# Patient Record
Sex: FEMALE | Race: WHITE | NOT HISPANIC OR LATINO | Employment: UNEMPLOYED | URBAN - METROPOLITAN AREA
[De-identification: names, ages, dates, MRNs, and addresses within clinical notes are randomized per-mention and may not be internally consistent; named-entity substitution may affect disease eponyms.]

---

## 2017-05-17 RX ORDER — LEVOTHYROXINE SODIUM 50 UG/1
TABLET ORAL
Qty: 30 TABLET | Refills: 3 | Status: SHIPPED | OUTPATIENT
Start: 2017-05-17 | End: 2017-07-14 | Stop reason: SDUPTHER

## 2017-06-14 ENCOUNTER — PATIENT MESSAGE (OUTPATIENT)
Dept: INTERNAL MEDICINE | Facility: CLINIC | Age: 57
End: 2017-06-14

## 2017-06-14 DIAGNOSIS — Z00.00 ROUTINE MEDICAL EXAM: Primary | ICD-10-CM

## 2017-06-14 NOTE — TELEPHONE ENCOUNTER
Pt would like her annual lab orders sent to [facility]. Please make sure I pended the orders correctly.

## 2017-06-24 LAB
1,25(OH)2D SERPL-MCNC: 51 PG/ML (ref 18–72)
1,25(OH)2D2 SERPL-MCNC: <8 PG/ML
1,25(OH)2D3 SERPL-MCNC: 51 PG/ML
ALBUMIN SERPL-MCNC: 4.2 G/DL (ref 3.6–5.1)
ALBUMIN/GLOB SERPL: 1.8 (CALC) (ref 1–2.5)
ALP SERPL-CCNC: 69 U/L (ref 33–130)
ALT SERPL-CCNC: 12 U/L (ref 6–29)
AST SERPL-CCNC: 17 U/L (ref 10–35)
BASOPHILS # BLD AUTO: 42 CELLS/UL (ref 0–200)
BASOPHILS NFR BLD AUTO: 0.7 %
BILIRUB SERPL-MCNC: 0.5 MG/DL (ref 0.2–1.2)
BUN SERPL-MCNC: 14 MG/DL (ref 7–25)
BUN/CREAT SERPL: NORMAL (CALC) (ref 6–22)
CALCIUM SERPL-MCNC: 9.1 MG/DL (ref 8.6–10.4)
CHLORIDE SERPL-SCNC: 104 MMOL/L (ref 98–110)
CHOLEST SERPL-MCNC: 231 MG/DL (ref 125–200)
CHOLEST/HDLC SERPL: 2.8 (CALC)
CO2 SERPL-SCNC: 30 MMOL/L (ref 20–31)
CREAT SERPL-MCNC: 0.74 MG/DL (ref 0.5–1.05)
EOSINOPHIL # BLD AUTO: 150 CELLS/UL (ref 15–500)
EOSINOPHIL NFR BLD AUTO: 2.5 %
ERYTHROCYTE [DISTWIDTH] IN BLOOD BY AUTOMATED COUNT: 13.1 % (ref 11–15)
GFR SERPL CREATININE-BSD FRML MDRD: 91 ML/MIN/1.73M2
GLOBULIN SER CALC-MCNC: 2.3 G/DL (CALC) (ref 1.9–3.7)
GLUCOSE SERPL-MCNC: 76 MG/DL (ref 65–99)
HBA1C MFR BLD: 5 % OF TOTAL HGB
HCT VFR BLD AUTO: 38.8 % (ref 35–45)
HDLC SERPL-MCNC: 84 MG/DL
HGB BLD-MCNC: 13 G/DL (ref 11.7–15.5)
LDLC SERPL CALC-MCNC: 116 MG/DL (CALC)
LYMPHOCYTES # BLD AUTO: 2634 CELLS/UL (ref 850–3900)
LYMPHOCYTES NFR BLD AUTO: 43.9 %
MCH RBC QN AUTO: 32.2 PG (ref 27–33)
MCHC RBC AUTO-ENTMCNC: 33.5 G/DL (ref 32–36)
MCV RBC AUTO: 96.1 FL (ref 80–100)
MONOCYTES # BLD AUTO: 552 CELLS/UL (ref 200–950)
MONOCYTES NFR BLD AUTO: 9.2 %
NEUTROPHILS # BLD AUTO: 2622 CELLS/UL (ref 1500–7800)
NEUTROPHILS NFR BLD AUTO: 43.7 %
NONHDLC SERPL-MCNC: 147 MG/DL (CALC)
PLATELET # BLD AUTO: 207 THOUSAND/UL (ref 140–400)
PMV BLD REES-ECKER: 9.9 FL (ref 7.5–12.5)
POTASSIUM SERPL-SCNC: 3.9 MMOL/L (ref 3.5–5.3)
PROT SERPL-MCNC: 6.5 G/DL (ref 6.1–8.1)
RBC # BLD AUTO: 4.04 MILLION/UL (ref 3.8–5.1)
SODIUM SERPL-SCNC: 140 MMOL/L (ref 135–146)
TRIGL SERPL-MCNC: 153 MG/DL
TSH SERPL-ACNC: 2.93 MIU/L (ref 0.4–4.5)
VIT B12 SERPL-MCNC: 506 PG/ML (ref 200–1100)
WBC # BLD AUTO: 6 THOUSAND/UL (ref 3.8–10.8)

## 2017-06-26 ENCOUNTER — PATIENT MESSAGE (OUTPATIENT)
Dept: INTERNAL MEDICINE | Facility: CLINIC | Age: 57
End: 2017-06-26

## 2017-07-14 ENCOUNTER — OFFICE VISIT (OUTPATIENT)
Dept: INTERNAL MEDICINE | Facility: CLINIC | Age: 57
End: 2017-07-14
Payer: COMMERCIAL

## 2017-07-14 VITALS
BODY MASS INDEX: 19.6 KG/M2 | WEIGHT: 106.5 LBS | HEART RATE: 75 BPM | HEIGHT: 62 IN | SYSTOLIC BLOOD PRESSURE: 108 MMHG | DIASTOLIC BLOOD PRESSURE: 68 MMHG

## 2017-07-14 DIAGNOSIS — E03.9 HYPOTHYROIDISM, UNSPECIFIED TYPE: ICD-10-CM

## 2017-07-14 DIAGNOSIS — Z00.00 ROUTINE PHYSICAL EXAMINATION: Primary | ICD-10-CM

## 2017-07-14 PROCEDURE — 99999 PR PBB SHADOW E&M-EST. PATIENT-LVL III: CPT | Mod: PBBFAC,,, | Performed by: INTERNAL MEDICINE

## 2017-07-14 PROCEDURE — 99396 PREV VISIT EST AGE 40-64: CPT | Mod: S$GLB,,, | Performed by: INTERNAL MEDICINE

## 2017-07-14 RX ORDER — LEVOTHYROXINE SODIUM 75 UG/1
75 TABLET ORAL DAILY
Qty: 30 TABLET | Refills: 12 | Status: SHIPPED | OUTPATIENT
Start: 2017-07-14 | End: 2017-08-15 | Stop reason: SDUPTHER

## 2017-07-14 NOTE — PROGRESS NOTES
Subjective:       Patient ID: Kirti Romero is a 56 y.o. female.    Chief Complaint: Annual Exam    History of present illness: Patient comes in for annual exam.  He does complain of fatigue and lack.  She does work in her yard with her  who is newly retired.  Some of her issues seem to stem back to a year ago when her daughter got , moved to [place] and started a new family.  She says that was her last child at home she believes that has been a big change.  She did ask however if we could try raising the dose of her thyroid medicine as that previously helped quite a bit with her energy.  She denies any chest pain shortness of breath fevers or chills.  No cough or wheeze.  Her ASCVD 10 year risk score was 1.2%.  Other than her cholesterol being up from prior years her HDL remains high and the labs were stable.  No a.m. headaches or snoring       Review of Systems   Constitutional: Positive for fatigue (very mild). Negative for chills, fever and unexpected weight change.   HENT: Negative for congestion, facial swelling, sore throat, trouble swallowing and voice change.    Eyes: Negative for pain and visual disturbance.   Respiratory: Negative for apnea, cough, chest tightness and shortness of breath.    Cardiovascular: Negative for chest pain and leg swelling.   Gastrointestinal: Negative for abdominal pain, constipation, diarrhea, nausea and vomiting.   Genitourinary: Negative for frequency, hematuria and menstrual problem.   Musculoskeletal: Negative for arthralgias, joint swelling, neck pain and neck stiffness.   Skin: Negative for rash and wound.   Neurological: Negative for dizziness, tremors, weakness, numbness and headaches.   Hematological: Negative for adenopathy.   Psychiatric/Behavioral: Negative for dysphoric mood. The patient is not nervous/anxious.         Patient complains of lack of motivation or possibly stemming from change in family situation       Objective:      Physical Exam    Constitutional: She is oriented to person, place, and time. She appears well-developed and well-nourished.   Thin healthy female no acute distress   HENT:   Head: Normocephalic and atraumatic.   Right Ear: Tympanic membrane, external ear and ear canal normal.   Left Ear: Tympanic membrane, external ear and ear canal normal.   Nose: Nose normal.   Mouth/Throat: Oropharynx is clear and moist and mucous membranes are normal. No oropharyngeal exudate.   Eyes: Conjunctivae and EOM are normal. Pupils are equal, round, and reactive to light. Right eye exhibits no discharge. Left eye exhibits no discharge.   Neck: Normal range of motion. Neck supple. No JVD present. No thyromegaly present.   Cardiovascular: Normal rate, regular rhythm, normal heart sounds and intact distal pulses.    No murmur heard.  Pulmonary/Chest: Effort normal and breath sounds normal. No respiratory distress. She has no wheezes. She has no rales.   Abdominal: Soft. Bowel sounds are normal. She exhibits no mass. There is no tenderness.   Musculoskeletal: Normal range of motion. She exhibits no edema or tenderness.   Lymphadenopathy:     She has no cervical adenopathy.        Right: No supraclavicular adenopathy present.        Left: No supraclavicular adenopathy present.   Neurological: She is alert and oriented to person, place, and time. She has normal reflexes. No cranial nerve deficit.   Skin: No rash noted.   Psychiatric: She has a normal mood and affect. She does not exhibit a depressed mood.       Assessment:       1. Routine physical examination    2. Hypothyroidism, unspecified type        Plan:       Kirti Nagel was seen today for annual exam.    Diagnoses and all orders for this visit:    Routine physical examination    Hypothyroidism, unspecified type  -     TSH; Future  -     TSH    Other orders  -     levothyroxine (SYNTHROID) 75 MCG tablet; Take 1 tablet (75 mcg total) by mouth once daily.        review response to thyroid medicine.   Her antidepressant medicine although he doesn't admit to being depressed.  She was quite happy and cheerful in the exam, we'll continue to monitor

## 2017-08-11 ENCOUNTER — PATIENT MESSAGE (OUTPATIENT)
Dept: INTERNAL MEDICINE | Facility: CLINIC | Age: 57
End: 2017-08-11

## 2017-08-15 RX ORDER — LEVOTHYROXINE SODIUM 75 UG/1
75 TABLET ORAL DAILY
Qty: 90 TABLET | Refills: 3 | Status: SHIPPED | OUTPATIENT
Start: 2017-08-15 | End: 2018-08-13 | Stop reason: SDUPTHER

## 2017-08-17 ENCOUNTER — PATIENT MESSAGE (OUTPATIENT)
Dept: INTERNAL MEDICINE | Facility: CLINIC | Age: 57
End: 2017-08-17

## 2017-08-17 LAB — TSH SERPL-ACNC: 1.21 MIU/L (ref 0.4–4.5)

## 2017-08-22 ENCOUNTER — OFFICE VISIT (OUTPATIENT)
Dept: OBSTETRICS AND GYNECOLOGY | Facility: CLINIC | Age: 57
End: 2017-08-22
Payer: COMMERCIAL

## 2017-08-22 VITALS
WEIGHT: 105.63 LBS | HEIGHT: 62 IN | SYSTOLIC BLOOD PRESSURE: 108 MMHG | BODY MASS INDEX: 19.44 KG/M2 | DIASTOLIC BLOOD PRESSURE: 60 MMHG

## 2017-08-22 DIAGNOSIS — Z12.4 ROUTINE PAPANICOLAOU SMEAR: Primary | ICD-10-CM

## 2017-08-22 DIAGNOSIS — Z12.31 VISIT FOR SCREENING MAMMOGRAM: ICD-10-CM

## 2017-08-22 DIAGNOSIS — Z01.419 WELL WOMAN EXAM WITH ROUTINE GYNECOLOGICAL EXAM: ICD-10-CM

## 2017-08-22 PROCEDURE — 99396 PREV VISIT EST AGE 40-64: CPT | Mod: S$GLB,,, | Performed by: OBSTETRICS & GYNECOLOGY

## 2017-08-22 PROCEDURE — 88175 CYTOPATH C/V AUTO FLUID REDO: CPT

## 2017-08-22 PROCEDURE — 99999 PR PBB SHADOW E&M-EST. PATIENT-LVL II: CPT | Mod: PBBFAC,,, | Performed by: OBSTETRICS & GYNECOLOGY

## 2017-08-22 RX ORDER — ESTRADIOL 1 MG/1
1 TABLET ORAL DAILY
Qty: 90 TABLET | Refills: 4 | Status: SHIPPED | OUTPATIENT
Start: 2017-08-22 | End: 2018-08-23 | Stop reason: SDUPTHER

## 2017-08-22 NOTE — PROGRESS NOTES
"  HPI:  56 y.o.   OB History      Para Term  AB Living    2 2       2    SAB TAB Ectopic Multiple Live Births            2       No LMP recorded. Patient has had a hysterectomy.   Patient here for her annual gynecologic exam.  She has no complaints at this time.    ROS:  GENERAL: No fever, chills, fatigability or weight loss.  SKIN: No rashes, itching or changes in color or texture of skin.  HEAD: No headaches or recent head trauma.  EYES: Visual acuity fine. No photophobia, ocular pain or diplopia.  EARS: Denies ear pain, discharge or vertigo.  NOSE: No loss of smell, no epistaxis or postnasal drip.  MOUTH & THROAT: No hoarseness or change in voice. No excessive gum bleeding.  NODES: Denies swollen glands.  CHEST: Denies YOUNG, cyanosis, wheezing, cough and sputum production.  CARDIOVASCULAR: Denies chest pain, PND, orthopnea or reduced exercise tolerance.  ABDOMEN: Appetite fine. No weight loss. Denies diarrhea, abdominal pain, hematemesis or blood in stool.  URINARY: No flank pain, dysuria or hematuria.  PERIPHERAL VASCULAR: No claudication or cyanosis.  MUSCULOSKELETAL: No joint stiffness or swelling. Denies back pain.  NEUROLOGIC: No history of seizures, paralysis, alteration of gait or coordination.    PE:   /60   Ht 5' 2" (1.575 m)   Wt 47.9 kg (105 lb 9.6 oz)   BMI 19.31 kg/m²   APPEARANCE: Well nourished, well developed, in no acute distress.  NECK: Neck symmetric without masses or thyromegaly.  NODES: No inguinal lymph node enlargement.  ABDOMEN: Soft. No tenderness or masses. No hepatosplenomegaly. No hernias.  BREASTS: Symmetrical, no skin changes or visible lesions. No palpable masses, nipple discharge or adenopathy bilaterally.  PELVIC: Normal external female genitalia without lesions. Normal hair distribution. Adequate perineal body, normal urethral meatus. Vagina moist and well rugated without lesions or discharge. No significant cystocele or rectocele. Uterus and cervix " surgically absent. Bimanual exam revealed no masses, tenderness or abnormality.    Procedure:  Pap Smear    Assessment:  Normal Gynecologic Exam    Plan:  Mammogram and Colonoscopy as per current recommendations.   Return to clinic in one year or for any problems or complaints.  Ovaries out  estraced 2mg  decreast 1  Doesn't want bone density

## 2017-09-19 ENCOUNTER — HOSPITAL ENCOUNTER (OUTPATIENT)
Dept: RADIOLOGY | Facility: HOSPITAL | Age: 57
Discharge: HOME | End: 2017-09-19
Attending: OBSTETRICS & GYNECOLOGY
Payer: COMMERCIAL

## 2017-09-19 DIAGNOSIS — Z12.31 VISIT FOR SCREENING MAMMOGRAM: ICD-10-CM

## 2017-09-19 PROCEDURE — 77063 BREAST TOMOSYNTHESIS BI: CPT | Mod: 26,,, | Performed by: RADIOLOGY

## 2017-09-19 PROCEDURE — 77067 SCR MAMMO BI INCL CAD: CPT | Mod: 26,,, | Performed by: RADIOLOGY

## 2017-09-19 PROCEDURE — 77067 SCR MAMMO BI INCL CAD: CPT | Mod: TC

## 2018-06-22 ENCOUNTER — TELEPHONE (OUTPATIENT)
Dept: INTERNAL MEDICINE | Facility: CLINIC | Age: 58
End: 2018-06-22

## 2018-07-29 ENCOUNTER — PATIENT MESSAGE (OUTPATIENT)
Dept: INTERNAL MEDICINE | Facility: CLINIC | Age: 58
End: 2018-07-29

## 2018-07-29 DIAGNOSIS — Z00.00 ROUTINE MEDICAL EXAM: Primary | ICD-10-CM

## 2018-08-04 LAB
1,25(OH)2D SERPL-MCNC: 38 PG/ML (ref 18–72)
1,25(OH)2D2 SERPL-MCNC: <8 PG/ML
1,25(OH)2D3 SERPL-MCNC: 38 PG/ML
ALBUMIN SERPL-MCNC: 4.4 G/DL (ref 3.6–5.1)
ALBUMIN/GLOB SERPL: 2.1 (CALC) (ref 1–2.5)
ALP SERPL-CCNC: 70 U/L (ref 33–130)
ALT SERPL-CCNC: 14 U/L (ref 6–29)
AST SERPL-CCNC: 18 U/L (ref 10–35)
BASOPHILS # BLD AUTO: 59 CELLS/UL (ref 0–200)
BASOPHILS NFR BLD AUTO: 1.1 %
BILIRUB SERPL-MCNC: 0.5 MG/DL (ref 0.2–1.2)
BUN SERPL-MCNC: 18 MG/DL (ref 7–25)
BUN/CREAT SERPL: NORMAL (CALC) (ref 6–22)
CALCIUM SERPL-MCNC: 9.4 MG/DL (ref 8.6–10.4)
CHLORIDE SERPL-SCNC: 104 MMOL/L (ref 98–110)
CHOLEST SERPL-MCNC: 216 MG/DL
CHOLEST/HDLC SERPL: 2.5 (CALC)
CO2 SERPL-SCNC: 29 MMOL/L (ref 20–31)
CREAT SERPL-MCNC: 0.8 MG/DL (ref 0.5–1.05)
EOSINOPHIL # BLD AUTO: 140 CELLS/UL (ref 15–500)
EOSINOPHIL NFR BLD AUTO: 2.6 %
ERYTHROCYTE [DISTWIDTH] IN BLOOD BY AUTOMATED COUNT: 12.4 % (ref 11–15)
GFR SERPL CREATININE-BSD FRML MDRD: 82 ML/MIN/1.73M2
GLOBULIN SER CALC-MCNC: 2.1 G/DL (CALC) (ref 1.9–3.7)
GLUCOSE SERPL-MCNC: 79 MG/DL (ref 65–99)
HCT VFR BLD AUTO: 39.4 % (ref 35–45)
HDLC SERPL-MCNC: 88 MG/DL
HGB BLD-MCNC: 13.3 G/DL (ref 11.7–15.5)
LDLC SERPL CALC-MCNC: 113 MG/DL (CALC)
LYMPHOCYTES # BLD AUTO: 2381 CELLS/UL (ref 850–3900)
LYMPHOCYTES NFR BLD AUTO: 44.1 %
MCH RBC QN AUTO: 32 PG (ref 27–33)
MCHC RBC AUTO-ENTMCNC: 33.8 G/DL (ref 32–36)
MCV RBC AUTO: 94.9 FL (ref 80–100)
MONOCYTES # BLD AUTO: 486 CELLS/UL (ref 200–950)
MONOCYTES NFR BLD AUTO: 9 %
NEUTROPHILS # BLD AUTO: 2333 CELLS/UL (ref 1500–7800)
NEUTROPHILS NFR BLD AUTO: 43.2 %
NONHDLC SERPL-MCNC: 128 MG/DL (CALC)
PLATELET # BLD AUTO: 222 THOUSAND/UL (ref 140–400)
PMV BLD REES-ECKER: 11.7 FL (ref 7.5–12.5)
POTASSIUM SERPL-SCNC: 4.1 MMOL/L (ref 3.5–5.3)
PROT SERPL-MCNC: 6.5 G/DL (ref 6.1–8.1)
RBC # BLD AUTO: 4.15 MILLION/UL (ref 3.8–5.1)
SODIUM SERPL-SCNC: 141 MMOL/L (ref 135–146)
TRIGL SERPL-MCNC: 68 MG/DL
TSH SERPL-ACNC: 2.53 MIU/L (ref 0.4–4.5)
VIT B12 SERPL-MCNC: 380 PG/ML (ref 200–1100)
WBC # BLD AUTO: 5.4 THOUSAND/UL (ref 3.8–10.8)

## 2018-08-13 ENCOUNTER — OFFICE VISIT (OUTPATIENT)
Dept: INTERNAL MEDICINE | Facility: CLINIC | Age: 58
End: 2018-08-13
Payer: COMMERCIAL

## 2018-08-13 VITALS
OXYGEN SATURATION: 99 % | WEIGHT: 105.19 LBS | BODY MASS INDEX: 19.36 KG/M2 | DIASTOLIC BLOOD PRESSURE: 64 MMHG | SYSTOLIC BLOOD PRESSURE: 108 MMHG | HEIGHT: 62 IN | HEART RATE: 75 BPM

## 2018-08-13 DIAGNOSIS — E53.8 B12 DEFICIENCY: ICD-10-CM

## 2018-08-13 DIAGNOSIS — E03.9 HYPOTHYROIDISM, UNSPECIFIED TYPE: ICD-10-CM

## 2018-08-13 DIAGNOSIS — R21 SKIN RASH: ICD-10-CM

## 2018-08-13 DIAGNOSIS — E55.9 VITAMIN D DEFICIENCY: ICD-10-CM

## 2018-08-13 DIAGNOSIS — Z00.00 ROUTINE PHYSICAL EXAMINATION: Primary | ICD-10-CM

## 2018-08-13 PROCEDURE — 99999 PR PBB SHADOW E&M-EST. PATIENT-LVL III: CPT | Mod: PBBFAC,,, | Performed by: INTERNAL MEDICINE

## 2018-08-13 PROCEDURE — 99396 PREV VISIT EST AGE 40-64: CPT | Mod: S$GLB,,, | Performed by: INTERNAL MEDICINE

## 2018-08-13 RX ORDER — LEVOTHYROXINE SODIUM 75 UG/1
75 TABLET ORAL DAILY
Qty: 90 TABLET | Refills: 3 | Status: SHIPPED | OUTPATIENT
Start: 2018-08-13 | End: 2019-08-15 | Stop reason: SDUPTHER

## 2018-08-13 RX ORDER — TRETINOIN 0.25 MG/G
CREAM TOPICAL NIGHTLY
Qty: 45 G | Refills: 6 | Status: SHIPPED | OUTPATIENT
Start: 2018-08-13 | End: 2020-08-18

## 2018-08-13 NOTE — PROGRESS NOTES
Subjective:       Patient ID: Kirti Romero is a 57 y.o. female.    Chief Complaint: Annual Exam    HPI:  Patient comes in for annual exam.  She is doing well.  She asked me to write a prescription given to her by her dermatologist for some facial blemishes.  She also needs her thyroid refilled.  She is doing very well, has no active or acute complaints.  Prescriptions were reviewed and all are stable.      Review of Systems   Constitutional: Negative for activity change, chills, fatigue, fever and unexpected weight change.   HENT: Negative for hearing loss, nosebleeds, rhinorrhea, sinus pain, sore throat and trouble swallowing.    Eyes: Negative for pain, discharge and visual disturbance.   Respiratory: Negative for apnea, cough, chest tightness, shortness of breath and wheezing.    Cardiovascular: Negative for chest pain, palpitations and leg swelling.   Gastrointestinal: Negative for abdominal pain, blood in stool, constipation, diarrhea, nausea and vomiting.   Endocrine: Negative for polydipsia and polyuria.   Genitourinary: Negative for difficulty urinating, dysuria, frequency, hematuria and menstrual problem.   Musculoskeletal: Negative for arthralgias, joint swelling, neck pain and neck stiffness.   Skin: Negative for rash and wound.   Neurological: Negative for dizziness, weakness and headaches.   Hematological: Negative for adenopathy.   Psychiatric/Behavioral: Negative for confusion and dysphoric mood. The patient is not nervous/anxious.        Objective:      Physical Exam   Constitutional: She is oriented to person, place, and time. She appears well-developed and well-nourished.   HENT:   Head: Normocephalic and atraumatic.   Right Ear: Tympanic membrane, external ear and ear canal normal.   Left Ear: Tympanic membrane, external ear and ear canal normal.   Nose: Nose normal.   Mouth/Throat: Oropharynx is clear and moist and mucous membranes are normal. No oropharyngeal exudate.   Eyes: Conjunctivae  and EOM are normal. Pupils are equal, round, and reactive to light. Right eye exhibits no discharge. Left eye exhibits no discharge.   Neck: Normal range of motion. Neck supple. No JVD present. No thyromegaly present.   Cardiovascular: Normal rate, regular rhythm, normal heart sounds and intact distal pulses.   No murmur heard.  Pulmonary/Chest: Effort normal and breath sounds normal. No respiratory distress. She has no wheezes. She has no rales.   Abdominal: Soft. Bowel sounds are normal. She exhibits no mass. There is no tenderness.   Musculoskeletal: Normal range of motion. She exhibits no edema or tenderness.   Lymphadenopathy:     She has no cervical adenopathy.        Right: No supraclavicular adenopathy present.        Left: No supraclavicular adenopathy present.   Neurological: She is alert and oriented to person, place, and time. She has normal reflexes. No cranial nerve deficit.   Skin: No rash noted.   Psychiatric: She has a normal mood and affect. She does not exhibit a depressed mood.       Assessment:       1. Routine physical examination    2. Hypothyroidism, unspecified type    3. Skin rash    4. Vitamin D deficiency    5. B12 deficiency        Plan:       Kirti Nagel was seen today for annual exam.    Diagnoses and all orders for this visit:    Routine physical examination  -     Vitamin D; Future  -     Vitamin B12; Future  -     TSH; Future  -     CBC auto differential; Future  -     Comprehensive metabolic panel; Future  -     Hemoglobin A1c; Future  -     Lipid panel; Future  -     Vitamin D  -     Vitamin B12  -     TSH  -     CBC auto differential  -     Comprehensive metabolic panel  -     Hemoglobin A1c  -     Lipid panel    Hypothyroidism, unspecified type  -     Vitamin D; Future  -     Vitamin B12; Future  -     TSH; Future  -     CBC auto differential; Future  -     Comprehensive metabolic panel; Future  -     Hemoglobin A1c; Future  -     Lipid panel; Future  -     Vitamin D  -      Vitamin B12  -     TSH  -     CBC auto differential  -     Comprehensive metabolic panel  -     Hemoglobin A1c  -     Lipid panel    Skin rash    Vitamin D deficiency  -     Vitamin D; Future  -     Vitamin B12; Future  -     TSH; Future  -     CBC auto differential; Future  -     Comprehensive metabolic panel; Future  -     Hemoglobin A1c; Future  -     Lipid panel; Future  -     Vitamin D  -     Vitamin B12  -     TSH  -     CBC auto differential  -     Comprehensive metabolic panel  -     Hemoglobin A1c  -     Lipid panel    B12 deficiency  -     Vitamin D; Future  -     Vitamin B12; Future  -     TSH; Future  -     CBC auto differential; Future  -     Comprehensive metabolic panel; Future  -     Hemoglobin A1c; Future  -     Lipid panel; Future  -     Vitamin D  -     Vitamin B12  -     TSH  -     CBC auto differential  -     Comprehensive metabolic panel  -     Hemoglobin A1c  -     Lipid panel    Other orders  -     levothyroxine (SYNTHROID) 75 MCG tablet; Take 1 tablet (75 mcg total) by mouth once daily.  -     tretinoin (RETIN-A) 0.025 % cream; Apply topically every evening.

## 2018-08-23 ENCOUNTER — OFFICE VISIT (OUTPATIENT)
Dept: OBSTETRICS AND GYNECOLOGY | Facility: CLINIC | Age: 58
End: 2018-08-23
Payer: COMMERCIAL

## 2018-08-23 VITALS
DIASTOLIC BLOOD PRESSURE: 68 MMHG | BODY MASS INDEX: 19.6 KG/M2 | HEIGHT: 62 IN | WEIGHT: 106.5 LBS | SYSTOLIC BLOOD PRESSURE: 102 MMHG

## 2018-08-23 DIAGNOSIS — Z12.4 ROUTINE PAPANICOLAOU SMEAR: Primary | ICD-10-CM

## 2018-08-23 DIAGNOSIS — Z01.419 WELL WOMAN EXAM WITH ROUTINE GYNECOLOGICAL EXAM: ICD-10-CM

## 2018-08-23 PROCEDURE — 99999 PR PBB SHADOW E&M-EST. PATIENT-LVL III: CPT | Mod: PBBFAC,,, | Performed by: OBSTETRICS & GYNECOLOGY

## 2018-08-23 PROCEDURE — 88175 CYTOPATH C/V AUTO FLUID REDO: CPT

## 2018-08-23 PROCEDURE — 99396 PREV VISIT EST AGE 40-64: CPT | Mod: S$GLB,,, | Performed by: OBSTETRICS & GYNECOLOGY

## 2018-08-23 RX ORDER — ESTRADIOL 1 MG/1
1 TABLET ORAL DAILY
Qty: 90 TABLET | Refills: 4 | Status: SHIPPED | OUTPATIENT
Start: 2018-08-23 | End: 2019-08-23

## 2018-08-23 NOTE — PROGRESS NOTES
"  HPI:  57 y.o.   OB History      Para Term  AB Living    4 4 2     2    SAB TAB Ectopic Multiple Live Births            2       Patient's last menstrual period was 1998.   Patient here for her annual gynecologic exam.  She has no complaints at this time.    ROS:  GENERAL: No fever, chills, fatigability or weight loss.  SKIN: No rashes, itching or changes in color or texture of skin.  HEAD: No headaches or recent head trauma.  EYES: Visual acuity fine. No photophobia, ocular pain or diplopia.  EARS: Denies ear pain, discharge or vertigo.  NOSE: No loss of smell, no epistaxis or postnasal drip.  MOUTH & THROAT: No hoarseness or change in voice. No excessive gum bleeding.  NODES: Denies swollen glands.  CHEST: Denies YOUNG, cyanosis, wheezing, cough and sputum production.  CARDIOVASCULAR: Denies chest pain, PND, orthopnea or reduced exercise tolerance.  ABDOMEN: Appetite fine. No weight loss. Denies diarrhea, abdominal pain, hematemesis or blood in stool.  URINARY: No flank pain, dysuria or hematuria.  PERIPHERAL VASCULAR: No claudication or cyanosis.  MUSCULOSKELETAL: No joint stiffness or swelling. Denies back pain.  NEUROLOGIC: No history of seizures, paralysis, alteration of gait or coordination.    PE:   /68   Ht 5' 2" (1.575 m)   Wt 48.3 kg (106 lb 7.7 oz)   LMP 1998   BMI 19.48 kg/m²   APPEARANCE: Well nourished, well developed, in no acute distress.  NECK: Neck symmetric without masses or thyromegaly.  NODES: No inguinal lymph node enlargement.  ABDOMEN: Soft. No tenderness or masses. No hepatosplenomegaly. No hernias.  BREASTS: Symmetrical, no skin changes or visible lesions. No palpable masses, nipple discharge or adenopathy bilaterally.  PELVIC: Normal external female genitalia without lesions. Normal hair distribution. Adequate perineal body, normal urethral meatus. Vagina moist and well rugated without lesions or discharge. No significant cystocele or rectocele. Uterus " and cervix surgically absent. Bimanual exam revealed no masses, tenderness or abnormality.    Procedure:  Pap Smear    Assessment:  Normal Gynecologic Exam    Plan:  Mammogram and Colonoscopy as per current recommendations.   Return to clinic in one year or for any problems or complaints.  No co  Normal gyn exam

## 2018-09-20 ENCOUNTER — HOSPITAL ENCOUNTER (OUTPATIENT)
Dept: RADIOLOGY | Facility: HOSPITAL | Age: 58
Discharge: HOME | End: 2018-09-20
Attending: OBSTETRICS & GYNECOLOGY
Payer: COMMERCIAL

## 2018-09-20 DIAGNOSIS — Z12.4 ROUTINE PAPANICOLAOU SMEAR: ICD-10-CM

## 2018-09-20 PROCEDURE — 77063 BREAST TOMOSYNTHESIS BI: CPT | Mod: TC

## 2018-09-20 PROCEDURE — 77063 BREAST TOMOSYNTHESIS BI: CPT | Mod: 26,,, | Performed by: RADIOLOGY

## 2018-09-20 PROCEDURE — 77067 SCR MAMMO BI INCL CAD: CPT | Mod: 26,,, | Performed by: RADIOLOGY

## 2018-10-06 ENCOUNTER — OFFICE VISIT (OUTPATIENT)
Dept: URGENT CARE | Facility: CLINIC | Age: 58
End: 2018-10-06
Payer: COMMERCIAL

## 2018-10-06 VITALS
HEIGHT: 62 IN | BODY MASS INDEX: 19.32 KG/M2 | DIASTOLIC BLOOD PRESSURE: 69 MMHG | HEART RATE: 70 BPM | RESPIRATION RATE: 16 BRPM | OXYGEN SATURATION: 99 % | SYSTOLIC BLOOD PRESSURE: 116 MMHG | WEIGHT: 105 LBS | TEMPERATURE: 98 F

## 2018-10-06 DIAGNOSIS — J06.9 UPPER RESPIRATORY TRACT INFECTION, UNSPECIFIED TYPE: Primary | ICD-10-CM

## 2018-10-06 DIAGNOSIS — R09.82 POST-NASAL DRIP: ICD-10-CM

## 2018-10-06 PROCEDURE — 3008F BODY MASS INDEX DOCD: CPT | Mod: CPTII,S$GLB,, | Performed by: NURSE PRACTITIONER

## 2018-10-06 PROCEDURE — 96372 THER/PROPH/DIAG INJ SC/IM: CPT | Mod: S$GLB,,, | Performed by: NURSE PRACTITIONER

## 2018-10-06 PROCEDURE — 99214 OFFICE O/P EST MOD 30 MIN: CPT | Mod: 25,S$GLB,, | Performed by: NURSE PRACTITIONER

## 2018-10-06 RX ORDER — BENZONATATE 100 MG/1
100 CAPSULE ORAL EVERY 6 HOURS PRN
Qty: 30 CAPSULE | Refills: 1 | Status: SHIPPED | OUTPATIENT
Start: 2018-10-06 | End: 2019-10-06

## 2018-10-06 RX ORDER — BETAMETHASONE SODIUM PHOSPHATE AND BETAMETHASONE ACETATE 3; 3 MG/ML; MG/ML
6 INJECTION, SUSPENSION INTRA-ARTICULAR; INTRALESIONAL; INTRAMUSCULAR; SOFT TISSUE
Status: COMPLETED | OUTPATIENT
Start: 2018-10-06 | End: 2018-10-06

## 2018-10-06 RX ORDER — PROMETHAZINE HYDROCHLORIDE AND DEXTROMETHORPHAN HYDROBROMIDE 6.25; 15 MG/5ML; MG/5ML
5 SYRUP ORAL NIGHTLY PRN
Qty: 118 ML | Refills: 0 | Status: SHIPPED | OUTPATIENT
Start: 2018-10-06 | End: 2018-10-11

## 2018-10-06 RX ORDER — LEVOCETIRIZINE DIHYDROCHLORIDE 5 MG/1
5 TABLET, FILM COATED ORAL NIGHTLY
Qty: 30 TABLET | Refills: 0 | Status: SHIPPED | OUTPATIENT
Start: 2018-10-06 | End: 2023-01-31

## 2018-10-06 RX ORDER — PREDNISONE 20 MG/1
20 TABLET ORAL DAILY
Qty: 4 TABLET | Refills: 0 | Status: SHIPPED | OUTPATIENT
Start: 2018-10-07 | End: 2018-10-11

## 2018-10-06 RX ADMIN — BETAMETHASONE SODIUM PHOSPHATE AND BETAMETHASONE ACETATE 6 MG: 3; 3 INJECTION, SUSPENSION INTRA-ARTICULAR; INTRALESIONAL; INTRAMUSCULAR; SOFT TISSUE at 01:10

## 2018-10-06 NOTE — PROGRESS NOTES
"Subjective:       Patient ID: Kirti Romero is a 57 y.o. female.    Vitals:  height is 5' 2" (1.575 m) and weight is 47.6 kg (105 lb). Her temperature is 97.9 °F (36.6 °C). Her blood pressure is 116/69 and her pulse is 70. Her respiration is 16 and oxygen saturation is 99%.     Chief Complaint: Nasal Congestion    Cough   This is a new problem. The current episode started in the past 7 days. The problem has been gradually worsening. The problem occurs constantly. The cough is productive of sputum. Associated symptoms include ear pain and headaches. Pertinent negatives include no chest pain, chills, eye redness, fever, myalgias, sore throat, shortness of breath or wheezing. The symptoms are aggravated by lying down. She has tried OTC cough suppressant for the symptoms. The treatment provided no relief.     Review of Systems   Constitution: Negative for chills, fever and malaise/fatigue.   HENT: Positive for congestion and ear pain. Negative for hoarse voice and sore throat.    Eyes: Negative for discharge and redness.   Cardiovascular: Negative for chest pain, dyspnea on exertion and leg swelling.   Respiratory: Positive for cough. Negative for shortness of breath, sputum production and wheezing.    Musculoskeletal: Negative for myalgias.   Gastrointestinal: Negative for abdominal pain and nausea.   Neurological: Positive for headaches.   All other systems reviewed and are negative.      Objective:      Physical Exam   Constitutional: She is oriented to person, place, and time. She appears well-developed and well-nourished. She is cooperative.  Non-toxic appearance. She does not appear ill. No distress.   HENT:   Head: Normocephalic and atraumatic.   Right Ear: Hearing, tympanic membrane, external ear and ear canal normal.   Left Ear: Hearing, tympanic membrane, external ear and ear canal normal.   Nose: Nose normal. No mucosal edema, rhinorrhea or nasal deformity. No epistaxis. Right sinus exhibits no " maxillary sinus tenderness and no frontal sinus tenderness. Left sinus exhibits no maxillary sinus tenderness and no frontal sinus tenderness.   Mouth/Throat: Uvula is midline and mucous membranes are normal. No trismus in the jaw. Normal dentition. No uvula swelling. Posterior oropharyngeal erythema present.   PND, cough   Eyes: EOM and lids are normal. Pupils are equal, round, and reactive to light. Right conjunctiva is injected. Left conjunctiva is injected. No scleral icterus.       Sclera clear bilat   Neck: Trachea normal, full passive range of motion without pain and phonation normal. Neck supple.   Cardiovascular: Normal rate, regular rhythm, normal heart sounds, intact distal pulses and normal pulses.   Pulmonary/Chest: Effort normal and breath sounds normal. No stridor. No respiratory distress. She has no decreased breath sounds. She has no wheezes. She has no rhonchi. She has no rales.   Abdominal: Soft. Normal appearance and bowel sounds are normal. She exhibits no distension. There is no tenderness.   Musculoskeletal: Normal range of motion. She exhibits no edema or deformity.   Neurological: She is alert and oriented to person, place, and time. She exhibits normal muscle tone. Coordination normal.   Skin: Skin is warm, dry and intact. She is not diaphoretic. No pallor.   Psychiatric: She has a normal mood and affect. Her speech is normal and behavior is normal. Judgment and thought content normal. Cognition and memory are normal.   Nursing note and vitals reviewed.      Assessment:       1. Upper respiratory tract infection, unspecified type    2. Post-nasal drip        Plan:         Upper respiratory tract infection, unspecified type    Post-nasal drip    Other orders  -     betamethasone acetate-betamethasone sodium phosphate injection 6 mg; Inject 1 mL (6 mg total) into the muscle one time.  -     predniSONE (DELTASONE) 20 MG tablet; Take 1 tablet (20 mg total) by mouth once daily. for 4 days   Dispense: 4 tablet; Refill: 0  -     levocetirizine (XYZAL) 5 MG tablet; Take 1 tablet (5 mg total) by mouth every evening.  Dispense: 30 tablet; Refill: 0  -     benzonatate (TESSALON PERLES) 100 MG capsule; Take 1 capsule (100 mg total) by mouth every 6 (six) hours as needed for Cough.  Dispense: 30 capsule; Refill: 1  -     promethazine-dextromethorphan (PROMETHAZINE-DM) 6.25-15 mg/5 mL Syrp; Take 5 mLs by mouth nightly as needed.  Dispense: 118 mL; Refill: 0        Viral Upper Respiratory Illness (Adult)  You have a viral upper respiratory illness (URI), which is another term for the common cold. This illness is contagious during the first few days. It is spread through the air by coughing and sneezing. It may also be spread by direct contact (touching the sick person and then touching your own eyes, nose, or mouth). Frequent handwashing will decrease risk of spread. Most viral illnesses go away within 7 to 10 days with rest and simple home remedies. Sometimes the illness may last for several weeks. Antibiotics will not kill a virus, and they are generally not prescribed for this condition.    Home care  · If symptoms are severe, rest at home for the first 2 to 3 days. When you resume activity, don't let yourself get too tired.  · Avoid being exposed to cigarette smoke (yours or others).  · You may use acetaminophen or ibuprofen to control pain and fever, unless another medicine was prescribed. (Note: If you have chronic liver or kidney disease, have ever had a stomach ulcer or gastrointestinal bleeding, or are taking blood-thinning medicines, talk with your healthcare provider before using these medicines.) Aspirin should never be given to anyone under 18 years of age who is ill with a viral infection or fever. It may cause severe liver or brain damage.  · Your appetite may be poor, so a light diet is fine. Avoid dehydration by drinking 6 to 8 glasses of fluids per day (water, soft drinks, juices, tea, or  soup). Extra fluids will help loosen secretions in the nose and lungs.  · Over-the-counter cold medicines will not shorten the length of time youre sick, but they may be helpful for the following symptoms: cough, sore throat, and nasal and sinus congestion. (Note: Do not use decongestants if you have high blood pressure.)  Follow-up care  Follow up with your healthcare provider, or as advised.  When to seek medical advice  Call your healthcare provider right away if any of these occur:  · Cough with lots of colored sputum (mucus)  · Severe headache; face, neck, or ear pain  · Difficulty swallowing due to throat pain  · Fever of 100.4°F (38°C)  Call 911, or get immediate medical care  Call emergency services right away if any of these occur:  · Chest pain, shortness of breath, wheezing, or difficulty breathing  · Coughing up blood  · Inability to swallow due to throat pain  Date Last Reviewed: 9/13/2015  © 3409-1874 [employer]. [address] [place] [address], [place] [id number]. All rights reserved. This information is not intended as a substitute for professional medical care. Always follow your healthcare professional's instructions.      Please return here or go to the Emergency Department for any concerns or worsening of condition.  Please follow up with your primary care doctor or specialist as needed.    If you  smoke, please stop smoking.

## 2018-10-06 NOTE — PATIENT INSTRUCTIONS
Viral Upper Respiratory Illness (Adult)  You have a viral upper respiratory illness (URI), which is another term for the common cold. This illness is contagious during the first few days. It is spread through the air by coughing and sneezing. It may also be spread by direct contact (touching the sick person and then touching your own eyes, nose, or mouth). Frequent handwashing will decrease risk of spread. Most viral illnesses go away within 7 to 10 days with rest and simple home remedies. Sometimes the illness may last for several weeks. Antibiotics will not kill a virus, and they are generally not prescribed for this condition.    Home care  · If symptoms are severe, rest at home for the first 2 to 3 days. When you resume activity, don't let yourself get too tired.  · Avoid being exposed to cigarette smoke (yours or others).  · You may use acetaminophen or ibuprofen to control pain and fever, unless another medicine was prescribed. (Note: If you have chronic liver or kidney disease, have ever had a stomach ulcer or gastrointestinal bleeding, or are taking blood-thinning medicines, talk with your healthcare provider before using these medicines.) Aspirin should never be given to anyone under 18 years of age who is ill with a viral infection or fever. It may cause severe liver or brain damage.  · Your appetite may be poor, so a light diet is fine. Avoid dehydration by drinking 6 to 8 glasses of fluids per day (water, soft drinks, juices, tea, or soup). Extra fluids will help loosen secretions in the nose and lungs.  · Over-the-counter cold medicines will not shorten the length of time youre sick, but they may be helpful for the following symptoms: cough, sore throat, and nasal and sinus congestion. (Note: Do not use decongestants if you have high blood pressure.)  Follow-up care  Follow up with your healthcare provider, or as advised.  When to seek medical advice  Call your healthcare provider right away if any  of these occur:  · Cough with lots of colored sputum (mucus)  · Severe headache; face, neck, or ear pain  · Difficulty swallowing due to throat pain  · Fever of 100.4°F (38°C)  Call 911, or get immediate medical care  Call emergency services right away if any of these occur:  · Chest pain, shortness of breath, wheezing, or difficulty breathing  · Coughing up blood  · Inability to swallow due to throat pain  Date Last Reviewed: 9/13/2015 © 2000-2017 [employer]. [address] [place] [address]. All rights reserved. This information is not intended as a substitute for professional medical care. Always follow your healthcare professional's instructions.      Please return here or go to the Emergency Department for any concerns or worsening of condition.  Please follow up with your primary care doctor or specialist as needed.    If you  smoke, please stop smoking.

## 2018-12-04 ENCOUNTER — PATIENT MESSAGE (OUTPATIENT)
Dept: INTERNAL MEDICINE | Facility: CLINIC | Age: 58
End: 2018-12-04

## 2018-12-04 ENCOUNTER — TELEPHONE (OUTPATIENT)
Dept: INTERNAL MEDICINE | Facility: CLINIC | Age: 58
End: 2018-12-04

## 2019-08-01 ENCOUNTER — PATIENT MESSAGE (OUTPATIENT)
Dept: INTERNAL MEDICINE | Facility: CLINIC | Age: 59
End: 2019-08-01

## 2019-08-01 DIAGNOSIS — Z00.00 ROUTINE PHYSICAL EXAMINATION: ICD-10-CM

## 2019-08-01 DIAGNOSIS — E03.9 HYPOTHYROIDISM, UNSPECIFIED TYPE: Primary | ICD-10-CM

## 2019-08-04 LAB
25(OH)D3 SERPL-MCNC: 45 NG/ML (ref 30–100)
ALBUMIN SERPL-MCNC: 4.4 G/DL (ref 3.6–5.1)
ALBUMIN/GLOB SERPL: 1.9 (CALC) (ref 1–2.5)
ALP SERPL-CCNC: 78 U/L (ref 33–130)
ALT SERPL-CCNC: 13 U/L (ref 6–29)
AST SERPL-CCNC: 18 U/L (ref 10–35)
BASOPHILS # BLD AUTO: 49 CELLS/UL (ref 0–200)
BASOPHILS NFR BLD AUTO: 1 %
BILIRUB SERPL-MCNC: 0.6 MG/DL (ref 0.2–1.2)
BUN SERPL-MCNC: 15 MG/DL (ref 7–25)
BUN/CREAT SERPL: NORMAL (CALC) (ref 6–22)
CALCIUM SERPL-MCNC: 9.5 MG/DL (ref 8.6–10.4)
CHLORIDE SERPL-SCNC: 103 MMOL/L (ref 98–110)
CHOLEST SERPL-MCNC: 207 MG/DL
CHOLEST/HDLC SERPL: 2.6 (CALC)
CO2 SERPL-SCNC: 32 MMOL/L (ref 20–32)
CREAT SERPL-MCNC: 0.67 MG/DL (ref 0.5–1.05)
EOSINOPHIL # BLD AUTO: 118 CELLS/UL (ref 15–500)
EOSINOPHIL NFR BLD AUTO: 2.4 %
ERYTHROCYTE [DISTWIDTH] IN BLOOD BY AUTOMATED COUNT: 12.8 % (ref 11–15)
GFRSERPLBLD MDRD-ARVRAT: 97 ML/MIN/1.73M2
GLOBULIN SER CALC-MCNC: 2.3 G/DL (CALC) (ref 1.9–3.7)
GLUCOSE SERPL-MCNC: 77 MG/DL (ref 65–99)
HCT VFR BLD AUTO: 39.8 % (ref 35–45)
HDLC SERPL-MCNC: 81 MG/DL
HGB BLD-MCNC: 13 G/DL (ref 11.7–15.5)
LDLC SERPL CALC-MCNC: 109 MG/DL (CALC)
LYMPHOCYTES # BLD AUTO: 2347 CELLS/UL (ref 850–3900)
LYMPHOCYTES NFR BLD AUTO: 47.9 %
MCH RBC QN AUTO: 31.3 PG (ref 27–33)
MCHC RBC AUTO-ENTMCNC: 32.7 G/DL (ref 32–36)
MCV RBC AUTO: 95.9 FL (ref 80–100)
MONOCYTES # BLD AUTO: 431 CELLS/UL (ref 200–950)
MONOCYTES NFR BLD AUTO: 8.8 %
NEUTROPHILS # BLD AUTO: 1955 CELLS/UL (ref 1500–7800)
NEUTROPHILS NFR BLD AUTO: 39.9 %
NONHDLC SERPL-MCNC: 126 MG/DL (CALC)
PLATELET # BLD AUTO: 188 THOUSAND/UL (ref 140–400)
PMV BLD REES-ECKER: 12.3 FL (ref 7.5–12.5)
POTASSIUM SERPL-SCNC: 4.2 MMOL/L (ref 3.5–5.3)
PROT SERPL-MCNC: 6.7 G/DL (ref 6.1–8.1)
RBC # BLD AUTO: 4.15 MILLION/UL (ref 3.8–5.1)
SODIUM SERPL-SCNC: 142 MMOL/L (ref 135–146)
TRIGL SERPL-MCNC: 77 MG/DL
TSH SERPL-ACNC: 2.85 MIU/L (ref 0.4–4.5)
VIT B12 SERPL-MCNC: 396 PG/ML (ref 200–1100)
WBC # BLD AUTO: 4.9 THOUSAND/UL (ref 3.8–10.8)

## 2019-08-15 ENCOUNTER — OFFICE VISIT (OUTPATIENT)
Dept: INTERNAL MEDICINE | Facility: CLINIC | Age: 59
End: 2019-08-15
Payer: COMMERCIAL

## 2019-08-15 VITALS
HEART RATE: 71 BPM | SYSTOLIC BLOOD PRESSURE: 102 MMHG | BODY MASS INDEX: 19.23 KG/M2 | WEIGHT: 105.19 LBS | DIASTOLIC BLOOD PRESSURE: 59 MMHG | OXYGEN SATURATION: 99 %

## 2019-08-15 DIAGNOSIS — E03.9 HYPOTHYROIDISM, UNSPECIFIED TYPE: ICD-10-CM

## 2019-08-15 DIAGNOSIS — Z00.00 ROUTINE PHYSICAL EXAMINATION: Primary | ICD-10-CM

## 2019-08-15 PROCEDURE — 99396 PREV VISIT EST AGE 40-64: CPT | Mod: S$GLB,,, | Performed by: INTERNAL MEDICINE

## 2019-08-15 PROCEDURE — 99999 PR PBB SHADOW E&M-EST. PATIENT-LVL III: ICD-10-PCS | Mod: PBBFAC,,, | Performed by: INTERNAL MEDICINE

## 2019-08-15 PROCEDURE — 99396 PR PREVENTIVE VISIT,EST,40-64: ICD-10-PCS | Mod: S$GLB,,, | Performed by: INTERNAL MEDICINE

## 2019-08-15 PROCEDURE — 99999 PR PBB SHADOW E&M-EST. PATIENT-LVL III: CPT | Mod: PBBFAC,,, | Performed by: INTERNAL MEDICINE

## 2019-08-15 RX ORDER — LEVOTHYROXINE SODIUM 75 UG/1
75 TABLET ORAL DAILY
Qty: 90 TABLET | Refills: 3 | Status: SHIPPED | OUTPATIENT
Start: 2019-08-15 | End: 2020-08-18 | Stop reason: SDUPTHER

## 2019-08-15 NOTE — PROGRESS NOTES
Subjective:       Patient ID: Kirti Romero is a 58 y.o. female.    Chief Complaint: Annual Exam    HPI:  Patient is doing well, here for annual exam.  She said she has had a great year.  Good energy.  Good mood.  Visiting her grandkids.   is retired.  She is due for gyn check and mammogram in the coming month or 2.  Labs reviewed and stable.  Continue current meds.    Review of Systems   Constitutional: Negative for activity change, appetite change, chills and fever.   HENT: Negative for nosebleeds, sinus pain and sore throat.    Eyes: Negative for discharge and visual disturbance.   Respiratory: Negative for cough, shortness of breath and wheezing.    Cardiovascular: Negative for chest pain, palpitations and leg swelling.   Gastrointestinal: Negative for abdominal pain, constipation, diarrhea and vomiting.   Genitourinary: Negative for difficulty urinating and hematuria.   Musculoskeletal: Negative for neck pain and neck stiffness.   Skin: Negative for pallor and rash.   Neurological: Negative for headaches.   Psychiatric/Behavioral: Negative for dysphoric mood and suicidal ideas. The patient is not nervous/anxious.        Objective:      Physical Exam   Constitutional: She is oriented to person, place, and time. She appears well-developed and well-nourished. No distress.   HENT:   Head: Normocephalic and atraumatic.   Right Ear: External ear normal.   Left Ear: External ear normal.   Mouth/Throat: Oropharynx is clear and moist. No oropharyngeal exudate.   Eyes: Pupils are equal, round, and reactive to light. Conjunctivae are normal. No scleral icterus.   Neck: Normal range of motion. Neck supple. No thyromegaly present.   Cardiovascular: Normal rate and regular rhythm.   No murmur heard.  Pulmonary/Chest: Effort normal and breath sounds normal. She has no wheezes.   Abdominal: Soft. Bowel sounds are normal. She exhibits no distension. There is no tenderness.   Musculoskeletal: She exhibits no  tenderness.   Lymphadenopathy:     She has no cervical adenopathy.   Neurological: She is alert and oriented to person, place, and time.   Skin: No rash noted.   Psychiatric: She has a normal mood and affect.       Assessment:       1. Routine physical examination    2. Hypothyroidism, unspecified type        Plan:       Kirti Nagel was seen today for annual exam.    Diagnoses and all orders for this visit:    Routine physical examination    Hypothyroidism, unspecified type    Other orders  -     levothyroxine (SYNTHROID) 75 MCG tablet; Take 1 tablet (75 mcg total) by mouth once daily.        follow-up annually

## 2019-09-04 ENCOUNTER — PATIENT MESSAGE (OUTPATIENT)
Dept: OBSTETRICS AND GYNECOLOGY | Facility: CLINIC | Age: 59
End: 2019-09-04

## 2019-09-04 DIAGNOSIS — Z12.39 SCREENING BREAST EXAMINATION: Primary | ICD-10-CM

## 2019-09-24 ENCOUNTER — PATIENT OUTREACH (OUTPATIENT)
Dept: ADMINISTRATIVE | Facility: OTHER | Age: 59
End: 2019-09-24

## 2019-09-26 ENCOUNTER — HOSPITAL ENCOUNTER (OUTPATIENT)
Dept: RADIOLOGY | Facility: HOSPITAL | Age: 59
Discharge: HOME | End: 2019-09-26
Attending: OBSTETRICS & GYNECOLOGY
Payer: COMMERCIAL

## 2019-09-26 ENCOUNTER — OFFICE VISIT (OUTPATIENT)
Dept: OBSTETRICS AND GYNECOLOGY | Facility: CLINIC | Age: 59
End: 2019-09-26
Payer: COMMERCIAL

## 2019-09-26 VITALS
WEIGHT: 106.13 LBS | BODY MASS INDEX: 19.53 KG/M2 | SYSTOLIC BLOOD PRESSURE: 110 MMHG | HEIGHT: 62 IN | DIASTOLIC BLOOD PRESSURE: 68 MMHG

## 2019-09-26 DIAGNOSIS — Z01.419 ENCOUNTER FOR WELL WOMAN EXAM WITH ROUTINE GYNECOLOGICAL EXAM: Primary | ICD-10-CM

## 2019-09-26 DIAGNOSIS — Z12.39 SCREENING BREAST EXAMINATION: ICD-10-CM

## 2019-09-26 DIAGNOSIS — Z01.419 WELL WOMAN EXAM WITH ROUTINE GYNECOLOGICAL EXAM: ICD-10-CM

## 2019-09-26 PROCEDURE — 77067 SCR MAMMO BI INCL CAD: CPT | Mod: 26,,, | Performed by: RADIOLOGY

## 2019-09-26 PROCEDURE — 77063 MAMMO DIGITAL SCREENING BILAT WITH TOMOSYNTHESIS_CAD: ICD-10-PCS | Mod: 26,,, | Performed by: RADIOLOGY

## 2019-09-26 PROCEDURE — 99396 PR PREVENTIVE VISIT,EST,40-64: ICD-10-PCS | Mod: S$GLB,,, | Performed by: OBSTETRICS & GYNECOLOGY

## 2019-09-26 PROCEDURE — 99396 PREV VISIT EST AGE 40-64: CPT | Mod: S$GLB,,, | Performed by: OBSTETRICS & GYNECOLOGY

## 2019-09-26 PROCEDURE — 77067 SCR MAMMO BI INCL CAD: CPT | Mod: TC

## 2019-09-26 PROCEDURE — 77067 MAMMO DIGITAL SCREENING BILAT WITH TOMOSYNTHESIS_CAD: ICD-10-PCS | Mod: 26,,, | Performed by: RADIOLOGY

## 2019-09-26 PROCEDURE — 99999 PR PBB SHADOW E&M-EST. PATIENT-LVL III: CPT | Mod: PBBFAC,,, | Performed by: OBSTETRICS & GYNECOLOGY

## 2019-09-26 PROCEDURE — 77063 BREAST TOMOSYNTHESIS BI: CPT | Mod: 26,,, | Performed by: RADIOLOGY

## 2019-09-26 PROCEDURE — 88175 CYTOPATH C/V AUTO FLUID REDO: CPT

## 2019-09-26 PROCEDURE — 99999 PR PBB SHADOW E&M-EST. PATIENT-LVL III: ICD-10-PCS | Mod: PBBFAC,,, | Performed by: OBSTETRICS & GYNECOLOGY

## 2019-09-26 RX ORDER — ESTRADIOL 1 MG/1
1 TABLET ORAL DAILY
Qty: 90 TABLET | Refills: 3 | Status: SHIPPED | OUTPATIENT
Start: 2019-09-26 | End: 2020-10-12 | Stop reason: SDUPTHER

## 2019-09-26 RX ORDER — ESTRADIOL 1 MG/1
1 TABLET ORAL DAILY
COMMUNITY
End: 2019-09-26 | Stop reason: SDUPTHER

## 2019-09-26 NOTE — PROGRESS NOTES
"  HPI:  58 y.o.   OB History        4    Para   4    Term   2            AB        Living   2       SAB        TAB        Ectopic        Multiple        Live Births   2              Patient's last menstrual period was 1998.   Patient here for her annual gynecologic exam.  She has no complaints at this time.    ROS:  GENERAL: No fever, chills, fatigability or weight loss.  SKIN: No rashes, itching or changes in color or texture of skin.  HEAD: No headaches or recent head trauma.  EYES: Visual acuity fine. No photophobia, ocular pain or diplopia.  EARS: Denies ear pain, discharge or vertigo.  NOSE: No loss of smell, no epistaxis or postnasal drip.  MOUTH & THROAT: No hoarseness or change in voice. No excessive gum bleeding.  NODES: Denies swollen glands.  CHEST: Denies YOUNG, cyanosis, wheezing, cough and sputum production.  CARDIOVASCULAR: Denies chest pain, PND, orthopnea or reduced exercise tolerance.  ABDOMEN: Appetite fine. No weight loss. Denies diarrhea, abdominal pain, hematemesis or blood in stool.  URINARY: No flank pain, dysuria or hematuria.  PERIPHERAL VASCULAR: No claudication or cyanosis.  MUSCULOSKELETAL: No joint stiffness or swelling. Denies back pain.  NEUROLOGIC: No history of seizures, paralysis, alteration of gait or coordination.    PE:   /68   Ht 5' 2" (1.575 m)   Wt 48.1 kg (106 lb 2.4 oz)   LMP 1998   BMI 19.42 kg/m²   APPEARANCE: Well nourished, well developed, in no acute distress.  NECK: Neck symmetric without masses or thyromegaly.  NODES: No inguinal lymph node enlargement.  ABDOMEN: Soft. No tenderness or masses. No hepatosplenomegaly. No hernias.  BREASTS: Symmetrical, no skin changes or visible lesions. No palpable masses, nipple discharge or adenopathy bilaterally.  PELVIC: Normal external female genitalia without lesions. Normal hair distribution. Adequate perineal body, normal urethral meatus. Vagina moist and well rugated without lesions or " discharge. No significant cystocele or rectocele. Uterus and cervix surgically absent. Bimanual exam revealed no masses, tenderness or abnormality.    Procedure:  Pap Smear    Assessment:  Normal Gynecologic Exam    Plan:  Mammogram and Colonoscopy as per current recommendations.   Return to clinic in one year or for any problems or complaints.

## 2019-09-27 ENCOUNTER — IMMUNIZATION (OUTPATIENT)
Dept: PHARMACY | Facility: CLINIC | Age: 59
End: 2019-09-27
Payer: COMMERCIAL

## 2019-11-03 ENCOUNTER — PATIENT OUTREACH (OUTPATIENT)
Dept: ADMINISTRATIVE | Facility: OTHER | Age: 59
End: 2019-11-03

## 2019-11-04 ENCOUNTER — OFFICE VISIT (OUTPATIENT)
Dept: OPTOMETRY | Facility: CLINIC | Age: 59
End: 2019-11-04
Payer: COMMERCIAL

## 2019-11-04 DIAGNOSIS — H52.4 PRESBYOPIA OF BOTH EYES: ICD-10-CM

## 2019-11-04 DIAGNOSIS — Z13.5 SCREENING FOR EYE CONDITION: ICD-10-CM

## 2019-11-04 DIAGNOSIS — H52.203 MYOPIA OF BOTH EYES WITH ASTIGMATISM: ICD-10-CM

## 2019-11-04 DIAGNOSIS — H25.13 NUCLEAR SCLEROSIS OF BOTH EYES: Primary | ICD-10-CM

## 2019-11-04 DIAGNOSIS — H52.13 MYOPIA OF BOTH EYES WITH ASTIGMATISM: ICD-10-CM

## 2019-11-04 PROCEDURE — 99999 PR PBB SHADOW E&M-EST. PATIENT-LVL III: ICD-10-PCS | Mod: PBBFAC,,, | Performed by: OPTOMETRIST

## 2019-11-04 PROCEDURE — 92004 COMPRE OPH EXAM NEW PT 1/>: CPT | Mod: S$GLB,,, | Performed by: OPTOMETRIST

## 2019-11-04 PROCEDURE — 92015 PR REFRACTION: ICD-10-PCS | Mod: S$GLB,,, | Performed by: OPTOMETRIST

## 2019-11-04 PROCEDURE — 99999 PR PBB SHADOW E&M-EST. PATIENT-LVL III: CPT | Mod: PBBFAC,,, | Performed by: OPTOMETRIST

## 2019-11-04 PROCEDURE — 92004 PR EYE EXAM, NEW PATIENT,COMPREHESV: ICD-10-PCS | Mod: S$GLB,,, | Performed by: OPTOMETRIST

## 2019-11-04 PROCEDURE — 92015 DETERMINE REFRACTIVE STATE: CPT | Mod: S$GLB,,, | Performed by: OPTOMETRIST

## 2019-11-04 NOTE — PATIENT INSTRUCTIONS
Trace nuclear sclerosis of lens of both eyes, consistent with age.  Good ocular health in both eyes, otherwise.  Myopia with astigmatism in each eye.  Good best correctable VA in each eye, slightly better in the right eye than in the left eye.  Presbyopia consistent with age.  New spectacle lens Rx issued for use as desired.    Recheck in one year - or prior, if any problems in the interim.

## 2019-11-04 NOTE — PROGRESS NOTES
"HPI     eye examination       Additional comments: Pt states near vision changes               Comments     Patient's age: 55 y.o. WF  Approximate date of last eye examination:  16 mos   Name of last eye doctor seen: Dr. Frey  Wears glasses? yes     If yes, wears  Full-time or part-time?  Full-time  Present glasses are: Bifocal, SV Distance, SV Reading?  Progressive lenses  Approximate age of present glasses:  16 mos   Got new glasses following last exam, or subsequently?:  yes   Any problem with VA with glasses?  Near vision changes   Wears CLs?:  no  Headaches?  no  Eye pain/discomfort?  no                                                                                     Flashes?  no  Floaters?  no  Diplopia/Double vision?  no  Patient's Ocular History:         Any eye surgeries? No          Any eye injury?  No          Any treatment for eye disease?  No  Family history of eye disease?  No   Significant patient medical history:         1. Diabetes?  no       If yes, IDDM or NIDDM? n/a   2. HBP?  no              3. Other (describe):  No   ! OTC eyedrops currently using:  No    ! Prescription eye meds currently using:  no   ! Any history of allergy/adverse reaction to any eye meds used   previously?  no    ! Any history of allergy/adverse reaction to eyedrops used during prior   eye exam(s)? no    ! Any history of allergy/adverse reaction to Novacaine or similar meds?   no   ! Any history of allergy/adverse reaction to Epinephrine or similar meds?   no    ! Patient okay with use of anesthetic eyedrops to check eye pressure?    yes        ! Patient okay with use of eyedrops to dilate pupils today?  Yes   !  Allergies/Medications/Medical History/Family History reviewed today?    yes      PD =   61/57  Desired reading distance =  12" (use 13")                                                                       Last edited by Santo Garcia, OD on 11/4/2019  8:21 AM. (History)            Assessment /Plan "     For exam results, see Encounter Report.    1. Nuclear sclerosis of both eyes     2. Myopia of both eyes with astigmatism     3. Presbyopia of both eyes     4. Screening for eye condition                    Trace nuclear sclerosis of lens of both eyes, consistent with age.  Good ocular health in both eyes, otherwise.  Myopia with astigmatism in each eye.  Good best correctable VA in each eye, slightly better in the right eye than in the left eye.  Presbyopia consistent with age.  New spectacle lens Rx issued for use as desired.    Recheck in one year - or prior, if any problems in the interim.

## 2020-03-30 ENCOUNTER — PATIENT MESSAGE (OUTPATIENT)
Dept: INTERNAL MEDICINE | Facility: CLINIC | Age: 60
End: 2020-03-30

## 2020-07-28 ENCOUNTER — PATIENT MESSAGE (OUTPATIENT)
Dept: OBSTETRICS AND GYNECOLOGY | Facility: CLINIC | Age: 60
End: 2020-07-28

## 2020-07-28 DIAGNOSIS — Z12.31 ENCOUNTER FOR SCREENING MAMMOGRAM FOR BREAST CANCER: Primary | ICD-10-CM

## 2020-07-31 ENCOUNTER — PATIENT MESSAGE (OUTPATIENT)
Dept: INTERNAL MEDICINE | Facility: CLINIC | Age: 60
End: 2020-07-31

## 2020-07-31 DIAGNOSIS — Z00.00 ROUTINE PHYSICAL EXAMINATION: ICD-10-CM

## 2020-07-31 DIAGNOSIS — E03.9 HYPOTHYROIDISM, UNSPECIFIED TYPE: ICD-10-CM

## 2020-07-31 DIAGNOSIS — Z01.419 ROUTINE GYNECOLOGICAL EXAMINATION: Primary | ICD-10-CM

## 2020-07-31 DIAGNOSIS — E53.8 B12 DEFICIENCY: ICD-10-CM

## 2020-07-31 DIAGNOSIS — E55.9 VITAMIN D DEFICIENCY: ICD-10-CM

## 2020-08-04 ENCOUNTER — PATIENT OUTREACH (OUTPATIENT)
Dept: ADMINISTRATIVE | Facility: HOSPITAL | Age: 60
End: 2020-08-04

## 2020-08-07 LAB
25(OH)D3 SERPL-MCNC: 44 NG/ML (ref 30–100)
ALBUMIN SERPL-MCNC: 4.1 G/DL (ref 3.6–5.1)
ALBUMIN/GLOB SERPL: 2 (CALC) (ref 1–2.5)
ALP SERPL-CCNC: 59 U/L (ref 37–153)
ALT SERPL-CCNC: 10 U/L (ref 6–29)
AST SERPL-CCNC: 18 U/L (ref 10–35)
BASOPHILS # BLD AUTO: 52 CELLS/UL (ref 0–200)
BASOPHILS NFR BLD AUTO: 1 %
BILIRUB SERPL-MCNC: 0.5 MG/DL (ref 0.2–1.2)
BUN SERPL-MCNC: 9 MG/DL (ref 7–25)
BUN/CREAT SERPL: NORMAL (CALC) (ref 6–22)
CALCIUM SERPL-MCNC: 9.1 MG/DL (ref 8.6–10.4)
CHLORIDE SERPL-SCNC: 107 MMOL/L (ref 98–110)
CHOLEST SERPL-MCNC: 205 MG/DL
CHOLEST/HDLC SERPL: 2.9 (CALC)
CO2 SERPL-SCNC: 29 MMOL/L (ref 20–32)
CREAT SERPL-MCNC: 0.68 MG/DL (ref 0.5–1.05)
EOSINOPHIL # BLD AUTO: 130 CELLS/UL (ref 15–500)
EOSINOPHIL NFR BLD AUTO: 2.5 %
ERYTHROCYTE [DISTWIDTH] IN BLOOD BY AUTOMATED COUNT: 12.4 % (ref 11–15)
GFRSERPLBLD MDRD-ARVRAT: 96 ML/MIN/1.73M2
GLOBULIN SER CALC-MCNC: 2.1 G/DL (CALC) (ref 1.9–3.7)
GLUCOSE SERPL-MCNC: 80 MG/DL (ref 65–99)
HCT VFR BLD AUTO: 39.1 % (ref 35–45)
HDLC SERPL-MCNC: 71 MG/DL
HGB BLD-MCNC: 12.7 G/DL (ref 11.7–15.5)
LDLC SERPL CALC-MCNC: 112 MG/DL (CALC)
LYMPHOCYTES # BLD AUTO: 2106 CELLS/UL (ref 850–3900)
LYMPHOCYTES NFR BLD AUTO: 40.5 %
MCH RBC QN AUTO: 31.1 PG (ref 27–33)
MCHC RBC AUTO-ENTMCNC: 32.5 G/DL (ref 32–36)
MCV RBC AUTO: 95.8 FL (ref 80–100)
MONOCYTES # BLD AUTO: 395 CELLS/UL (ref 200–950)
MONOCYTES NFR BLD AUTO: 7.6 %
NEUTROPHILS # BLD AUTO: 2517 CELLS/UL (ref 1500–7800)
NEUTROPHILS NFR BLD AUTO: 48.4 %
NONHDLC SERPL-MCNC: 134 MG/DL (CALC)
PLATELET # BLD AUTO: 213 THOUSAND/UL (ref 140–400)
PMV BLD REES-ECKER: 11.5 FL (ref 7.5–12.5)
POTASSIUM SERPL-SCNC: 3.9 MMOL/L (ref 3.5–5.3)
PROT SERPL-MCNC: 6.2 G/DL (ref 6.1–8.1)
RBC # BLD AUTO: 4.08 MILLION/UL (ref 3.8–5.1)
SODIUM SERPL-SCNC: 142 MMOL/L (ref 135–146)
TRIGL SERPL-MCNC: 111 MG/DL
TSH SERPL-ACNC: 2.35 MIU/L (ref 0.4–4.5)
VIT B12 SERPL-MCNC: 272 PG/ML (ref 200–1100)
WBC # BLD AUTO: 5.2 THOUSAND/UL (ref 3.8–10.8)

## 2020-08-18 ENCOUNTER — OFFICE VISIT (OUTPATIENT)
Dept: INTERNAL MEDICINE | Facility: CLINIC | Age: 60
End: 2020-08-18
Payer: COMMERCIAL

## 2020-08-18 VITALS — BODY MASS INDEX: 19.6 KG/M2 | WEIGHT: 106.5 LBS | HEIGHT: 62 IN

## 2020-08-18 DIAGNOSIS — E55.9 VITAMIN D DEFICIENCY: ICD-10-CM

## 2020-08-18 DIAGNOSIS — L98.9 SKIN LESION: ICD-10-CM

## 2020-08-18 DIAGNOSIS — Z00.00 ROUTINE PHYSICAL EXAMINATION: Primary | ICD-10-CM

## 2020-08-18 DIAGNOSIS — Z12.11 COLON CANCER SCREENING: ICD-10-CM

## 2020-08-18 DIAGNOSIS — E03.9 HYPOTHYROIDISM, UNSPECIFIED TYPE: ICD-10-CM

## 2020-08-18 DIAGNOSIS — E53.8 B12 DEFICIENCY: ICD-10-CM

## 2020-08-18 PROCEDURE — 99999 PR PBB SHADOW E&M-EST. PATIENT-LVL III: ICD-10-PCS | Mod: PBBFAC,,, | Performed by: INTERNAL MEDICINE

## 2020-08-18 PROCEDURE — 99999 PR PBB SHADOW E&M-EST. PATIENT-LVL III: CPT | Mod: PBBFAC,,, | Performed by: INTERNAL MEDICINE

## 2020-08-18 PROCEDURE — 3008F BODY MASS INDEX DOCD: CPT | Mod: CPTII,S$GLB,, | Performed by: INTERNAL MEDICINE

## 2020-08-18 PROCEDURE — 99396 PR PREVENTIVE VISIT,EST,40-64: ICD-10-PCS | Mod: S$GLB,,, | Performed by: INTERNAL MEDICINE

## 2020-08-18 PROCEDURE — 99396 PREV VISIT EST AGE 40-64: CPT | Mod: S$GLB,,, | Performed by: INTERNAL MEDICINE

## 2020-08-18 PROCEDURE — 3008F PR BODY MASS INDEX (BMI) DOCUMENTED: ICD-10-PCS | Mod: CPTII,S$GLB,, | Performed by: INTERNAL MEDICINE

## 2020-08-18 RX ORDER — LEVOTHYROXINE SODIUM 75 UG/1
75 TABLET ORAL DAILY
Qty: 90 TABLET | Refills: 3 | Status: SHIPPED | OUTPATIENT
Start: 2020-08-18 | End: 2021-08-19 | Stop reason: SDUPTHER

## 2020-08-18 NOTE — PROGRESS NOTES
Subjective:       Patient ID: Kirti Romero is a 59 y.o. female.    Chief Complaint: Annual Exam    Very pleasant 59-year-old female comes in for annual exam.  She says she is doing very well.  She is up-to-date on health maintenance including vaccines, eye exams mammograms in gyn checkup.  We reviewed her labs in detail and her trends are all good except she has a minor decline in B12.  Three years ago it was 500 and now down to nearly 200.  She sometimes feels possible numbness in her feet and I explained it could relate to that.  We will have her supplementing see if that helps.  Her other labs are all stable.  She has no other complaints.  Staying active.  Eating healthy, cholesterol has dropped from 230-204.  Her HDL ratio is stable.    Review of Systems   Constitutional: Positive for activity change. Negative for appetite change, chills, fever and unexpected weight change.   HENT: Negative for hearing loss, nosebleeds, rhinorrhea, sore throat and trouble swallowing.    Eyes: Negative for discharge and visual disturbance.   Respiratory: Negative for cough, chest tightness, shortness of breath and wheezing.    Cardiovascular: Negative for chest pain, palpitations and leg swelling.   Gastrointestinal: Negative for abdominal pain, blood in stool, constipation, diarrhea and vomiting.   Endocrine: Negative for polydipsia and polyuria.   Genitourinary: Negative for difficulty urinating, dysuria, hematuria and menstrual problem.   Musculoskeletal: Negative for arthralgias, joint swelling, neck pain and neck stiffness.   Integumentary:  Negative for pallor and rash.   Neurological: Positive for numbness (Occasional minor numbness in the feet). Negative for weakness and headaches.   Psychiatric/Behavioral: Negative for confusion, dysphoric mood and suicidal ideas. The patient is not nervous/anxious.          Objective:      Physical Exam  Constitutional:       General: She is not in acute distress.     Appearance:  She is well-developed.   HENT:      Head: Normocephalic and atraumatic.      Right Ear: Tympanic membrane, ear canal and external ear normal. There is no impacted cerumen.      Left Ear: Tympanic membrane, ear canal and external ear normal.      Nose: No rhinorrhea.      Mouth/Throat:      Pharynx: No oropharyngeal exudate or posterior oropharyngeal erythema.   Eyes:      General: No scleral icterus.     Conjunctiva/sclera: Conjunctivae normal.      Pupils: Pupils are equal, round, and reactive to light.   Neck:      Musculoskeletal: Normal range of motion and neck supple.      Thyroid: No thyromegaly.   Cardiovascular:      Rate and Rhythm: Normal rate and regular rhythm.      Pulses: Normal pulses.      Heart sounds: Normal heart sounds. No murmur.   Pulmonary:      Effort: Pulmonary effort is normal.      Breath sounds: Normal breath sounds. No wheezing.   Abdominal:      General: Bowel sounds are normal. There is no distension.      Palpations: Abdomen is soft.      Tenderness: There is no abdominal tenderness.   Musculoskeletal:         General: No tenderness.   Lymphadenopathy:      Cervical: No cervical adenopathy.   Skin:     Findings: Erythema (Small skin lesion left lateral bicep.  Red slightly scaly.) present. No bruising or rash.   Neurological:      Mental Status: She is alert and oriented to person, place, and time.      Sensory: No sensory deficit.      Coordination: Coordination normal.      Deep Tendon Reflexes: Reflexes normal.   Psychiatric:         Mood and Affect: Mood normal.         Behavior: Behavior normal.         Assessment:       1. Routine physical examination    2. Colon cancer screening    3. Hypothyroidism, unspecified type    4. Skin lesion    5. Vitamin D deficiency    6. B12 deficiency        Plan:       Kirti Nagel was seen today for annual exam.    Diagnoses and all orders for this visit:    Routine physical examination  -     Vitamin D; Future  -     Vitamin B12; Future  -      TSH; Future  -     Lipid Panel; Future  -     Hemoglobin A1C; Future  -     Comprehensive metabolic panel; Future  -     CBC auto differential; Future  -     Vitamin D  -     Vitamin B12  -     TSH  -     Lipid Panel  -     Hemoglobin A1C  -     Comprehensive metabolic panel  -     CBC auto differential    Colon cancer screening  -     Cologuard Screening (Multitarget Stool DNA); Future  -     Cologuard Screening (Multitarget Stool DNA)    Hypothyroidism, unspecified type  -     Vitamin D; Future  -     Vitamin B12; Future  -     TSH; Future  -     Lipid Panel; Future  -     Hemoglobin A1C; Future  -     Comprehensive metabolic panel; Future  -     CBC auto differential; Future  -     Vitamin D  -     Vitamin B12  -     TSH  -     Lipid Panel  -     Hemoglobin A1C  -     Comprehensive metabolic panel  -     CBC auto differential    Skin lesion  -     Ambulatory referral/consult to Dermatology; Future    Vitamin D deficiency  -     Vitamin D; Future  -     Vitamin B12; Future  -     TSH; Future  -     Lipid Panel; Future  -     Hemoglobin A1C; Future  -     Comprehensive metabolic panel; Future  -     CBC auto differential; Future  -     Vitamin D  -     Vitamin B12  -     TSH  -     Lipid Panel  -     Hemoglobin A1C  -     Comprehensive metabolic panel  -     CBC auto differential    B12 deficiency  -     Vitamin D; Future  -     Vitamin B12; Future  -     TSH; Future  -     Lipid Panel; Future  -     Hemoglobin A1C; Future  -     Comprehensive metabolic panel; Future  -     CBC auto differential; Future  -     Vitamin D  -     Vitamin B12  -     TSH  -     Lipid Panel  -     Hemoglobin A1C  -     Comprehensive metabolic panel  -     CBC auto differential    Other orders  -     levothyroxine (SYNTHROID) 75 MCG tablet; Take 1 tablet (75 mcg total) by mouth once daily.            Follow up annually.   Start B12 1000 units daily.

## 2020-08-21 ENCOUNTER — PATIENT MESSAGE (OUTPATIENT)
Dept: INTERNAL MEDICINE | Facility: CLINIC | Age: 60
End: 2020-08-21

## 2020-09-09 ENCOUNTER — TELEPHONE (OUTPATIENT)
Dept: INTERNAL MEDICINE | Facility: CLINIC | Age: 60
End: 2020-09-09

## 2020-09-09 NOTE — TELEPHONE ENCOUNTER
----- Message from Priya Tovar sent at 9/9/2020 10:48 AM CDT -----  Type:  Needs Medical Advice    Who Called: JACOB ORTIZ/[employer].     Would the patient rather a call back or a response via [facility]? CALL BACK     Best Call Back Number: [phone]       REF [phone]    Additional Information: JACOB WOULD LIKE TO KNOW IF HER FAX WAS RECEIVED WITH ABNORMAL RESULTS FOR THIS PT

## 2020-09-11 ENCOUNTER — TELEPHONE (OUTPATIENT)
Dept: INTERNAL MEDICINE | Facility: CLINIC | Age: 60
End: 2020-09-11

## 2020-09-11 DIAGNOSIS — Z12.11 COLON CANCER SCREENING: ICD-10-CM

## 2020-09-11 DIAGNOSIS — R19.5 POSITIVE COLORECTAL CANCER SCREENING USING COLOGUARD TEST: Primary | ICD-10-CM

## 2020-09-14 DIAGNOSIS — Z12.11 ENCOUNTER FOR SCREENING COLONOSCOPY: Primary | ICD-10-CM

## 2020-09-14 DIAGNOSIS — Z01.818 PRE-OP TESTING: Primary | ICD-10-CM

## 2020-09-14 RX ORDER — POLYETHYLENE GLYCOL 3350, SODIUM SULFATE ANHYDROUS, SODIUM BICARBONATE, SODIUM CHLORIDE, POTASSIUM CHLORIDE 236; 22.74; 6.74; 5.86; 2.97 G/4L; G/4L; G/4L; G/4L; G/4L
4 POWDER, FOR SOLUTION ORAL ONCE
Qty: 4000 ML | Refills: 0 | Status: SHIPPED | OUTPATIENT
Start: 2020-09-14 | End: 2020-09-14

## 2020-09-19 ENCOUNTER — LAB VISIT (OUTPATIENT)
Dept: URGENT CARE | Facility: CLINIC | Age: 60
End: 2020-09-19
Payer: COMMERCIAL

## 2020-09-19 DIAGNOSIS — Z01.818 PRE-OP TESTING: ICD-10-CM

## 2020-09-19 PROCEDURE — 99000 PR SPECIMEN HANDLING,DR OFF->LAB: ICD-10-PCS | Mod: S$GLB,,, | Performed by: PHYSICIAN ASSISTANT

## 2020-09-19 PROCEDURE — 99000 SPECIMEN HANDLING OFFICE-LAB: CPT | Mod: S$GLB,,, | Performed by: PHYSICIAN ASSISTANT

## 2020-09-19 PROCEDURE — U0003 INFECTIOUS AGENT DETECTION BY NUCLEIC ACID (DNA OR RNA); SEVERE ACUTE RESPIRATORY SYNDROME CORONAVIRUS 2 (SARS-COV-2) (CORONAVIRUS DISEASE [COVID-19]), AMPLIFIED PROBE TECHNIQUE, MAKING USE OF HIGH THROUGHPUT TECHNOLOGIES AS DESCRIBED BY CMS-2020-01-R: HCPCS

## 2020-09-20 LAB — SARS-COV-2 RNA RESP QL NAA+PROBE: NOT DETECTED

## 2020-09-21 ENCOUNTER — PATIENT OUTREACH (OUTPATIENT)
Dept: ADMINISTRATIVE | Facility: HOSPITAL | Age: 60
End: 2020-09-21

## 2020-09-22 ENCOUNTER — ANESTHESIA (OUTPATIENT)
Dept: ENDOSCOPY | Facility: HOSPITAL | Age: 60
End: 2020-09-22
Payer: COMMERCIAL

## 2020-09-22 ENCOUNTER — ANESTHESIA EVENT (OUTPATIENT)
Dept: ENDOSCOPY | Facility: HOSPITAL | Age: 60
End: 2020-09-22
Payer: COMMERCIAL

## 2020-09-22 ENCOUNTER — PATIENT MESSAGE (OUTPATIENT)
Dept: DERMATOLOGY | Facility: CLINIC | Age: 60
End: 2020-09-22

## 2020-09-22 ENCOUNTER — PATIENT OUTREACH (OUTPATIENT)
Dept: ADMINISTRATIVE | Facility: OTHER | Age: 60
End: 2020-09-22

## 2020-09-22 ENCOUNTER — HOSPITAL ENCOUNTER (OUTPATIENT)
Facility: HOSPITAL | Age: 60
Discharge: HOME | End: 2020-09-22
Attending: INTERNAL MEDICINE | Admitting: INTERNAL MEDICINE
Payer: COMMERCIAL

## 2020-09-22 VITALS
SYSTOLIC BLOOD PRESSURE: 110 MMHG | OXYGEN SATURATION: 99 % | HEIGHT: 61 IN | HEART RATE: 82 BPM | DIASTOLIC BLOOD PRESSURE: 62 MMHG | TEMPERATURE: 98 F | WEIGHT: 105 LBS | BODY MASS INDEX: 19.83 KG/M2 | RESPIRATION RATE: 14 BRPM

## 2020-09-22 DIAGNOSIS — R19.5 POSITIVE FIT (FECAL IMMUNOCHEMICAL TEST): Primary | ICD-10-CM

## 2020-09-22 PROCEDURE — 37000008 HC ANESTHESIA 1ST 15 MINUTES: Performed by: INTERNAL MEDICINE

## 2020-09-22 PROCEDURE — G0121 COLON CA SCRN NOT HI RSK IND: HCPCS | Performed by: INTERNAL MEDICINE

## 2020-09-22 PROCEDURE — 63600175 PHARM REV CODE 636 W HCPCS: Performed by: STUDENT IN AN ORGANIZED HEALTH CARE EDUCATION/TRAINING PROGRAM

## 2020-09-22 PROCEDURE — E9220 PRA ENDO ANESTHESIA: HCPCS | Mod: ,,, | Performed by: STUDENT IN AN ORGANIZED HEALTH CARE EDUCATION/TRAINING PROGRAM

## 2020-09-22 PROCEDURE — E9220 PRA ENDO ANESTHESIA: ICD-10-PCS | Mod: ,,, | Performed by: STUDENT IN AN ORGANIZED HEALTH CARE EDUCATION/TRAINING PROGRAM

## 2020-09-22 PROCEDURE — G0121 COLON CA SCRN NOT HI RSK IND: ICD-10-PCS | Mod: ,,, | Performed by: INTERNAL MEDICINE

## 2020-09-22 PROCEDURE — 37000009 HC ANESTHESIA EA ADD 15 MINS: Performed by: INTERNAL MEDICINE

## 2020-09-22 PROCEDURE — G0121 COLON CA SCRN NOT HI RSK IND: HCPCS | Mod: ,,, | Performed by: INTERNAL MEDICINE

## 2020-09-22 PROCEDURE — 25000003 PHARM REV CODE 250: Performed by: INTERNAL MEDICINE

## 2020-09-22 RX ORDER — SODIUM CHLORIDE 9 MG/ML
INJECTION, SOLUTION INTRAVENOUS CONTINUOUS
Status: DISCONTINUED | OUTPATIENT
Start: 2020-09-22 | End: 2020-09-22 | Stop reason: HOSPADM

## 2020-09-22 RX ORDER — CHOLECALCIFEROL (VITAMIN D3) 25 MCG
1000 TABLET ORAL DAILY
COMMUNITY

## 2020-09-22 RX ORDER — PROPOFOL 10 MG/ML
VIAL (ML) INTRAVENOUS
Status: DISCONTINUED | OUTPATIENT
Start: 2020-09-22 | End: 2020-09-22

## 2020-09-22 RX ORDER — LANOLIN ALCOHOL/MO/W.PET/CERES
100 CREAM (GRAM) TOPICAL DAILY
COMMUNITY

## 2020-09-22 RX ORDER — LIDOCAINE HCL/PF 100 MG/5ML
SYRINGE (ML) INTRAVENOUS
Status: DISCONTINUED | OUTPATIENT
Start: 2020-09-22 | End: 2020-09-22

## 2020-09-22 RX ORDER — PROPOFOL 10 MG/ML
VIAL (ML) INTRAVENOUS CONTINUOUS PRN
Status: DISCONTINUED | OUTPATIENT
Start: 2020-09-22 | End: 2020-09-22

## 2020-09-22 RX ADMIN — SODIUM CHLORIDE: 0.9 INJECTION, SOLUTION INTRAVENOUS at 01:09

## 2020-09-22 RX ADMIN — PROPOFOL 175 MCG/KG/MIN: 10 INJECTION, EMULSION INTRAVENOUS at 02:09

## 2020-09-22 RX ADMIN — Medication 100 MG: at 02:09

## 2020-09-22 RX ADMIN — PROPOFOL 80 MG: 10 INJECTION, EMULSION INTRAVENOUS at 02:09

## 2020-09-22 NOTE — [employer] PATIENT INSTRUCTIONS
Discharge Summary/Instructions after an Endoscopic Procedure  Patient Name: Kirti Romero  Patient MRN: [id number]  Patient YOB: 1960  Tuesday, September 22, 2020  Alberto Santos MD  RESTRICTIONS:  During your procedure today, you received medications for sedation.  These   medications may affect your judgment, balance and coordination.  Therefore,   for 24 hours, you have the following restrictions:   - DO NOT drive a car, operate machinery, make legal/financial decisions,   sign important papers or drink alcohol.    ACTIVITY:  Today: no heavy lifting, straining or running due to procedural   sedation/anesthesia.  The following day: return to full activity including work.  DIET:  Eat and drink normally unless instructed otherwise.     TREATMENT FOR COMMON SIDE EFFECTS:  - Mild abdominal pain, nausea, belching, bloating or excessive gas:  rest,   eat lightly and use a heating pad.  - Sore Throat: treat with throat lozenges and/or gargle with warm salt   water.  - Because air was used during the procedure, expelling large amounts of air   from your rectum or belching is normal.  - If a bowel prep was taken, you may not have a bowel movement for 1-3 days.    This is normal.  SYMPTOMS TO WATCH FOR AND REPORT TO YOUR PHYSICIAN:  1. Abdominal pain or bloating, other than gas cramps.  2. Chest pain.  3. Back pain.  4. Signs of infection such as: chills or fever occurring within 24 hours   after the procedure.  5. Rectal bleeding, which would show as bright red, maroon, or black stools.   (A tablespoon of blood from the rectum is not serious, especially if   hemorrhoids are present.)  6. Vomiting.  7. Weakness or dizziness.  GO DIRECTLY TO THE NEAREST EMERGENCY ROOM IF YOU HAVE ANY OF THE FOLLOWING:      Difficulty breathing              Chills and/or fever over 101 F   Persistent vomiting and/or vomiting blood   Severe abdominal pain   Severe chest pain   Black, tarry stools   Bleeding- more than one  tablespoon   Any other symptom or condition that you feel may need urgent attention  Your doctor recommends these additional instructions:  If any biopsies were taken, your doctors clinic will contact you in 1 to 2   weeks with any results.  - Patient has a contact number available for emergencies.  The signs and   symptoms of potential delayed complications were discussed with the   patient.  Return to normal activities tomorrow.  Written discharge   instructions were provided to the patient.   - Discharge patient to home.   - Resume previous diet.   - Continue present medications.   - Repeat colonoscopy in 10 years for screening purposes.   For questions, problems or results please call your physician - Alberto Santos MD at Work:  [phone].  [facility], EMERGENCY ROOM PHONE NUMBER: [phone]  IF A COMPLICATION OR EMERGENCY SITUATION ARISES AND YOU ARE UNABLE TO REACH   YOUR PHYSICIAN - GO DIRECTLY TO THE EMERGENCY ROOM.  Alberto Santos MD  9/22/2020 2:56:05 PM  This report has been verified and signed electronically.  [employer]

## 2020-09-22 NOTE — ANESTHESIA POSTPROCEDURE EVALUATION
Anesthesia Post Evaluation    Patient: Kirti Romero    Procedure(s) Performed: Procedure(s) (LRB):  COLONOSCOPY (N/A)    Final Anesthesia Type: general    Patient location during evaluation: PACU  Patient participation: Yes- Able to Participate  Level of consciousness: awake and alert  Post-procedure vital signs: reviewed and stable  Pain management: adequate  Airway patency: patent    PONV status at discharge: No PONV  Anesthetic complications: no      Cardiovascular status: blood pressure returned to baseline  Respiratory status: spontaneous ventilation and room air  Hydration status: euvolemic  Follow-up not needed.          Vitals Value Taken Time   /62 09/22/20 1524   Temp 36.7 °C (98 °F) 09/22/20 1454   Pulse 82 09/22/20 1524   Resp 14 09/22/20 1524   SpO2 99 % 09/22/20 1524         Event Time   Out of Recovery 15:26:28         Pain/Griselda Score: Griselda Score: 10 (9/22/2020  3:24 PM)

## 2020-09-22 NOTE — ANESTHESIA PREPROCEDURE EVALUATION
09/22/2020  Kirti Romero is a 59 y.o., female.    Patient Active Problem List   Diagnosis    Hypothyroidism    Positive FIT (fecal immunochemical test)     Past Medical History:   Diagnosis Date    Abnormal Pap smear     Treated with Hysterectomy    Breast cyst 03/01/2016    rt breast    Breast disorder      Past Surgical History:   Procedure Laterality Date    AUGMENTATION OF BREAST Bilateral 2003    BREAST BIOPSY Right 03/01/2016    cyst that popped    BREAST SURGERY Bilateral 06/01/2003    HYSTERECTOMY  09/1998    OOPHORECTOMY  1998    TONSILLECTOMY         Anesthesia Evaluation    I have reviewed the Patient Summary Reports.    I have reviewed the Nursing Notes.       Review of Systems  Anesthesia Hx:  No problems with previous Anesthesia Denies Hx of Anesthetic complications  Denies Family Hx of Anesthesia complications.   Denies Personal Hx of Anesthesia complications.   Social:  Non-Smoker, No Alcohol Use    Cardiovascular:   Exercise tolerance: good        Physical Exam  General:  Well nourished    Airway/Jaw/Neck:  Airway Findings: Mouth Opening: Normal Tongue: Normal  General Airway Assessment: Adult  Mallampati: II  Improves to II with phonation.  TM Distance: Normal, at least 6 cm  Jaw/Neck Findings:     Neck ROM: Normal ROM      Dental:  Dental Findings:   Chest/Lungs:  Chest/Lungs Findings: Clear to auscultation, Normal Respiratory Rate     Heart/Vascular:  Heart Findings: Rate: Normal  Rhythm: Regular Rhythm  Sounds: Normal     Abdomen:  Abdomen Findings:  Normal, Soft, Nontender       Mental Status:  Mental Status Findings:  Cooperative, Alert and Oriented         Anesthesia Plan  Type of Anesthesia, risks & benefits discussed:  Anesthesia Type:  general  Patient's Preference:   Intra-op Monitoring Plan: standard ASA monitors  Intra-op Monitoring Plan Comments:   Post Op Pain  Control Plan: per primary service following discharge from PACU  Post Op Pain Control Plan Comments:   Induction:   IV  Beta Blocker:  Patient is not currently on a Beta-Blocker (No further documentation required).       Informed Consent: Patient understands risks and agrees with Anesthesia plan.  Questions answered. Anesthesia consent signed with patient.  ASA Score: 2     Day of Surgery Review of History & Physical: I have interviewed and examined the patient. I have reviewed the patient's H&P dated:  There are no significant changes.          Ready For Surgery From Anesthesia Perspective.

## 2020-09-22 NOTE — H&P
Short Stay Endoscopy History and Physical    PCP - Howard Gates MD    Procedure - Colonoscopy  Sedation: GA  ASA - per anesthesia  Mallampati - per anesthesia  History of Anesthesia problems - no  Family history Anesthesia problems -  no     HPI:  This is a 59 y.o. female here for evaluation of : Positive FIT    Reflux - no  Dysphagia - no  Abdominal pain - no  Diarrhea - no    ROS:  Constitutional: No fevers, chills, No weight loss  ENT: No allergies  CV: No chest pain  Pulm: No cough, No shortness of breath  Ophtho: No vision changes  GI: see HPI  Medical History:  has a past medical history of Abnormal Pap smear, Breast cyst (03/01/2016), and Breast disorder.    Surgical History:  has a past surgical history that includes Tonsillectomy; Breast biopsy (Right, 03/01/2016); Hysterectomy (09/1998); Oophorectomy (1998); Breast surgery (Bilateral, 06/01/2003); and Augmentation of breast (Bilateral, 2003).    Family History: family history includes Breast cancer in her paternal grandmother; Diabetes in her mother; Stroke in her paternal grandfather.. Otherwise no colon cancer, inflammatory bowel disease, or GI malignancies.    Social History:  reports that she has never smoked. She has never used smokeless tobacco. She reports that she does not drink alcohol or use drugs.    Review of patient's allergies indicates:  No Known Allergies    Medications:   Medications Prior to Admission   Medication Sig Dispense Refill Last Dose    CA CITRATE/MGOX/VIT D3/B6/MIN (CALCIUM CITRATE + D WITH MAG ORAL) Take by mouth.       estradiol (ESTRACE) 1 MG tablet Take 1 tablet (1 mg total) by mouth once daily. 90 tablet 3     levocetirizine (XYZAL) 5 MG tablet Take 1 tablet (5 mg total) by mouth every evening. 30 tablet 0     levothyroxine (SYNTHROID) 75 MCG tablet Take 1 tablet (75 mcg total) by mouth once daily. 90 tablet 3        Objective Findings:    Vital Signs: Per nursing notes.    Physical Exam:  General Appearance: Well  appearing in no acute distress  Head:   Normocephalic, without obvious abnormality  Eyes:    No scleral icterus  Airway: Open  Neck: No restriction in mobility  Lungs: CTA bilaterally in anterior and posterior fields, no wheezes, no crackles.  Heart:  Regular rate and rhythm, S1, S2 normal, no murmurs heard  Abdomen: Soft, non tender, non distended      Labs:  Lab Results   Component Value Date    WBC 5.2 08/06/2020    HGB 12.7 08/06/2020    HCT 39.1 08/06/2020     08/06/2020    CHOL 205 (H) 08/06/2020    TRIG 111 08/06/2020    HDL 71 08/06/2020    ALT 10 08/06/2020    AST 18 08/06/2020     08/06/2020    K 3.9 08/06/2020     08/06/2020    CREATININE 0.68 08/06/2020    BUN 9 08/06/2020    CO2 29 08/06/2020    TSH 2.35 08/06/2020         I have explained the risks and benefits of endoscopy procedures to the patient including but not limited to bleeding, perforation, infection, and death.    Thank you so much for allowing me to participate in the care of Kirti Santos MD

## 2020-09-22 NOTE — PLAN OF CARE
Discharge instructions given. Pt verbalized understanding. No c/o. No distress noted. Pt ambulated off unit with family. Steady gait

## 2020-09-22 NOTE — TRANSFER OF CARE
"Anesthesia Transfer of Care Note    Patient: Kirti Romero    Procedure(s) Performed: Procedure(s) (LRB):  COLONOSCOPY (N/A)    Patient location: GI    Anesthesia Type: general    Transport from OR: Transported from OR on room air with adequate spontaneous ventilation    Post pain: adequate analgesia    Post assessment: no apparent anesthetic complications    Post vital signs: stable    Level of consciousness: sedated    Nausea/Vomiting: no nausea/vomiting    Complications: none    Transfer of care protocol was followed      Last vitals:   Visit Vitals  BP (!) 109/57 (BP Location: Left arm, Patient Position: Lying)   Pulse 81   Temp 36.7 °C (98 °F) (Temporal)   Resp 14   Ht 5' 1" (1.549 m)   Wt 47.6 kg (105 lb)   LMP 06/19/1998   SpO2 99%   Breastfeeding No   BMI 19.84 kg/m²     "

## 2020-09-23 ENCOUNTER — PATIENT MESSAGE (OUTPATIENT)
Dept: INTERNAL MEDICINE | Facility: CLINIC | Age: 60
End: 2020-09-23

## 2020-09-23 ENCOUNTER — OFFICE VISIT (OUTPATIENT)
Dept: DERMATOLOGY | Facility: CLINIC | Age: 60
End: 2020-09-23
Payer: COMMERCIAL

## 2020-09-23 VITALS — TEMPERATURE: 98 F

## 2020-09-23 DIAGNOSIS — L81.4 LENTIGINES: ICD-10-CM

## 2020-09-23 DIAGNOSIS — D18.01 ANGIOMA OF SKIN: ICD-10-CM

## 2020-09-23 DIAGNOSIS — L82.0 INFLAMED SEBORRHEIC KERATOSIS: ICD-10-CM

## 2020-09-23 DIAGNOSIS — L60.3 ONYCHODYSTROPHY: ICD-10-CM

## 2020-09-23 DIAGNOSIS — L82.1 SEBORRHEIC KERATOSIS: ICD-10-CM

## 2020-09-23 DIAGNOSIS — D48.5 NEOPLASM OF UNCERTAIN BEHAVIOR OF SKIN: Primary | ICD-10-CM

## 2020-09-23 PROCEDURE — 88305 TISSUE EXAM BY PATHOLOGIST: ICD-10-PCS | Mod: 26,,, | Performed by: PATHOLOGY

## 2020-09-23 PROCEDURE — 88342 IMHCHEM/IMCYTCHM 1ST ANTB: CPT | Mod: 59 | Performed by: PATHOLOGY

## 2020-09-23 PROCEDURE — 11301 SHAVE SKIN LESION 0.6-1.0 CM: CPT | Mod: S$GLB,,, | Performed by: DERMATOLOGY

## 2020-09-23 PROCEDURE — 99203 PR OFFICE/OUTPT VISIT, NEW, LEVL III, 30-44 MIN: ICD-10-PCS | Mod: 25,S$GLB,, | Performed by: DERMATOLOGY

## 2020-09-23 PROCEDURE — 88341 IMHCHEM/IMCYTCHM EA ADD ANTB: CPT | Performed by: PATHOLOGY

## 2020-09-23 PROCEDURE — 88305 TISSUE EXAM BY PATHOLOGIST: CPT | Mod: 26,,, | Performed by: PATHOLOGY

## 2020-09-23 PROCEDURE — 88342 IMHCHEM/IMCYTCHM 1ST ANTB: CPT | Mod: 26,,, | Performed by: PATHOLOGY

## 2020-09-23 PROCEDURE — 88305 TISSUE EXAM BY PATHOLOGIST: CPT | Performed by: PATHOLOGY

## 2020-09-23 PROCEDURE — 11300 PR SHAV SKIN LES < 0.5 CM TRUNK,ARM,LEG: ICD-10-PCS | Mod: S$GLB,,, | Performed by: DERMATOLOGY

## 2020-09-23 PROCEDURE — 88341 IMHCHEM/IMCYTCHM EA ADD ANTB: CPT | Mod: 26,,, | Performed by: PATHOLOGY

## 2020-09-23 PROCEDURE — 88342 CHG IMMUNOCYTOCHEMISTRY: ICD-10-PCS | Mod: 26,,, | Performed by: PATHOLOGY

## 2020-09-23 PROCEDURE — 88341 PR IHC OR ICC EACH ADD'L SINGLE ANTIBODY  STAINPR: ICD-10-PCS | Mod: 26,,, | Performed by: PATHOLOGY

## 2020-09-23 PROCEDURE — 11301 PR SHAV SKIN LES 0.6-1.0 CM TRUNK,ARM,LEG: ICD-10-PCS | Mod: S$GLB,,, | Performed by: DERMATOLOGY

## 2020-09-23 PROCEDURE — 17110 DESTRUCTION B9 LES UP TO 14: CPT | Mod: 59,S$GLB,, | Performed by: DERMATOLOGY

## 2020-09-23 PROCEDURE — 17110 PR DESTRUCTION BENIGN LESIONS UP TO 14: ICD-10-PCS | Mod: 59,S$GLB,, | Performed by: DERMATOLOGY

## 2020-09-23 PROCEDURE — 11300 SHAVE SKIN LESION 0.5 CM/<: CPT | Mod: S$GLB,,, | Performed by: DERMATOLOGY

## 2020-09-23 PROCEDURE — 99203 OFFICE O/P NEW LOW 30 MIN: CPT | Mod: 25,S$GLB,, | Performed by: DERMATOLOGY

## 2020-09-23 NOTE — PROGRESS NOTES
Subjective:       Patient ID:  Kirti Romero is a 59 y.o. female who presents for   Chief Complaint   Patient presents with    Spot    Nail Problem     This is a high risk patient with a personal history of nonmelanoma skin cancer who is here today to check for the development of new lesions.      Spot - Initial  Affected locations: left arm  Duration: 7 months  Signs / symptoms: redness (raised)  Severity: mild  Timing: constant  Aggravated by: nothing  Treatments tried: tried Edudex in July, tried BID x 3 weeks.  Improvement on treatment: moderate (helped to flatten)    Nail Problem - Initial  Affected locations: left toes  Duration: 19 months  Signs / symptoms: asymptomatic  Severity: mild  Timing: constant  Aggravated by: nothing  Relieving factors/Treatments tried: nothing (had culture 19 months ago, results negative)      Review of Systems   Constitutional: Negative for fever.   Respiratory: Negative for cough and shortness of breath.    Musculoskeletal: Negative for joint swelling and arthralgias.   Skin: Negative for recent sunburn.   Hematologic/Lymphatic: Does not bruise/bleed easily.        Objective:    Physical Exam   Constitutional: She appears well-developed and well-nourished. No distress.   HENT:   Mouth/Throat: Lips normal.    Eyes: Lids are normal.  No conjunctival no injection.   Neurological: She is alert and oriented to person, place, and time. She is not disoriented.   Psychiatric: She has a normal mood and affect.   Skin:   Areas Examined (abnormalities noted in diagram):   Scalp / Hair Palpated and Inspected  Head / Face Inspection Performed  Neck Inspection Performed  Chest / Axilla Inspection Performed  Abdomen Inspection Performed  Genitals / Buttocks / Groin Inspection Performed  Back Inspection Performed  RUE Inspected  LUE Inspection Performed  RLE Inspected  LLE Inspection Performed  Nails and Digits Inspection Performed                  Diagram Legend     Erythematous  scaling macule/papule c/w actinic keratosis       Vascular papule c/w angioma      Pigmented verrucoid papule/plaque c/w seborrheic keratosis      Yellow umbilicated papule c/w sebaceous hyperplasia      Irregularly shaped tan macule c/w lentigo     1-2 mm smooth white papules consistent with Milia      Movable subcutaneous cyst with punctum c/w epidermal inclusion cyst      Subcutaneous movable cyst c/w pilar cyst      Firm pink to brown papule c/w dermatofibroma      Pedunculated fleshy papule(s) c/w skin tag(s)      Evenly pigmented macule c/w junctional nevus     Mildly variegated pigmented, slightly irregular-bordered macule c/w mildly atypical nevus      Flesh colored to evenly pigmented papule c/w intradermal nevus       Pink pearly papule/plaque c/w basal cell carcinoma      Erythematous hyperkeratotic cursted plaque c/w SCC      Surgical scar with no sign of skin cancer recurrence      Open and closed comedones      Inflammatory papules and pustules      Verrucoid papule consistent consistent with wart     Erythematous eczematous patches and plaques     Dystrophic onycholytic nail with subungual debris c/w onychomycosis     Umbilicated papule    Erythematous-base heme-crusted tan verrucoid plaque consistent with inflamed seborrheic keratosis     Erythematous Silvery Scaling Plaque c/w Psoriasis     See annotation      Assessment / Plan:      Pathology Orders:     Normal Orders This Visit    Specimen to Pathology, Dermatology     Comments:    Number of Specimens:->2  ------------------------->-------------------------  Spec 1 Procedure:->Biopsy  Spec 1 Clinical Impression:->r/o dysplastic nevus vs melanoma  Spec 1 Source:->right posterior arm  ------------------------->-------------------------  Spec 2 Procedure:->Biopsy  Spec 2 Clinical Impression:->r/o dysplastic nevus vs melanoma  Spec 2 Source:->right lateral shin    Questions:    Procedure Type: Dermatology and skin neoplasms    Number of Specimens: 2     ------------------------: -------------------------    Spec 1 Procedure: Biopsy    Spec 1 Clinical Impression: r/o dysplastic nevus vs melanoma    Spec 1 Source: right posterior arm    ------------------------: -------------------------    Spec 2 Procedure: Biopsy    Spec 2 Clinical Impression: r/o dysplastic nevus vs melanoma    Spec 2 Source: right lateral shin        Neoplasm of uncertain behavior of skin  -     Specimen to Pathology, Dermatology    Shave removal procedure note:  Risk, benefits, and alternatives of shave removal are discussed with the patient, including risk of infection, scar, recurrence, and need for additional treatment of site. The patient agrees to the procedure by verbal consent. The area is marked and prepped with alcohol.  Approximately 1 mL of lidocaine 1% with epinephrine is used for local anesthesia. A sharp blade is used to remove the entire lesion with a minimal margin of normal-appearing skin. The specimen is sent to pathology for histologic confirmation. Hemostasis is obtained with aluminum chloride and/or monopolar hyfrecation if needed. The area is then dressed and bandaged. The patient tolerated the procedure well without adverse event. Written instructions on wound care were given and were reviewed with the patient, who is to call for any signs of bleeding or infection. The patient will be notified of the pathology results.  Size of lesion: 8 x 6 mm    Shave removal procedure note:  Risk, benefits, and alternatives of shave removal are discussed with the patient, including risk of infection, scar, recurrence, and need for additional treatment of site. The patient agrees to the procedure by verbal consent. The area is marked and prepped with alcohol.  Approximately 1 mL of lidocaine 1% with epinephrine is used for local anesthesia. A sharp blade is used to remove the entire lesion with a minimal margin of normal-appearing skin. The specimen is sent to pathology for histologic  confirmation. Hemostasis is obtained with aluminum chloride and/or monopolar hyfrecation if needed. The area is then dressed and bandaged. The patient tolerated the procedure well without adverse event. Written instructions on wound care were given and were reviewed with the patient, who is to call for any signs of bleeding or infection. The patient will be notified of the pathology results.  Size of lesion: 3 x 1 mm    Inflamed seborrheic keratosis  Cryosurgery procedure note:  Risk, benefits, and alternatives of cryosurgery are discussed with the patient, including but not limited to the risks of hypopigmentation, hyperpigmentation, scar, infection, recurrence of lesion(s), development of new lesion(s), and need for additional treatment of the lesion(s). Verbal consent obtained from patient. Liquid nitrogen cryosurgery applied to 4 lesion(s) to produce a freeze injury. Counseled patient that blisters may form, and instructed patient on wound care with gentle cleansing and use of Vaseline ointment to keep moist until healed. Handout was provided, and patient was instructed to return to clinic in 1-2 months if lesions do not completely resolve.    Lentigines  These are benign sun spots which should be monitored for changes. Daily sun protection will reduce the number of new lesions.   Prescribed tretinoin 0.025% / niacinamide 2% / hyaluronic acid 0.025% cream. Apply a pea-sized amount to entire face qhs.  Patient instructed in importance of daily broad-spectrum sunscreen use with SPF of at least 30. Sun avoidance and topical protection/protective clothing discussed.    Angioma of skin  This is a benign vascular lesion. Reassurance given. No treatment required.    Seborrheic keratosis  These are benign, inherited growths without a malignant potential. Reassurance given to patient. No treatment is necessary. Handout was provided.    Onychodystrophy  Suspect retronychia. Recommended better fitting footwear.      Follow  up in about 1 year (around 9/23/2021) for TBSE, or sooner dependent on pathology results.

## 2020-09-29 LAB
FINAL PATHOLOGIC DIAGNOSIS: NORMAL
GROSS: NORMAL

## 2020-10-08 ENCOUNTER — OFFICE VISIT (OUTPATIENT)
Dept: OBSTETRICS AND GYNECOLOGY | Facility: CLINIC | Age: 60
End: 2020-10-08
Payer: COMMERCIAL

## 2020-10-08 ENCOUNTER — HOSPITAL ENCOUNTER (OUTPATIENT)
Dept: RADIOLOGY | Facility: HOSPITAL | Age: 60
Discharge: HOME | End: 2020-10-08
Attending: OBSTETRICS & GYNECOLOGY
Payer: COMMERCIAL

## 2020-10-08 VITALS
SYSTOLIC BLOOD PRESSURE: 108 MMHG | WEIGHT: 106.88 LBS | DIASTOLIC BLOOD PRESSURE: 66 MMHG | BODY MASS INDEX: 20.18 KG/M2 | HEIGHT: 61 IN

## 2020-10-08 DIAGNOSIS — Z01.419 ROUTINE GYNECOLOGICAL EXAMINATION: ICD-10-CM

## 2020-10-08 DIAGNOSIS — Z01.419 WELL WOMAN EXAM WITH ROUTINE GYNECOLOGICAL EXAM: ICD-10-CM

## 2020-10-08 DIAGNOSIS — Z12.4 ROUTINE PAPANICOLAOU SMEAR: Primary | ICD-10-CM

## 2020-10-08 DIAGNOSIS — Z12.31 ENCOUNTER FOR SCREENING MAMMOGRAM FOR BREAST CANCER: ICD-10-CM

## 2020-10-08 PROCEDURE — 77063 MAMMO DIGITAL SCREENING BILAT WITH TOMOSYNTHESIS_CAD: ICD-10-PCS | Mod: 26,,, | Performed by: RADIOLOGY

## 2020-10-08 PROCEDURE — 77067 SCR MAMMO BI INCL CAD: CPT | Mod: TC

## 2020-10-08 PROCEDURE — 88141 CYTOPATH C/V INTERPRET: CPT | Mod: ,,, | Performed by: PATHOLOGY

## 2020-10-08 PROCEDURE — 99999 PR PBB SHADOW E&M-EST. PATIENT-LVL III: CPT | Mod: PBBFAC,,, | Performed by: OBSTETRICS & GYNECOLOGY

## 2020-10-08 PROCEDURE — 99396 PREV VISIT EST AGE 40-64: CPT | Mod: S$GLB,,, | Performed by: OBSTETRICS & GYNECOLOGY

## 2020-10-08 PROCEDURE — 99999 PR PBB SHADOW E&M-EST. PATIENT-LVL III: ICD-10-PCS | Mod: PBBFAC,,, | Performed by: OBSTETRICS & GYNECOLOGY

## 2020-10-08 PROCEDURE — 3008F PR BODY MASS INDEX (BMI) DOCUMENTED: ICD-10-PCS | Mod: CPTII,S$GLB,, | Performed by: OBSTETRICS & GYNECOLOGY

## 2020-10-08 PROCEDURE — 77067 SCR MAMMO BI INCL CAD: CPT | Mod: 26,,, | Performed by: RADIOLOGY

## 2020-10-08 PROCEDURE — 3008F BODY MASS INDEX DOCD: CPT | Mod: CPTII,S$GLB,, | Performed by: OBSTETRICS & GYNECOLOGY

## 2020-10-08 PROCEDURE — 77067 MAMMO DIGITAL SCREENING BILAT WITH TOMOSYNTHESIS_CAD: ICD-10-PCS | Mod: 26,,, | Performed by: RADIOLOGY

## 2020-10-08 PROCEDURE — 77063 BREAST TOMOSYNTHESIS BI: CPT | Mod: 26,,, | Performed by: RADIOLOGY

## 2020-10-08 PROCEDURE — 99396 PR PREVENTIVE VISIT,EST,40-64: ICD-10-PCS | Mod: S$GLB,,, | Performed by: OBSTETRICS & GYNECOLOGY

## 2020-10-08 PROCEDURE — 88142 CYTOPATH C/V THIN LAYER: CPT | Performed by: PATHOLOGY

## 2020-10-08 PROCEDURE — 88141 PR  CYTOPATH CERV/VAG INTERPRET: ICD-10-PCS | Mod: ,,, | Performed by: PATHOLOGY

## 2020-10-08 NOTE — LETTER
October 8, 2020      Howard Gates MD  [address]  [facility] [address]           [facility]  [address], [address]  [facility] [address]  Phone: [phone]          Patient: Kirti Romero   MR Number: [id number]   YOB: 1960   Date of Visit: 10/8/2020       Dear Dr. Howard Gates:    Thank you for referring Kirti Romero to me for evaluation. Attached you will find relevant portions of my assessment and plan of care.    If you have questions, please do not hesitate to call me. I look forward to following Kirti Romero along with you.    Sincerely,    Michael A. Wiedemann, MD    Enclosure  CC:  No Recipients    If you would like to receive this communication electronically, please contact [email or web address] or [phone] to request more information on [facility] Link access.    For providers and/or their staff who would like to refer a patient to [facility], please contact us through our one-stop-shop provider referral line, [facility], at [phone].    If you feel you have received this communication in error or would no longer like to receive these types of communications, please e-mail [email or web address]

## 2020-10-08 NOTE — PROGRESS NOTES
"HPI:   59 y.o.   OB History        4    Para   4    Term   2            AB        Living   2       SAB        TAB        Ectopic        Multiple        Live Births   2              Patient's last menstrual period was 1998.    Patient is  here for her annual gynecologic exam.  She has no complaints.     ROS:  GENERAL: No fever, chills, fatigability or weight loss.  SKIN: No rashes, itching or changes in color or texture of skin.  HEAD: No headaches or recent head trauma.  EYES: Visual acuity fine. No photophobia, ocular pain or diplopia.  EARS: Denies ear pain, discharge or vertigo.  NOSE: No loss of smell, no epistaxis or postnasal drip.  MOUTH & THROAT: No hoarseness or change in voice. No excessive gum bleeding.  NODES: Denies swollen glands.  CHEST: Denies YOUNG, cyanosis, wheezing, cough and sputum production.  CARDIOVASCULAR: Denies chest pain, PND, orthopnea or reduced exercise tolerance.  ABDOMEN: Appetite fine. No weight loss. Denies diarrhea, abdominal pain, hematemesis or blood in stool.  URINARY: No flank pain, dysuria or hematuria.  PERIPHERAL VASCULAR: No claudication or cyanosis.  MUSCULOSKELETAL: No joint stiffness or swelling. Denies back pain.  NEUROLOGIC: No history of seizures, paralysis, alteration of gait or coordination.    PE:   /66   Ht 5' 1" (1.549 m)   Wt 48.5 kg (106 lb 14.4 oz)   LMP 1998   BMI 20.20 kg/m²   APPEARANCE: Well nourished, well developed, in no acute distress.  NECK: Neck symmetric without masses or thyromegaly.  BREASTS: Symmetrical, no skin changes or visible lesions. No palpable masses, nipple discharge or adenopathy bilaterally.  ABDOMEN: Flat. Soft. No tenderness or masses. No hepatosplenomegaly. No hernias. No CVA tenderness.  VULVA: No lesions. Normal female genitalia.  URETHRAL MEATUS: Normal size and location, no lesions, no prolapse.  URETHRA: No masses, tenderness, prolapse or scarring.  VAGINA: Moist and well rugated, no " discharge, no significant cystocele or rectocele.  CERVIX: No lesions and discharge. PAP done.      ADNEXA: No masses, tenderness or CDS nodularity.  ANUS PERINEUM: Normal.    PROCEDURES:  Pap smear    Assessment:  Normal Gynecologic Exam    Plan:  Mammogram and Colonoscopy if indicated by current recommendations.  Return to clinic in one year or for any problems or complaints.  Doing well on hrt, discussed ri9sks  Pt watns to cont

## 2020-10-09 ENCOUNTER — IMMUNIZATION (OUTPATIENT)
Dept: PHARMACY | Facility: CLINIC | Age: 60
End: 2020-10-09
Payer: COMMERCIAL

## 2020-10-12 ENCOUNTER — PATIENT MESSAGE (OUTPATIENT)
Dept: OBSTETRICS AND GYNECOLOGY | Facility: CLINIC | Age: 60
End: 2020-10-12

## 2020-10-12 RX ORDER — ESTRADIOL 1 MG/1
1 TABLET ORAL DAILY
Qty: 90 TABLET | Refills: 3 | Status: SHIPPED | OUTPATIENT
Start: 2020-10-12 | End: 2021-08-19 | Stop reason: SDUPTHER

## 2020-11-11 ENCOUNTER — PATIENT OUTREACH (OUTPATIENT)
Dept: ADMINISTRATIVE | Facility: OTHER | Age: 60
End: 2020-11-11

## 2020-11-12 ENCOUNTER — OFFICE VISIT (OUTPATIENT)
Dept: OPTOMETRY | Facility: CLINIC | Age: 60
End: 2020-11-12
Payer: COMMERCIAL

## 2020-11-12 DIAGNOSIS — H52.4 PRESBYOPIA OF BOTH EYES: ICD-10-CM

## 2020-11-12 DIAGNOSIS — H52.13 MYOPIA OF BOTH EYES WITH ASTIGMATISM: ICD-10-CM

## 2020-11-12 DIAGNOSIS — Z13.5 SCREENING FOR EYE CONDITION: ICD-10-CM

## 2020-11-12 DIAGNOSIS — H25.13 NUCLEAR SCLEROSIS OF BOTH EYES: Primary | ICD-10-CM

## 2020-11-12 DIAGNOSIS — H52.203 MYOPIA OF BOTH EYES WITH ASTIGMATISM: ICD-10-CM

## 2020-11-12 PROCEDURE — 99999 PR PBB SHADOW E&M-EST. PATIENT-LVL III: CPT | Mod: PBBFAC,,, | Performed by: OPTOMETRIST

## 2020-11-12 PROCEDURE — 92015 DETERMINE REFRACTIVE STATE: CPT | Mod: S$GLB,,, | Performed by: OPTOMETRIST

## 2020-11-12 PROCEDURE — 99999 PR PBB SHADOW E&M-EST. PATIENT-LVL III: ICD-10-PCS | Mod: PBBFAC,,, | Performed by: OPTOMETRIST

## 2020-11-12 PROCEDURE — 92014 COMPRE OPH EXAM EST PT 1/>: CPT | Mod: S$GLB,,, | Performed by: OPTOMETRIST

## 2020-11-12 PROCEDURE — 92014 PR EYE EXAM, EST PATIENT,COMPREHESV: ICD-10-PCS | Mod: S$GLB,,, | Performed by: OPTOMETRIST

## 2020-11-12 PROCEDURE — 92015 PR REFRACTION: ICD-10-PCS | Mod: S$GLB,,, | Performed by: OPTOMETRIST

## 2020-11-12 NOTE — PATIENT INSTRUCTIONS
Trace nuclear sclerosis of lens of both eyes, consistent with age.  Slight asymmetry in optic nerve head cupping, greater in the right eye.  Noted previously.  Cup-to-disc ratio appears stable in each eye, and intraocular pressure remains within normal range in each eye.  Monitor.    Good ocular health in both eyes, otherwise.  Myopia with astigmatism in each eye.  Good best correctable VA in each eye, slightly better in the right eye than in the left eye, as noted on previous examination  Presbyopia consistent with age.  New spectacle lens Rx issued for use as desired.     Recheck in one year - or prior, if any problems in the interim.

## 2020-11-12 NOTE — PROGRESS NOTES
"HPI     eye examination       Additional comments: Annual general eye examination and refraction.  No acute ocular/vision problems               Comments     Patient's age: 60 y.o. WF  Approximate date of last eye examination:  11/04/2019   Name of last eye doctor seen: Dr. Garcia  Wears glasses? yes     If yes, wears  Full-time or part-time?  Full-time  Present glasses are: Bifocal, SV Distance, SV Reading?  Progressive lenses  Approximate age of present glasses:  1 year  Got new glasses following last exam, or subsequently?:  yes   Any problem with VA with glasses?  No  Wears CLs?:  no  Headaches?  no  Eye pain/discomfort?  no                                                                                     Flashes?  no  Floaters?  no  Diplopia/Double vision?  no  Patient's Ocular History:         Any eye surgeries? No          Any eye injury?  No          Any treatment for eye disease?  No  Family history of eye disease?  No   Significant patient medical history:         1. Diabetes?  no       If yes, IDDM or NIDDM? n/a   2. HBP?  no              3. Other (describe):  No   ! OTC eyedrops currently using:  No    ! Prescription eye meds currently using:  no   ! Any history of allergy/adverse reaction to any eye meds used   previously?  no    ! Any history of allergy/adverse reaction to eyedrops used during prior   eye exam(s)? no    ! Any history of allergy/adverse reaction to Novacaine or similar meds?   no   ! Any history of allergy/adverse reaction to Epinephrine or similar meds?   no    ! Patient okay with use of anesthetic eyedrops to check eye pressure?    yes        ! Patient okay with use of eyedrops to dilate pupils today?  Yes   !  Allergies/Medications/Medical History/Family History reviewed today?    yes      PD =   61/57  Desired reading distance =  12" (use 13")                                                                       Last edited by Santo Garcia, OD on 11/12/2020  8:33 AM. " (History)            Assessment /Plan     For exam results, see Encounter Report.    1. Nuclear sclerosis of both eyes     2. Myopia of both eyes with astigmatism     3. Presbyopia of both eyes     4. Screening for eye condition                  Trace nuclear sclerosis of lens of both eyes, consistent with age.  Slight asymmetry in optic nerve head cupping, greater in the right eye.  Noted previously.  Cup-to-disc ratio appears stable in each eye, and intraocular pressure remains within normal range in each eye.  Monitor.    Good ocular health in both eyes, otherwise.  Myopia with astigmatism in each eye.  Good best correctable VA in each eye, slightly better in the right eye than in the left eye, as noted on previous examination  Presbyopia consistent with age.  New spectacle lens Rx issued for use as desired.     Recheck in one year - or prior, if any problems in the interim.

## 2020-11-16 ENCOUNTER — TELEPHONE (OUTPATIENT)
Dept: OBSTETRICS AND GYNECOLOGY | Facility: CLINIC | Age: 60
End: 2020-11-16

## 2020-11-23 ENCOUNTER — IMMUNIZATION (OUTPATIENT)
Dept: PHARMACY | Facility: CLINIC | Age: 60
End: 2020-11-23
Payer: COMMERCIAL

## 2020-12-07 ENCOUNTER — PATIENT MESSAGE (OUTPATIENT)
Dept: OBSTETRICS AND GYNECOLOGY | Facility: CLINIC | Age: 60
End: 2020-12-07

## 2020-12-10 ENCOUNTER — PATIENT MESSAGE (OUTPATIENT)
Dept: OBSTETRICS AND GYNECOLOGY | Facility: CLINIC | Age: 60
End: 2020-12-10

## 2021-03-29 ENCOUNTER — IMMUNIZATION (OUTPATIENT)
Dept: PHARMACY | Facility: CLINIC | Age: 61
End: 2021-03-29
Payer: COMMERCIAL

## 2021-03-29 DIAGNOSIS — Z23 NEED FOR VACCINATION: Primary | ICD-10-CM

## 2021-04-26 ENCOUNTER — IMMUNIZATION (OUTPATIENT)
Dept: PHARMACY | Facility: CLINIC | Age: 61
End: 2021-04-26
Payer: COMMERCIAL

## 2021-04-26 DIAGNOSIS — Z23 NEED FOR VACCINATION: Primary | ICD-10-CM

## 2021-07-23 ENCOUNTER — PATIENT MESSAGE (OUTPATIENT)
Dept: OBSTETRICS AND GYNECOLOGY | Facility: CLINIC | Age: 61
End: 2021-07-23

## 2021-07-23 DIAGNOSIS — Z12.39 ENCOUNTER FOR SCREENING FOR MALIGNANT NEOPLASM OF BREAST, UNSPECIFIED SCREENING MODALITY: Primary | ICD-10-CM

## 2021-08-02 ENCOUNTER — PATIENT MESSAGE (OUTPATIENT)
Dept: INTERNAL MEDICINE | Facility: CLINIC | Age: 61
End: 2021-08-02

## 2021-08-02 DIAGNOSIS — Z00.00 ROUTINE PHYSICAL EXAMINATION: Primary | ICD-10-CM

## 2021-08-02 DIAGNOSIS — E03.9 HYPOTHYROIDISM, UNSPECIFIED TYPE: ICD-10-CM

## 2021-08-02 DIAGNOSIS — E55.9 VITAMIN D DEFICIENCY: ICD-10-CM

## 2021-08-02 DIAGNOSIS — R73.09 ELEVATED GLUCOSE LEVEL: ICD-10-CM

## 2021-08-02 DIAGNOSIS — E53.8 B12 DEFICIENCY: ICD-10-CM

## 2021-08-05 ENCOUNTER — PATIENT OUTREACH (OUTPATIENT)
Dept: ADMINISTRATIVE | Facility: HOSPITAL | Age: 61
End: 2021-08-05

## 2021-08-12 LAB
25(OH)D3 SERPL-MCNC: 84 NG/ML (ref 30–100)
ALBUMIN SERPL-MCNC: 4.5 G/DL (ref 3.6–5.1)
ALBUMIN/GLOB SERPL: 2 (CALC) (ref 1–2.5)
ALP SERPL-CCNC: 62 U/L (ref 37–153)
ALT SERPL-CCNC: 15 U/L (ref 6–29)
AST SERPL-CCNC: 19 U/L (ref 10–35)
BASOPHILS # BLD AUTO: 61 CELLS/UL (ref 0–200)
BASOPHILS NFR BLD AUTO: 1.2 %
BILIRUB SERPL-MCNC: 0.5 MG/DL (ref 0.2–1.2)
BUN SERPL-MCNC: 17 MG/DL (ref 7–25)
BUN/CREAT SERPL: NORMAL (CALC) (ref 6–22)
CALCIUM SERPL-MCNC: 9.4 MG/DL (ref 8.6–10.4)
CHLORIDE SERPL-SCNC: 103 MMOL/L (ref 98–110)
CHOLEST SERPL-MCNC: 211 MG/DL
CHOLEST/HDLC SERPL: 2.4 (CALC)
CO2 SERPL-SCNC: 30 MMOL/L (ref 20–32)
CREAT SERPL-MCNC: 0.79 MG/DL (ref 0.5–0.99)
EOSINOPHIL # BLD AUTO: 209 CELLS/UL (ref 15–500)
EOSINOPHIL NFR BLD AUTO: 4.1 %
ERYTHROCYTE [DISTWIDTH] IN BLOOD BY AUTOMATED COUNT: 12.7 % (ref 11–15)
GLOBULIN SER CALC-MCNC: 2.3 G/DL (CALC) (ref 1.9–3.7)
GLUCOSE SERPL-MCNC: 77 MG/DL (ref 65–99)
HBA1C MFR BLD: 5.1 % OF TOTAL HGB
HCT VFR BLD AUTO: 41.5 % (ref 35–45)
HDLC SERPL-MCNC: 87 MG/DL
HGB BLD-MCNC: 13.2 G/DL (ref 11.7–15.5)
LDLC SERPL CALC-MCNC: 107 MG/DL (CALC)
LYMPHOCYTES # BLD AUTO: 2601 CELLS/UL (ref 850–3900)
LYMPHOCYTES NFR BLD AUTO: 51 %
MCH RBC QN AUTO: 30.6 PG (ref 27–33)
MCHC RBC AUTO-ENTMCNC: 31.8 G/DL (ref 32–36)
MCV RBC AUTO: 96.3 FL (ref 80–100)
MONOCYTES # BLD AUTO: 541 CELLS/UL (ref 200–950)
MONOCYTES NFR BLD AUTO: 10.6 %
NEUTROPHILS # BLD AUTO: 1688 CELLS/UL (ref 1500–7800)
NEUTROPHILS NFR BLD AUTO: 33.1 %
NONHDLC SERPL-MCNC: 124 MG/DL (CALC)
PLATELET # BLD AUTO: 210 THOUSAND/UL (ref 140–400)
PMV BLD REES-ECKER: 11.4 FL (ref 7.5–12.5)
POTASSIUM SERPL-SCNC: 3.9 MMOL/L (ref 3.5–5.3)
PROT SERPL-MCNC: 6.8 G/DL (ref 6.1–8.1)
RBC # BLD AUTO: 4.31 MILLION/UL (ref 3.8–5.1)
SODIUM SERPL-SCNC: 142 MMOL/L (ref 135–146)
TRIGL SERPL-MCNC: 80 MG/DL
TSH SERPL-ACNC: 2.74 MIU/L (ref 0.4–4.5)
VIT B12 SERPL-MCNC: >2000 PG/ML (ref 200–1100)
WBC # BLD AUTO: 5.1 THOUSAND/UL (ref 3.8–10.8)

## 2021-08-19 ENCOUNTER — OFFICE VISIT (OUTPATIENT)
Dept: INTERNAL MEDICINE | Facility: CLINIC | Age: 61
End: 2021-08-19
Payer: COMMERCIAL

## 2021-08-19 VITALS
BODY MASS INDEX: 19.86 KG/M2 | WEIGHT: 105.19 LBS | SYSTOLIC BLOOD PRESSURE: 116 MMHG | HEART RATE: 76 BPM | OXYGEN SATURATION: 99 % | HEIGHT: 61 IN | DIASTOLIC BLOOD PRESSURE: 72 MMHG

## 2021-08-19 DIAGNOSIS — G47.00 INSOMNIA, UNSPECIFIED TYPE: ICD-10-CM

## 2021-08-19 DIAGNOSIS — E53.8 B12 DEFICIENCY: ICD-10-CM

## 2021-08-19 DIAGNOSIS — E03.9 HYPOTHYROIDISM, UNSPECIFIED TYPE: ICD-10-CM

## 2021-08-19 DIAGNOSIS — R73.09 ELEVATED GLUCOSE LEVEL: ICD-10-CM

## 2021-08-19 DIAGNOSIS — Z00.00 ROUTINE PHYSICAL EXAMINATION: Primary | ICD-10-CM

## 2021-08-19 PROBLEM — R19.5 POSITIVE FIT (FECAL IMMUNOCHEMICAL TEST): Status: RESOLVED | Noted: 2020-09-22 | Resolved: 2021-08-19

## 2021-08-19 PROCEDURE — 3008F PR BODY MASS INDEX (BMI) DOCUMENTED: ICD-10-PCS | Mod: CPTII,S$GLB,, | Performed by: INTERNAL MEDICINE

## 2021-08-19 PROCEDURE — 1126F PR PAIN SEVERITY QUANTIFIED, NO PAIN PRESENT: ICD-10-PCS | Mod: CPTII,S$GLB,, | Performed by: INTERNAL MEDICINE

## 2021-08-19 PROCEDURE — 1159F MED LIST DOCD IN RCRD: CPT | Mod: CPTII,S$GLB,, | Performed by: INTERNAL MEDICINE

## 2021-08-19 PROCEDURE — 3008F BODY MASS INDEX DOCD: CPT | Mod: CPTII,S$GLB,, | Performed by: INTERNAL MEDICINE

## 2021-08-19 PROCEDURE — 99396 PR PREVENTIVE VISIT,EST,40-64: ICD-10-PCS | Mod: S$GLB,,, | Performed by: INTERNAL MEDICINE

## 2021-08-19 PROCEDURE — 3044F PR MOST RECENT HEMOGLOBIN A1C LEVEL <7.0%: ICD-10-PCS | Mod: CPTII,S$GLB,, | Performed by: INTERNAL MEDICINE

## 2021-08-19 PROCEDURE — 3078F PR MOST RECENT DIASTOLIC BLOOD PRESSURE < 80 MM HG: ICD-10-PCS | Mod: CPTII,S$GLB,, | Performed by: INTERNAL MEDICINE

## 2021-08-19 PROCEDURE — 99999 PR PBB SHADOW E&M-EST. PATIENT-LVL IV: ICD-10-PCS | Mod: PBBFAC,,, | Performed by: INTERNAL MEDICINE

## 2021-08-19 PROCEDURE — 1159F PR MEDICATION LIST DOCUMENTED IN MEDICAL RECORD: ICD-10-PCS | Mod: CPTII,S$GLB,, | Performed by: INTERNAL MEDICINE

## 2021-08-19 PROCEDURE — 3074F SYST BP LT 130 MM HG: CPT | Mod: CPTII,S$GLB,, | Performed by: INTERNAL MEDICINE

## 2021-08-19 PROCEDURE — 1160F RVW MEDS BY RX/DR IN RCRD: CPT | Mod: CPTII,S$GLB,, | Performed by: INTERNAL MEDICINE

## 2021-08-19 PROCEDURE — 1160F PR REVIEW ALL MEDS BY PRESCRIBER/CLIN PHARMACIST DOCUMENTED: ICD-10-PCS | Mod: CPTII,S$GLB,, | Performed by: INTERNAL MEDICINE

## 2021-08-19 PROCEDURE — 3044F HG A1C LEVEL LT 7.0%: CPT | Mod: CPTII,S$GLB,, | Performed by: INTERNAL MEDICINE

## 2021-08-19 PROCEDURE — 99999 PR PBB SHADOW E&M-EST. PATIENT-LVL IV: CPT | Mod: PBBFAC,,, | Performed by: INTERNAL MEDICINE

## 2021-08-19 PROCEDURE — 99396 PREV VISIT EST AGE 40-64: CPT | Mod: S$GLB,,, | Performed by: INTERNAL MEDICINE

## 2021-08-19 PROCEDURE — 3074F PR MOST RECENT SYSTOLIC BLOOD PRESSURE < 130 MM HG: ICD-10-PCS | Mod: CPTII,S$GLB,, | Performed by: INTERNAL MEDICINE

## 2021-08-19 PROCEDURE — 1126F AMNT PAIN NOTED NONE PRSNT: CPT | Mod: CPTII,S$GLB,, | Performed by: INTERNAL MEDICINE

## 2021-08-19 PROCEDURE — 3078F DIAST BP <80 MM HG: CPT | Mod: CPTII,S$GLB,, | Performed by: INTERNAL MEDICINE

## 2021-08-19 RX ORDER — HYDROXYZINE HYDROCHLORIDE 25 MG/1
TABLET, FILM COATED ORAL
Qty: 10 TABLET | Refills: 3 | Status: SHIPPED | OUTPATIENT
Start: 2021-08-19 | End: 2022-08-30 | Stop reason: SDUPTHER

## 2021-08-19 RX ORDER — ESTRADIOL 1 MG/1
1 TABLET ORAL DAILY
Qty: 90 TABLET | Refills: 3 | Status: SHIPPED | OUTPATIENT
Start: 2021-08-19 | End: 2022-08-30 | Stop reason: SDUPTHER

## 2021-08-19 RX ORDER — LEVOTHYROXINE SODIUM 75 UG/1
75 TABLET ORAL DAILY
Qty: 90 TABLET | Refills: 3 | Status: SHIPPED | OUTPATIENT
Start: 2021-08-19 | End: 2022-08-30 | Stop reason: SDUPTHER

## 2021-10-12 ENCOUNTER — HOSPITAL ENCOUNTER (OUTPATIENT)
Dept: RADIOLOGY | Facility: HOSPITAL | Age: 61
Discharge: HOME | End: 2021-10-12
Attending: NURSE PRACTITIONER
Payer: COMMERCIAL

## 2021-10-12 ENCOUNTER — OFFICE VISIT (OUTPATIENT)
Dept: OBSTETRICS AND GYNECOLOGY | Facility: CLINIC | Age: 61
End: 2021-10-12
Payer: COMMERCIAL

## 2021-10-12 VITALS
SYSTOLIC BLOOD PRESSURE: 118 MMHG | WEIGHT: 106.81 LBS | DIASTOLIC BLOOD PRESSURE: 68 MMHG | BODY MASS INDEX: 20.18 KG/M2

## 2021-10-12 DIAGNOSIS — Z01.419 WELL WOMAN EXAM WITH ROUTINE GYNECOLOGICAL EXAM: Primary | ICD-10-CM

## 2021-10-12 DIAGNOSIS — Z12.39 ENCOUNTER FOR SCREENING FOR MALIGNANT NEOPLASM OF BREAST, UNSPECIFIED SCREENING MODALITY: ICD-10-CM

## 2021-10-12 PROCEDURE — 77067 SCR MAMMO BI INCL CAD: CPT | Mod: TC

## 2021-10-12 PROCEDURE — 99396 PREV VISIT EST AGE 40-64: CPT | Mod: S$GLB,,, | Performed by: OBSTETRICS & GYNECOLOGY

## 2021-10-12 PROCEDURE — 3074F SYST BP LT 130 MM HG: CPT | Mod: CPTII,S$GLB,, | Performed by: OBSTETRICS & GYNECOLOGY

## 2021-10-12 PROCEDURE — 3008F PR BODY MASS INDEX (BMI) DOCUMENTED: ICD-10-PCS | Mod: CPTII,S$GLB,, | Performed by: OBSTETRICS & GYNECOLOGY

## 2021-10-12 PROCEDURE — 77067 SCR MAMMO BI INCL CAD: CPT | Mod: 26,,, | Performed by: RADIOLOGY

## 2021-10-12 PROCEDURE — 77067 MAMMO DIGITAL SCREENING BILAT WITH TOMO: ICD-10-PCS | Mod: 26,,, | Performed by: RADIOLOGY

## 2021-10-12 PROCEDURE — 88175 CYTOPATH C/V AUTO FLUID REDO: CPT | Performed by: OBSTETRICS & GYNECOLOGY

## 2021-10-12 PROCEDURE — 99999 PR PBB SHADOW E&M-EST. PATIENT-LVL III: CPT | Mod: PBBFAC,,, | Performed by: OBSTETRICS & GYNECOLOGY

## 2021-10-12 PROCEDURE — 3044F HG A1C LEVEL LT 7.0%: CPT | Mod: CPTII,S$GLB,, | Performed by: OBSTETRICS & GYNECOLOGY

## 2021-10-12 PROCEDURE — 77063 MAMMO DIGITAL SCREENING BILAT WITH TOMO: ICD-10-PCS | Mod: 26,,, | Performed by: RADIOLOGY

## 2021-10-12 PROCEDURE — 1160F RVW MEDS BY RX/DR IN RCRD: CPT | Mod: CPTII,S$GLB,, | Performed by: OBSTETRICS & GYNECOLOGY

## 2021-10-12 PROCEDURE — 3078F PR MOST RECENT DIASTOLIC BLOOD PRESSURE < 80 MM HG: ICD-10-PCS | Mod: CPTII,S$GLB,, | Performed by: OBSTETRICS & GYNECOLOGY

## 2021-10-12 PROCEDURE — 3008F BODY MASS INDEX DOCD: CPT | Mod: CPTII,S$GLB,, | Performed by: OBSTETRICS & GYNECOLOGY

## 2021-10-12 PROCEDURE — 77063 BREAST TOMOSYNTHESIS BI: CPT | Mod: 26,,, | Performed by: RADIOLOGY

## 2021-10-12 PROCEDURE — 3044F PR MOST RECENT HEMOGLOBIN A1C LEVEL <7.0%: ICD-10-PCS | Mod: CPTII,S$GLB,, | Performed by: OBSTETRICS & GYNECOLOGY

## 2021-10-12 PROCEDURE — 99999 PR PBB SHADOW E&M-EST. PATIENT-LVL III: ICD-10-PCS | Mod: PBBFAC,,, | Performed by: OBSTETRICS & GYNECOLOGY

## 2021-10-12 PROCEDURE — 1159F MED LIST DOCD IN RCRD: CPT | Mod: CPTII,S$GLB,, | Performed by: OBSTETRICS & GYNECOLOGY

## 2021-10-12 PROCEDURE — 99396 PR PREVENTIVE VISIT,EST,40-64: ICD-10-PCS | Mod: S$GLB,,, | Performed by: OBSTETRICS & GYNECOLOGY

## 2021-10-12 PROCEDURE — 1159F PR MEDICATION LIST DOCUMENTED IN MEDICAL RECORD: ICD-10-PCS | Mod: CPTII,S$GLB,, | Performed by: OBSTETRICS & GYNECOLOGY

## 2021-10-12 PROCEDURE — 3074F PR MOST RECENT SYSTOLIC BLOOD PRESSURE < 130 MM HG: ICD-10-PCS | Mod: CPTII,S$GLB,, | Performed by: OBSTETRICS & GYNECOLOGY

## 2021-10-12 PROCEDURE — 1160F PR REVIEW ALL MEDS BY PRESCRIBER/CLIN PHARMACIST DOCUMENTED: ICD-10-PCS | Mod: CPTII,S$GLB,, | Performed by: OBSTETRICS & GYNECOLOGY

## 2021-10-12 PROCEDURE — 3078F DIAST BP <80 MM HG: CPT | Mod: CPTII,S$GLB,, | Performed by: OBSTETRICS & GYNECOLOGY

## 2021-10-20 LAB
CYTOLOGIST CVX/VAG CYTO: NORMAL
CYTOLOGY CVX/VAG DOC CYTO: NORMAL
CYTOLOGY CVX/VAG DOC THIN PREP: NORMAL
CYTOLOGY THINPREP PAP COMMENT: NORMAL
CYTOLOGY THINPREP PAP COMMENT: NORMAL
PAP NOTE: NORMAL
PATHOLOGIST CVX/VAG CYTO: NORMAL
STAT OF ADQ CVX/VAG CYTO-IMP: NORMAL

## 2021-11-22 ENCOUNTER — PATIENT OUTREACH (OUTPATIENT)
Dept: ADMINISTRATIVE | Facility: OTHER | Age: 61
End: 2021-11-22
Payer: COMMERCIAL

## 2021-11-23 ENCOUNTER — OFFICE VISIT (OUTPATIENT)
Dept: OPTOMETRY | Facility: CLINIC | Age: 61
End: 2021-11-23
Payer: COMMERCIAL

## 2021-11-23 DIAGNOSIS — H25.13 NUCLEAR SCLEROSIS OF BOTH EYES: Primary | ICD-10-CM

## 2021-11-23 DIAGNOSIS — H52.13 MYOPIA OF BOTH EYES WITH ASTIGMATISM: ICD-10-CM

## 2021-11-23 DIAGNOSIS — H52.203 MYOPIA OF BOTH EYES WITH ASTIGMATISM: ICD-10-CM

## 2021-11-23 DIAGNOSIS — Z13.5 SCREENING FOR EYE CONDITION: ICD-10-CM

## 2021-11-23 DIAGNOSIS — H43.822 VITREOMACULAR ADHESION OF LEFT EYE: ICD-10-CM

## 2021-11-23 DIAGNOSIS — H52.4 PRESBYOPIA OF BOTH EYES: ICD-10-CM

## 2021-11-23 DIAGNOSIS — H43.811 POSTERIOR VITREOUS DETACHMENT OF RIGHT EYE: ICD-10-CM

## 2021-11-23 PROCEDURE — 99999 PR PBB SHADOW E&M-EST. PATIENT-LVL III: CPT | Mod: PBBFAC,,, | Performed by: OPTOMETRIST

## 2021-11-23 PROCEDURE — 99999 PR PBB SHADOW E&M-EST. PATIENT-LVL III: ICD-10-PCS | Mod: PBBFAC,,, | Performed by: OPTOMETRIST

## 2021-11-23 PROCEDURE — 92014 PR EYE EXAM, EST PATIENT,COMPREHESV: ICD-10-PCS | Mod: S$GLB,,, | Performed by: OPTOMETRIST

## 2021-11-23 PROCEDURE — 92134 CPTRZ OPH DX IMG PST SGM RTA: CPT | Mod: S$GLB,,, | Performed by: OPTOMETRIST

## 2021-11-23 PROCEDURE — 92014 COMPRE OPH EXAM EST PT 1/>: CPT | Mod: S$GLB,,, | Performed by: OPTOMETRIST

## 2021-11-23 PROCEDURE — 92134 POSTERIOR SEGMENT OCT RETINA (OCULAR COHERENCE TOMOGRAPHY)-BOTH EYES: ICD-10-PCS | Mod: S$GLB,,, | Performed by: OPTOMETRIST

## 2021-11-23 PROCEDURE — 92015 PR REFRACTION: ICD-10-PCS | Mod: S$GLB,,, | Performed by: OPTOMETRIST

## 2021-11-23 PROCEDURE — 92015 DETERMINE REFRACTIVE STATE: CPT | Mod: S$GLB,,, | Performed by: OPTOMETRIST

## 2021-12-11 ENCOUNTER — TELEPHONE (OUTPATIENT)
Dept: ADMINISTRATIVE | Facility: OTHER | Age: 61
End: 2021-12-11
Payer: COMMERCIAL

## 2021-12-15 ENCOUNTER — IMMUNIZATION (OUTPATIENT)
Dept: INTERNAL MEDICINE | Facility: CLINIC | Age: 61
End: 2021-12-15
Payer: COMMERCIAL

## 2021-12-15 DIAGNOSIS — Z23 NEED FOR VACCINATION: Primary | ICD-10-CM

## 2021-12-15 PROCEDURE — 0064A COVID-19, MRNA, LNP-S, PF, 100 MCG/0.25 ML DOSE VACCINE (MODERNA BOOSTER): CPT | Mod: CV19,PBBFAC | Performed by: INTERNAL MEDICINE

## 2022-08-04 ENCOUNTER — PATIENT MESSAGE (OUTPATIENT)
Dept: INTERNAL MEDICINE | Facility: CLINIC | Age: 62
End: 2022-08-04
Payer: COMMERCIAL

## 2022-08-04 ENCOUNTER — IMMUNIZATION (OUTPATIENT)
Dept: PHARMACY | Facility: CLINIC | Age: 62
End: 2022-08-04
Payer: COMMERCIAL

## 2022-08-04 DIAGNOSIS — E53.8 B12 DEFICIENCY: ICD-10-CM

## 2022-08-04 DIAGNOSIS — R73.09 ELEVATED GLUCOSE LEVEL: ICD-10-CM

## 2022-08-04 DIAGNOSIS — Z23 NEED FOR VACCINATION: Primary | ICD-10-CM

## 2022-08-04 DIAGNOSIS — Z00.00 ROUTINE PHYSICAL EXAMINATION: Primary | ICD-10-CM

## 2022-08-04 DIAGNOSIS — G47.00 INSOMNIA, UNSPECIFIED TYPE: ICD-10-CM

## 2022-08-04 DIAGNOSIS — E03.9 HYPOTHYROIDISM, UNSPECIFIED TYPE: ICD-10-CM

## 2022-08-10 LAB
ALBUMIN SERPL-MCNC: 4.2 G/DL (ref 3.6–5.1)
ALBUMIN/GLOB SERPL: 1.8 (CALC) (ref 1–2.5)
ALP SERPL-CCNC: 57 U/L (ref 37–153)
ALT SERPL-CCNC: 14 U/L (ref 6–29)
AST SERPL-CCNC: 21 U/L (ref 10–35)
BASOPHILS # BLD AUTO: 50 CELLS/UL (ref 0–200)
BASOPHILS NFR BLD AUTO: 1 %
BILIRUB SERPL-MCNC: 0.4 MG/DL (ref 0.2–1.2)
BUN SERPL-MCNC: 13 MG/DL (ref 7–25)
BUN/CREAT SERPL: NORMAL (CALC) (ref 6–22)
CALCIUM SERPL-MCNC: 9.4 MG/DL (ref 8.6–10.4)
CHLORIDE SERPL-SCNC: 107 MMOL/L (ref 98–110)
CHOLEST SERPL-MCNC: 210 MG/DL
CHOLEST/HDLC SERPL: 2.4 (CALC)
CO2 SERPL-SCNC: 29 MMOL/L (ref 20–32)
CREAT SERPL-MCNC: 0.71 MG/DL (ref 0.5–1.05)
EGFR: 97 ML/MIN/1.73M2
EOSINOPHIL # BLD AUTO: 170 CELLS/UL (ref 15–500)
EOSINOPHIL NFR BLD AUTO: 3.4 %
ERYTHROCYTE [DISTWIDTH] IN BLOOD BY AUTOMATED COUNT: 12.5 % (ref 11–15)
GLOBULIN SER CALC-MCNC: 2.4 G/DL (CALC) (ref 1.9–3.7)
GLUCOSE SERPL-MCNC: 78 MG/DL (ref 65–99)
HBA1C MFR BLD: 5 % OF TOTAL HGB
HCT VFR BLD AUTO: 39.4 % (ref 35–45)
HDLC SERPL-MCNC: 86 MG/DL
HGB BLD-MCNC: 12.5 G/DL (ref 11.7–15.5)
LDLC SERPL CALC-MCNC: 102 MG/DL (CALC)
LYMPHOCYTES # BLD AUTO: 2160 CELLS/UL (ref 850–3900)
LYMPHOCYTES NFR BLD AUTO: 43.2 %
MCH RBC QN AUTO: 31.3 PG (ref 27–33)
MCHC RBC AUTO-ENTMCNC: 31.7 G/DL (ref 32–36)
MCV RBC AUTO: 98.7 FL (ref 80–100)
MONOCYTES # BLD AUTO: 420 CELLS/UL (ref 200–950)
MONOCYTES NFR BLD AUTO: 8.4 %
NEUTROPHILS # BLD AUTO: 2200 CELLS/UL (ref 1500–7800)
NEUTROPHILS NFR BLD AUTO: 44 %
NONHDLC SERPL-MCNC: 124 MG/DL (CALC)
PLATELET # BLD AUTO: 198 THOUSAND/UL (ref 140–400)
PMV BLD REES-ECKER: 11.2 FL (ref 7.5–12.5)
POTASSIUM SERPL-SCNC: 4.2 MMOL/L (ref 3.5–5.3)
PROT SERPL-MCNC: 6.6 G/DL (ref 6.1–8.1)
RBC # BLD AUTO: 3.99 MILLION/UL (ref 3.8–5.1)
SODIUM SERPL-SCNC: 144 MMOL/L (ref 135–146)
TRIGL SERPL-MCNC: 122 MG/DL
TSH SERPL-ACNC: 5.98 MIU/L (ref 0.4–4.5)
VIT B12 SERPL-MCNC: 622 PG/ML (ref 200–1100)
WBC # BLD AUTO: 5 THOUSAND/UL (ref 3.8–10.8)

## 2022-08-30 ENCOUNTER — OFFICE VISIT (OUTPATIENT)
Dept: INTERNAL MEDICINE | Facility: CLINIC | Age: 62
End: 2022-08-30
Payer: COMMERCIAL

## 2022-08-30 VITALS
HEART RATE: 70 BPM | SYSTOLIC BLOOD PRESSURE: 105 MMHG | BODY MASS INDEX: 20.08 KG/M2 | WEIGHT: 106.25 LBS | DIASTOLIC BLOOD PRESSURE: 70 MMHG

## 2022-08-30 DIAGNOSIS — Z00.00 ROUTINE PHYSICAL EXAMINATION: Primary | ICD-10-CM

## 2022-08-30 DIAGNOSIS — E55.9 VITAMIN D DEFICIENCY: ICD-10-CM

## 2022-08-30 DIAGNOSIS — E53.8 B12 DEFICIENCY: ICD-10-CM

## 2022-08-30 DIAGNOSIS — M21.619 BUNION OF GREAT TOE: ICD-10-CM

## 2022-08-30 DIAGNOSIS — Z12.31 ENCOUNTER FOR SCREENING MAMMOGRAM FOR MALIGNANT NEOPLASM OF BREAST: ICD-10-CM

## 2022-08-30 PROCEDURE — 1159F PR MEDICATION LIST DOCUMENTED IN MEDICAL RECORD: ICD-10-PCS | Mod: CPTII,S$GLB,, | Performed by: INTERNAL MEDICINE

## 2022-08-30 PROCEDURE — 3078F PR MOST RECENT DIASTOLIC BLOOD PRESSURE < 80 MM HG: ICD-10-PCS | Mod: CPTII,S$GLB,, | Performed by: INTERNAL MEDICINE

## 2022-08-30 PROCEDURE — 99396 PR PREVENTIVE VISIT,EST,40-64: ICD-10-PCS | Mod: S$GLB,,, | Performed by: INTERNAL MEDICINE

## 2022-08-30 PROCEDURE — 1160F PR REVIEW ALL MEDS BY PRESCRIBER/CLIN PHARMACIST DOCUMENTED: ICD-10-PCS | Mod: CPTII,S$GLB,, | Performed by: INTERNAL MEDICINE

## 2022-08-30 PROCEDURE — 3044F HG A1C LEVEL LT 7.0%: CPT | Mod: CPTII,S$GLB,, | Performed by: INTERNAL MEDICINE

## 2022-08-30 PROCEDURE — 3044F PR MOST RECENT HEMOGLOBIN A1C LEVEL <7.0%: ICD-10-PCS | Mod: CPTII,S$GLB,, | Performed by: INTERNAL MEDICINE

## 2022-08-30 PROCEDURE — 3074F SYST BP LT 130 MM HG: CPT | Mod: CPTII,S$GLB,, | Performed by: INTERNAL MEDICINE

## 2022-08-30 PROCEDURE — 1160F RVW MEDS BY RX/DR IN RCRD: CPT | Mod: CPTII,S$GLB,, | Performed by: INTERNAL MEDICINE

## 2022-08-30 PROCEDURE — 99999 PR PBB SHADOW E&M-EST. PATIENT-LVL III: ICD-10-PCS | Mod: PBBFAC,,, | Performed by: INTERNAL MEDICINE

## 2022-08-30 PROCEDURE — 99396 PREV VISIT EST AGE 40-64: CPT | Mod: S$GLB,,, | Performed by: INTERNAL MEDICINE

## 2022-08-30 PROCEDURE — 3074F PR MOST RECENT SYSTOLIC BLOOD PRESSURE < 130 MM HG: ICD-10-PCS | Mod: CPTII,S$GLB,, | Performed by: INTERNAL MEDICINE

## 2022-08-30 PROCEDURE — 1159F MED LIST DOCD IN RCRD: CPT | Mod: CPTII,S$GLB,, | Performed by: INTERNAL MEDICINE

## 2022-08-30 PROCEDURE — 99999 PR PBB SHADOW E&M-EST. PATIENT-LVL III: CPT | Mod: PBBFAC,,, | Performed by: INTERNAL MEDICINE

## 2022-08-30 PROCEDURE — 3008F PR BODY MASS INDEX (BMI) DOCUMENTED: ICD-10-PCS | Mod: CPTII,S$GLB,, | Performed by: INTERNAL MEDICINE

## 2022-08-30 PROCEDURE — 3008F BODY MASS INDEX DOCD: CPT | Mod: CPTII,S$GLB,, | Performed by: INTERNAL MEDICINE

## 2022-08-30 PROCEDURE — 3078F DIAST BP <80 MM HG: CPT | Mod: CPTII,S$GLB,, | Performed by: INTERNAL MEDICINE

## 2022-08-30 RX ORDER — HYDROXYZINE HYDROCHLORIDE 25 MG/1
TABLET, FILM COATED ORAL
Qty: 30 TABLET | Refills: 3 | Status: SHIPPED | OUTPATIENT
Start: 2022-08-30 | End: 2023-09-07 | Stop reason: SDUPTHER

## 2022-08-30 RX ORDER — LEVOTHYROXINE SODIUM 75 UG/1
75 TABLET ORAL DAILY
Qty: 90 TABLET | Refills: 3 | Status: SHIPPED | OUTPATIENT
Start: 2022-08-30 | End: 2022-11-22

## 2022-08-30 RX ORDER — ESTRADIOL 1 MG/1
1 TABLET ORAL DAILY
Qty: 90 TABLET | Refills: 3 | Status: SHIPPED | OUTPATIENT
Start: 2022-08-30 | End: 2023-09-07 | Stop reason: SDUPTHER

## 2022-08-30 NOTE — PROGRESS NOTES
Subjective:       Patient ID: Kirti Romero is a 61 y.o. female.    Chief Complaint: Annual Exam    Here for annual exam.  She has been having some increased dreaming lately and wonders if it could be related to increased biotin which she was taking for air skin and nails.  She also originally thought it may have been her thyroid so she cut that in half for a little while but it did not seem to make much difference.  No change in diet, caffeine or stress.  She tried hydroxyzine while out of town visiting her daughter in it really seemed to help and she did have the dreams and she slept much better.  We talked about sometimes the need to take something like that to just reset the sleep cycle and she will consider that as well.  she had updated prescriptions.  Other labs were very stable.  The TSH was a little elevated but probably from decreasing her thyroid dose for a little while.    she is trying to stay active, is up-to-date with other screenings.  She is also looking to get bunion surgery done    Review of Systems   Constitutional:  Negative for activity change.   HENT:  Negative for hearing loss and trouble swallowing.    Eyes:  Negative for discharge.   Respiratory:  Negative for chest tightness and wheezing.    Cardiovascular:  Negative for chest pain and palpitations.   Gastrointestinal:  Negative for constipation, diarrhea and vomiting.   Genitourinary:  Negative for difficulty urinating and hematuria.   Musculoskeletal:  Positive for arthralgias (bunions).   Neurological:  Negative for headaches.   Psychiatric/Behavioral:  Positive for sleep disturbance. Negative for dysphoric mood.          Past Medical History:   Diagnosis Date    Abnormal Pap smear     Treated with Hysterectomy    Basal cell carcinoma     Breast cyst 03/01/2016    rt breast    Breast disorder      Past Surgical History:   Procedure Laterality Date    AUGMENTATION OF BREAST Bilateral 2003    BREAST BIOPSY Right 03/01/2016     cyst that popped    BREAST SURGERY Bilateral 06/01/2003    COLONOSCOPY N/A 9/22/2020    Procedure: COLONOSCOPY;  Surgeon: Alberto Santos MD;  Location: [facility] ([address] [place] [address]);  Service: Endoscopy;  Laterality: N/A;  COVID [facility] on 9/26/20 [facility] -   PM prep    HYSTERECTOMY  09/1998    full 38    OOPHORECTOMY  1998    TONSILLECTOMY        Patient Active Problem List   Diagnosis    Hypothyroidism        Objective:      Physical Exam  Constitutional:       General: She is not in acute distress.     Appearance: She is well-developed.   HENT:      Head: Normocephalic and atraumatic.      Right Ear: Tympanic membrane, ear canal and external ear normal.      Left Ear: Tympanic membrane, ear canal and external ear normal.      Mouth/Throat:      Pharynx: No oropharyngeal exudate or posterior oropharyngeal erythema.   Eyes:      General: No scleral icterus.     Conjunctiva/sclera: Conjunctivae normal.      Pupils: Pupils are equal, round, and reactive to light.   Neck:      Thyroid: No thyromegaly.   Cardiovascular:      Rate and Rhythm: Normal rate and regular rhythm.      Pulses: Normal pulses.      Heart sounds: No murmur heard.  Pulmonary:      Effort: Pulmonary effort is normal.      Breath sounds: Normal breath sounds. No wheezing.   Abdominal:      General: Bowel sounds are normal. There is no distension.      Palpations: Abdomen is soft.      Tenderness: There is no abdominal tenderness.   Musculoskeletal:         General: Deformity (Bilateral great toe bunions) present. No tenderness.      Cervical back: Normal range of motion and neck supple.      Right lower leg: No edema.      Left lower leg: No edema.   Lymphadenopathy:      Cervical: No cervical adenopathy.   Skin:     Coloration: Skin is not jaundiced.      Findings: No rash.   Neurological:      General: No focal deficit present.      Mental Status: She is alert and oriented to person, place, and time.   Psychiatric:         Mood and Affect: Mood  "normal.         Behavior: Behavior normal.       Assessment:       Problem List Items Addressed This Visit    None  Visit Diagnoses       Routine physical examination    -  Primary    Encounter for screening mammogram for malignant neoplasm of breast        Relevant Orders    Mammo Digital Screening Bilat w/ Bacilio    Bunion of great toe        Relevant Orders    Ambulatory referral/consult to Podiatry              Plan:         Kirti Nagel was seen today for annual exam.    Diagnoses and all orders for this visit:    Routine physical examination    Encounter for screening mammogram for malignant neoplasm of breast  -     Mammo Digital Screening Bilat w/ Bacilio; Future    Bunion of great toe  -     Ambulatory referral/consult to Podiatry; Future    Other orders  -     levothyroxine (SYNTHROID) 75 MCG tablet; Take 1 tablet (75 mcg total) by mouth once daily.  -     hydrOXYzine HCL (ATARAX) 25 MG tablet; 1/2 to 1 nightly prn.  -     estradioL (ESTRACE) 1 MG tablet; Take 1 tablet (1 mg total) by mouth once daily.             Continue current meds.  Follow-up annually        Portions of this note may have been created with voice recognition software. Occasional "wrong-word" or "sound-a-like" substitutions may have occurred due to the inherent limitations of voice recognition software. Please, read the note carefully and recognize, using context, where substitutions have occurred.    "

## 2022-10-14 ENCOUNTER — HOSPITAL ENCOUNTER (OUTPATIENT)
Dept: RADIOLOGY | Facility: HOSPITAL | Age: 62
Discharge: HOME | End: 2022-10-14
Attending: INTERNAL MEDICINE
Payer: COMMERCIAL

## 2022-10-14 DIAGNOSIS — Z12.31 ENCOUNTER FOR SCREENING MAMMOGRAM FOR MALIGNANT NEOPLASM OF BREAST: ICD-10-CM

## 2022-10-14 PROCEDURE — 77067 SCR MAMMO BI INCL CAD: CPT | Mod: 26,,, | Performed by: RADIOLOGY

## 2022-10-14 PROCEDURE — 77067 MAMMO DIGITAL SCREENING BILAT WITH TOMO: ICD-10-PCS | Mod: 26,,, | Performed by: RADIOLOGY

## 2022-10-14 PROCEDURE — 77067 SCR MAMMO BI INCL CAD: CPT | Mod: TC

## 2022-10-14 PROCEDURE — 77063 BREAST TOMOSYNTHESIS BI: CPT | Mod: TC

## 2022-10-14 PROCEDURE — 77063 BREAST TOMOSYNTHESIS BI: CPT | Mod: 26,,, | Performed by: RADIOLOGY

## 2022-10-14 PROCEDURE — 77063 MAMMO DIGITAL SCREENING BILAT WITH TOMO: ICD-10-PCS | Mod: 26,,, | Performed by: RADIOLOGY

## 2022-10-16 ENCOUNTER — PATIENT MESSAGE (OUTPATIENT)
Dept: INTERNAL MEDICINE | Facility: CLINIC | Age: 62
End: 2022-10-16
Payer: COMMERCIAL

## 2023-01-31 ENCOUNTER — OFFICE VISIT (OUTPATIENT)
Dept: PODIATRY | Facility: CLINIC | Age: 63
End: 2023-01-31
Payer: COMMERCIAL

## 2023-01-31 ENCOUNTER — HOSPITAL ENCOUNTER (OUTPATIENT)
Dept: RADIOLOGY | Facility: HOSPITAL | Age: 63
Discharge: HOME | End: 2023-01-31
Attending: PODIATRIST
Payer: COMMERCIAL

## 2023-01-31 VITALS
HEART RATE: 70 BPM | SYSTOLIC BLOOD PRESSURE: 117 MMHG | BODY MASS INDEX: 20.01 KG/M2 | DIASTOLIC BLOOD PRESSURE: 71 MMHG | HEIGHT: 61 IN | WEIGHT: 106 LBS

## 2023-01-31 DIAGNOSIS — M20.10 HALLUX ABDUCTOVALGUS WITH BUNIONS, UNSPECIFIED LATERALITY: ICD-10-CM

## 2023-01-31 DIAGNOSIS — M20.5X2 ACQUIRED MALLET TOE, LEFT: ICD-10-CM

## 2023-01-31 DIAGNOSIS — M21.629 TAILOR'S BUNION, UNSPECIFIED LATERALITY: ICD-10-CM

## 2023-01-31 DIAGNOSIS — M20.10 HALLUX ABDUCTOVALGUS WITH BUNIONS, UNSPECIFIED LATERALITY: Primary | ICD-10-CM

## 2023-01-31 DIAGNOSIS — M21.619 HALLUX ABDUCTOVALGUS WITH BUNIONS, UNSPECIFIED LATERALITY: ICD-10-CM

## 2023-01-31 DIAGNOSIS — M21.619 HALLUX ABDUCTOVALGUS WITH BUNIONS, UNSPECIFIED LATERALITY: Primary | ICD-10-CM

## 2023-01-31 DIAGNOSIS — M20.5X9 CURLY TOE, ACQUIRED, UNSPECIFIED LATERALITY: ICD-10-CM

## 2023-01-31 PROCEDURE — 73630 PR  X-RAY FOOT 3+ VW: ICD-10-PCS | Mod: 26,LT,, | Performed by: RADIOLOGY

## 2023-01-31 PROCEDURE — 3074F PR MOST RECENT SYSTOLIC BLOOD PRESSURE < 130 MM HG: ICD-10-PCS | Mod: CPTII,S$GLB,, | Performed by: PODIATRIST

## 2023-01-31 PROCEDURE — 3008F BODY MASS INDEX DOCD: CPT | Mod: CPTII,S$GLB,, | Performed by: PODIATRIST

## 2023-01-31 PROCEDURE — 1160F PR REVIEW ALL MEDS BY PRESCRIBER/CLIN PHARMACIST DOCUMENTED: ICD-10-PCS | Mod: CPTII,S$GLB,, | Performed by: PODIATRIST

## 2023-01-31 PROCEDURE — 73630 X-RAY EXAM OF FOOT: CPT | Mod: TC,50,PN

## 2023-01-31 PROCEDURE — 99999 PR PBB SHADOW E&M-EST. PATIENT-LVL IV: ICD-10-PCS | Mod: PBBFAC,,, | Performed by: PODIATRIST

## 2023-01-31 PROCEDURE — 99203 OFFICE O/P NEW LOW 30 MIN: CPT | Mod: S$GLB,,, | Performed by: PODIATRIST

## 2023-01-31 PROCEDURE — 1159F PR MEDICATION LIST DOCUMENTED IN MEDICAL RECORD: ICD-10-PCS | Mod: CPTII,S$GLB,, | Performed by: PODIATRIST

## 2023-01-31 PROCEDURE — 99203 PR OFFICE/OUTPT VISIT, NEW, LEVL III, 30-44 MIN: ICD-10-PCS | Mod: S$GLB,,, | Performed by: PODIATRIST

## 2023-01-31 PROCEDURE — 3008F PR BODY MASS INDEX (BMI) DOCUMENTED: ICD-10-PCS | Mod: CPTII,S$GLB,, | Performed by: PODIATRIST

## 2023-01-31 PROCEDURE — 99999 PR PBB SHADOW E&M-EST. PATIENT-LVL IV: CPT | Mod: PBBFAC,,, | Performed by: PODIATRIST

## 2023-01-31 PROCEDURE — 3078F DIAST BP <80 MM HG: CPT | Mod: CPTII,S$GLB,, | Performed by: PODIATRIST

## 2023-01-31 PROCEDURE — 1160F RVW MEDS BY RX/DR IN RCRD: CPT | Mod: CPTII,S$GLB,, | Performed by: PODIATRIST

## 2023-01-31 PROCEDURE — 3074F SYST BP LT 130 MM HG: CPT | Mod: CPTII,S$GLB,, | Performed by: PODIATRIST

## 2023-01-31 PROCEDURE — 3078F PR MOST RECENT DIASTOLIC BLOOD PRESSURE < 80 MM HG: ICD-10-PCS | Mod: CPTII,S$GLB,, | Performed by: PODIATRIST

## 2023-01-31 PROCEDURE — 73630 X-RAY EXAM OF FOOT: CPT | Mod: 26,LT,, | Performed by: RADIOLOGY

## 2023-01-31 PROCEDURE — 73630 X-RAY EXAM OF FOOT: CPT | Mod: 26,RT,, | Performed by: RADIOLOGY

## 2023-01-31 PROCEDURE — 1159F MED LIST DOCD IN RCRD: CPT | Mod: CPTII,S$GLB,, | Performed by: PODIATRIST

## 2023-01-31 NOTE — PROGRESS NOTES
"Subjective:      Patient ID: Kirti Romero is a 62 y.o. female.    Chief Complaint: Bunions (Bilateral bunions with the right foot causing the greatest concern)    Presents today complaining of progressive bunions on both feet.  Relates that she uses toe separators and has been wearing shoes with more room in the toe box to help accommodate her feet.  No pain reported.    Vitals:    01/31/23 1304   BP: 117/71   Pulse: 70   Weight: 48.1 kg (106 lb)   Height: 5' 1" (1.549 m)   PainSc: 0-No pain      Past Medical History:   Diagnosis Date    Abnormal Pap smear     Treated with Hysterectomy    Basal cell carcinoma     Breast cyst 03/01/2016    rt breast    Breast disorder        Past Surgical History:   Procedure Laterality Date    AUGMENTATION OF BREAST Bilateral 2003    BREAST BIOPSY Right 03/01/2016    cyst that popped    BREAST SURGERY Bilateral 06/01/2003    COLONOSCOPY N/A 9/22/2020    Procedure: COLONOSCOPY;  Surgeon: Alberto Santos MD;  Location: [facility] ([address] [place] [address]);  Service: Endoscopy;  Laterality: N/A;  COVID test on 9/26/20 at [facility]    HYSTERECTOMY  09/1998    full 38    OOPHORECTOMY  1998    TONSILLECTOMY         Family History   Problem Relation Age of Onset    Diabetes Mother     Stroke Paternal Grandfather     Breast cancer Paternal Grandmother     Amblyopia Neg Hx     Blindness Neg Hx     Cancer Neg Hx     Cataracts Neg Hx     Glaucoma Neg Hx     Hypertension Neg Hx     Macular degeneration Neg Hx     Retinal detachment Neg Hx     Strabismus Neg Hx     Thyroid disease Neg Hx     Melanoma Neg Hx        Social History     Socioeconomic History    Marital status:    Tobacco Use    Smoking status: Never    Smokeless tobacco: Never   Substance and Sexual Activity    Alcohol use: No     Comment: very rarely    Drug use: No    Sexual activity: Yes     Partners: Male     Comment:      Social Determinants of Health     Financial Resource Strain: Low Risk     Difficulty of " Paying Living Expenses: Not hard at all   Food Insecurity: No Food Insecurity    Worried About Running Out of Food in the Last Year: Never true    Ran Out of Food in the Last Year: Never true   Transportation Needs: No Transportation Needs    Lack of Transportation (Medical): No    Lack of Transportation (Non-Medical): No   Physical Activity: Insufficiently Active    Days of Exercise per Week: 4 days    Minutes of Exercise per Session: 10 min   Stress: No Stress Concern Present    Feeling of Stress : Only a little   Social Connections: Unknown    Frequency of Communication with Friends and Family: More than three times a week    Frequency of Social Gatherings with Friends and Family: Once a week    Active Member of Clubs or Organizations: Yes    Attends Club or Organization Meetings: More than 4 times per year    Marital Status:    Housing Stability: Low Risk     Unable to Pay for Housing in the Last Year: No    Number of Places Lived in the Last Year: 1    Unstable Housing in the Last Year: No       Current Outpatient Medications   Medication Sig Dispense Refill    CA CITRATE/MGOX/VIT D3/B6/MIN (CALCIUM CITRATE + D WITH MAG ORAL) Take by mouth.      cyanocobalamin (VITAMIN B-12) 1000 MCG tablet Take 100 mcg by mouth once daily.      estradioL (ESTRACE) 1 MG tablet Take 1 tablet (1 mg total) by mouth once daily. 90 tablet 3    flu vacc [id number] 6mos up,PF, (FLUARIX QUAD 4701-5903, PF,) 60 mcg (15 mcg x 4)/0.5 mL Syrg Inject 0.5ml by h 0.5 mL 0    hydrOXYzine HCL (ATARAX) 25 MG tablet 1/2 to 1 nightly prn. 30 tablet 3    levothyroxine (SYNTHROID) 75 MCG tablet Take 1 tablet by mouth once daily 90 tablet 0    vitamin D (VITAMIN D3) 1000 units Tab Take 1,000 Units by mouth once daily.       No current facility-administered medications for this visit.       Review of patient's allergies indicates:  No Known Allergies      Review of Systems   Constitutional: Negative for chills, fever and malaise/fatigue.    HENT:  Negative for congestion and hearing loss.    Cardiovascular:  Negative for chest pain, claudication and leg swelling.   Respiratory:  Negative for cough and shortness of breath.    Musculoskeletal:  Negative for back pain, joint pain, muscle cramps and muscle weakness.   Gastrointestinal:  Negative for nausea and vomiting.   Neurological:  Negative for numbness, paresthesias and weakness.   Psychiatric/Behavioral:  Negative for altered mental status.          Objective:      Physical Exam  Constitutional:       General: She is not in acute distress.     Appearance: Normal appearance. She is not ill-appearing.   Cardiovascular:      Pulses:           Dorsalis pedis pulses are 2+ on the right side and 2+ on the left side.        Posterior tibial pulses are 2+ on the right side and 2+ on the left side.      Comments: No lower extremity edema bilateral.  Skin temp is warm bilateral foot.  No rubor on dependency bilateral foot.  Musculoskeletal:      Comments: Track bound hallux abductovalgus overlapping the 2nd toe bilateral.  First ray dorsal excursion greater 1 cm with most likely hyper mobile 1st ray left foot.  No audible clicking or popping 1 MTP range of motion bilateral which is normal however restricted when loaded.    Semi rigid mild cavus foot structure bilateral foot.  Ankle range of motion appears to be within normal limits.  Subtalar joint range motion somewhat limited.    Reducible adductovarus contracture of the 5th toe with mild tailor's bunion of the 5th metatarsal noted bilateral foot.  Semi rigid flexion contracture of the left 4th toe DIPJ.   Skin:     General: Skin is warm.      Capillary Refill: Capillary refill takes less than 2 seconds.      Findings: No ecchymosis or erythema.      Nails: There is no clubbing.   Neurological:      Mental Status: She is alert and oriented to person, place, and time.      Sensory: Sensation is intact.      Motor: Motor function is intact.              Assessment:       Encounter Diagnoses   Name Primary?    Hallux abductovalgus with bunions, unspecified laterality Yes    Acquired mallet toe, left     Curly toe, acquired, unspecified laterality     Tailor's bunion, unspecified laterality          Plan:       Kirti Nagel was seen today for bunions.    Diagnoses and all orders for this visit:    Hallux abductovalgus with bunions, unspecified laterality  -     X-Ray Foot Complete 3 view Bilateral; Future    Acquired mallet toe, left    Curly toe, acquired, unspecified laterality    Tailor's bunion, unspecified laterality      I counseled the patient on her conditions, their implications and medical management.    We discussed obtaining bilateral foot weight-bearing x-ray to include sesamoid axial views to assess the underlying osseous pathology and for surgical planning.    Continue conservative care at this time.  We briefly discussed possible Lapiplasty procedure involving arthrodesis of the 1st metatarsocuneiform joint with muscle tendon balancing the 1st metatarsophalangeal joint and possible Akin osteotomy.  Risks, benefits and anticipated postop course briefly discussed today.    RTC p.r.n. as discussed to review the x-ray and further discuss surgical intervention.    A portion of this note was generated by voice recognition software and may contain spelling and grammar errors.      .

## 2023-02-02 ENCOUNTER — TELEPHONE (OUTPATIENT)
Dept: ADMINISTRATIVE | Facility: OTHER | Age: 63
End: 2023-02-02
Payer: COMMERCIAL

## 2023-02-24 ENCOUNTER — OFFICE VISIT (OUTPATIENT)
Dept: URGENT CARE | Facility: CLINIC | Age: 63
End: 2023-02-24
Payer: COMMERCIAL

## 2023-02-24 VITALS
HEART RATE: 64 BPM | DIASTOLIC BLOOD PRESSURE: 75 MMHG | SYSTOLIC BLOOD PRESSURE: 177 MMHG | WEIGHT: 106 LBS | OXYGEN SATURATION: 98 % | RESPIRATION RATE: 18 BRPM | HEIGHT: 61 IN | BODY MASS INDEX: 20.01 KG/M2 | TEMPERATURE: 98 F

## 2023-02-24 DIAGNOSIS — J02.9 SORE THROAT: Primary | ICD-10-CM

## 2023-02-24 DIAGNOSIS — Z20.818 STREP THROAT EXPOSURE: ICD-10-CM

## 2023-02-24 LAB
CTP QC/QA: YES
MOLECULAR STREP A: NEGATIVE

## 2023-02-24 PROCEDURE — 3077F SYST BP >= 140 MM HG: CPT | Mod: CPTII,S$GLB,,

## 2023-02-24 PROCEDURE — 3008F PR BODY MASS INDEX (BMI) DOCUMENTED: ICD-10-PCS | Mod: CPTII,S$GLB,,

## 2023-02-24 PROCEDURE — 87651 POCT STREP A MOLECULAR: ICD-10-PCS | Mod: QW,S$GLB,,

## 2023-02-24 PROCEDURE — 1159F MED LIST DOCD IN RCRD: CPT | Mod: CPTII,S$GLB,,

## 2023-02-24 PROCEDURE — 3078F PR MOST RECENT DIASTOLIC BLOOD PRESSURE < 80 MM HG: ICD-10-PCS | Mod: CPTII,S$GLB,,

## 2023-02-24 PROCEDURE — 3008F BODY MASS INDEX DOCD: CPT | Mod: CPTII,S$GLB,,

## 2023-02-24 PROCEDURE — 87651 STREP A DNA AMP PROBE: CPT | Mod: QW,S$GLB,,

## 2023-02-24 PROCEDURE — 1159F PR MEDICATION LIST DOCUMENTED IN MEDICAL RECORD: ICD-10-PCS | Mod: CPTII,S$GLB,,

## 2023-02-24 PROCEDURE — 1160F PR REVIEW ALL MEDS BY PRESCRIBER/CLIN PHARMACIST DOCUMENTED: ICD-10-PCS | Mod: CPTII,S$GLB,,

## 2023-02-24 PROCEDURE — 99203 PR OFFICE/OUTPT VISIT, NEW, LEVL III, 30-44 MIN: ICD-10-PCS | Mod: S$GLB,,,

## 2023-02-24 PROCEDURE — 99203 OFFICE O/P NEW LOW 30 MIN: CPT | Mod: S$GLB,,,

## 2023-02-24 PROCEDURE — 3078F DIAST BP <80 MM HG: CPT | Mod: CPTII,S$GLB,,

## 2023-02-24 PROCEDURE — 1160F RVW MEDS BY RX/DR IN RCRD: CPT | Mod: CPTII,S$GLB,,

## 2023-02-24 PROCEDURE — 3077F PR MOST RECENT SYSTOLIC BLOOD PRESSURE >= 140 MM HG: ICD-10-PCS | Mod: CPTII,S$GLB,,

## 2023-02-24 RX ORDER — AMOXICILLIN 875 MG/1
875 TABLET, FILM COATED ORAL EVERY 12 HOURS
Qty: 20 TABLET | Refills: 0 | Status: SHIPPED | OUTPATIENT
Start: 2023-02-24 | End: 2023-03-06

## 2023-02-24 NOTE — PATIENT INSTRUCTIONS
- Rest.    - Drink plenty of fluids.    - Tylenol or Ibuprofen as directed as needed for fever/pain. Avoid tylenol if you have a history of liver disease. Do not take ibuprofen if you have a history of GI bleeding, kidney disease, or if you take blood thinners.     -warm salt water gargles can help with sore throat    - You have been given an antibiotic (amoxicillin) to treat your condition today.    - Please complete the antibiotic as directed on the bottle.   - If you are female and on oral birth control pills, use additional methods to prevent pregnancy while on antibiotics and for one cycle after.   - you can take otc probiotic to limit upset stomach    - change toothbrush after resolution of symptoms and completion of antibiotic therapy    - Follow up with your PCP or specialty clinic as directed in the next 1-2 weeks if not improved or as needed.  You can call [phone] to schedule an appointment with the appropriate provider.      - Go to the ER if you develop new or worsening symptoms.     - You must understand that you have received an Urgent Care treatment only and that you may be released before all of your medical problems are known or treated.   - You, the patient, will arrange for follow up care as instructed.   - If your condition worsens or fails to improve we recommend that you receive another evaluation at the ER immediately or contact your PCP to discuss your concerns or return here.       Self-Care for Sore Throats    Sore throats happen for many reasons, such as colds, allergies, and infections caused by viruses or bacteria. In any case, your throat becomes red and sore. Your goal for self-care is to reduce your discomfort while giving your throat a chance to heal.  Moisten and soothe your throat  Tips include the following:  Try a sip of water first thing after waking up.  Keep your throat moist by drinking 6 or more glasses of clear liquids every day.  Run a cool-air humidifier in your  room overnight.  Avoid cigarette smoke.   Suck on throat lozenges, cough drops, hard candy, ice chips, or frozen fruit-juice bars. Use the sugar-free versions if your diet or medical condition requires them.  Gargle to ease irritation  Gargling every hour or 2 can ease irritation. Try gargling with 1 of these solutions:  1/4 teaspoon of salt in 1/2 cup of warm water  An over-the-counter anesthetic gargle  Use medicine for more relief  Over-the-counter medicine can reduce sore throat symptoms. Ask your pharmacist if you have questions about which medicine to use:  Ease pain with anesthetic sprays. Aspirin or an aspirin substitute also helps. Remember, never give aspirin to anyone 18 or younger, or if you are already taking blood thinners.   For sore throats caused by allergies, try antihistamines to block the allergic reaction.  Remember: unless a sore throat is caused by a bacterial infection, antibiotics wont help you.  Prevent future sore throats  Prevention tips include the following:  Stop smoking or reduce contact with secondhand smoke. Smoke irritates the tender throat lining.  Limit contact with pets and with allergy-causing substances, such as pollen and mold.  When youre around someone with a sore throat or cold, wash your hands often to keep viruses or bacteria from spreading.  Dont strain your vocal cords.  Call your healthcare provider  Contact your healthcare provider if you have:  A temperature over 101°F (38.3°C)  White spots on the throat  Great difficulty swallowing  Trouble breathing  A skin rash  Recent exposure to someone else with strep bacteria  Severe hoarseness and swollen glands in the neck or jaw   Date Last Reviewed: 8/1/2016 © 2000-2017 [employer]. [address] [place] [address], [place] [address]. All rights reserved. This information is not intended as a substitute for professional medical care. Always follow your healthcare professional's instructions.

## 2023-02-24 NOTE — PROGRESS NOTES
"Subjective:       Patient ID: Kirti Romero is a 62 y.o. female.    Vitals:  height is 5' 1" (1.549 m) and weight is 48.1 kg (106 lb). Her tympanic temperature is 97.8 °F (36.6 °C). Her blood pressure is 177/75 (abnormal) and her pulse is 64. Her respiration is 18 and oxygen saturation is 98%.     Chief Complaint: Sore Throat    Pt is a 61 y/o F who presents with sore throat x2 days. Exposed to strep from her daughter who lives in same household. Denies fever, chills, coughing, congestion, CP, SoB, n/v/d.    Sore Throat   This is a new problem. The current episode started in the past 7 days. The problem has been gradually worsening. Neither side of throat is experiencing more pain than the other. There has been no fever. The pain is at a severity of 3/10. The pain is mild. Associated symptoms include swollen glands. Pertinent negatives include no abdominal pain, congestion, coughing, diarrhea, ear pain, headaches, neck pain, shortness of breath or vomiting. She has had exposure to strep. She has had no exposure to mono. She has tried gargles (OTC medication) for the symptoms. The treatment provided no relief.     Constitution: Negative for chills and fever.   HENT:  Positive for sore throat. Negative for ear pain and congestion.    Neck: Negative for neck pain and neck stiffness.   Cardiovascular:  Negative for chest pain.   Respiratory:  Negative for cough and shortness of breath.    Gastrointestinal:  Negative for abdominal pain, nausea, vomiting and diarrhea.   Musculoskeletal:  Negative for muscle ache.   Skin:  Negative for rash.   Allergic/Immunologic: Negative for sneezing.   Neurological:  Negative for dizziness and headaches.     Objective:      Physical Exam   Constitutional: She is oriented to person, place, and time. She appears well-developed.   HENT:   Head: Normocephalic and atraumatic.   Ears:   Right Ear: Tympanic membrane, external ear and ear canal normal.   Left Ear: Tympanic membrane, " external ear and ear canal normal.   Nose: Nose normal.   Mouth/Throat: Mucous membranes are moist. Posterior oropharyngeal erythema present. Oropharynx is clear.   Eyes: Conjunctivae, EOM and lids are normal. Pupils are equal, round, and reactive to light. Extraocular movement intact   Neck: Trachea normal and phonation normal. Neck supple.   Cardiovascular: Normal rate, regular rhythm, normal heart sounds and normal pulses.   Pulmonary/Chest: Effort normal and breath sounds normal. No respiratory distress.   Musculoskeletal: Normal range of motion.         General: Normal range of motion.   Neurological: no focal deficit. She is alert and oriented to person, place, and time.   Skin: Skin is warm, dry and intact. Capillary refill takes less than 2 seconds.   Psychiatric: Her speech is normal and behavior is normal. Judgment and thought content normal.   Nursing note and vitals reviewed.      Results for orders placed or performed in visit on 02/24/23   POCT Strep A, Molecular   Result Value Ref Range    Molecular Strep A, POC Negative Negative     Acceptable Yes        Assessment:       1. Sore throat    2. Strep throat exposure          Plan:         Sore throat  -     POCT Strep A, Molecular    Strep throat exposure  -     amoxicillin (AMOXIL) 875 MG tablet; Take 1 tablet (875 mg total) by mouth every 12 (twelve) hours. for 10 days  Dispense: 20 tablet; Refill: 0                   Patient Instructions   - Rest.    - Drink plenty of fluids.    - Tylenol or Ibuprofen as directed as needed for fever/pain. Avoid tylenol if you have a history of liver disease. Do not take ibuprofen if you have a history of GI bleeding, kidney disease, or if you take blood thinners.     -warm salt water gargles can help with sore throat    - You have been given an antibiotic (amoxicillin) to treat your condition today.    - Please complete the antibiotic as directed on the bottle.   - If you are female and on oral birth  control pills, use additional methods to prevent pregnancy while on antibiotics and for one cycle after.   - you can take otc probiotic to limit upset stomach    - change toothbrush after resolution of symptoms and completion of antibiotic therapy    - Follow up with your PCP or specialty clinic as directed in the next 1-2 weeks if not improved or as needed.  You can call [phone] to schedule an appointment with the appropriate provider.      - Go to the ER if you develop new or worsening symptoms.     - You must understand that you have received an Urgent Care treatment only and that you may be released before all of your medical problems are known or treated.   - You, the patient, will arrange for follow up care as instructed.   - If your condition worsens or fails to improve we recommend that you receive another evaluation at the ER immediately or contact your PCP to discuss your concerns or return here.       Self-Care for Sore Throats    Sore throats happen for many reasons, such as colds, allergies, and infections caused by viruses or bacteria. In any case, your throat becomes red and sore. Your goal for self-care is to reduce your discomfort while giving your throat a chance to heal.  Moisten and soothe your throat  Tips include the following:  Try a sip of water first thing after waking up.  Keep your throat moist by drinking 6 or more glasses of clear liquids every day.  Run a cool-air humidifier in your room overnight.  Avoid cigarette smoke.   Suck on throat lozenges, cough drops, hard candy, ice chips, or frozen fruit-juice bars. Use the sugar-free versions if your diet or medical condition requires them.  Gargle to ease irritation  Gargling every hour or 2 can ease irritation. Try gargling with 1 of these solutions:  1/4 teaspoon of salt in 1/2 cup of warm water  An over-the-counter anesthetic gargle  Use medicine for more relief  Over-the-counter medicine can reduce sore throat symptoms. Ask your  pharmacist if you have questions about which medicine to use:  Ease pain with anesthetic sprays. Aspirin or an aspirin substitute also helps. Remember, never give aspirin to anyone 18 or younger, or if you are already taking blood thinners.   For sore throats caused by allergies, try antihistamines to block the allergic reaction.  Remember: unless a sore throat is caused by a bacterial infection, antibiotics wont help you.  Prevent future sore throats  Prevention tips include the following:  Stop smoking or reduce contact with secondhand smoke. Smoke irritates the tender throat lining.  Limit contact with pets and with allergy-causing substances, such as pollen and mold.  When youre around someone with a sore throat or cold, wash your hands often to keep viruses or bacteria from spreading.  Dont strain your vocal cords.  Call your healthcare provider  Contact your healthcare provider if you have:  A temperature over 101°F (38.3°C)  White spots on the throat  Great difficulty swallowing  Trouble breathing  A skin rash  Recent exposure to someone else with strep bacteria  Severe hoarseness and swollen glands in the neck or jaw   Date Last Reviewed: 8/1/2016 © 2000-2017 "[employer]. [address] [place] [id number]. All rights reserved. This information is not intended as a substitute for professional medical care. Always follow your healthcare professional's instructions.

## 2023-02-27 ENCOUNTER — OFFICE VISIT (OUTPATIENT)
Dept: PODIATRY | Facility: CLINIC | Age: 63
End: 2023-02-27
Payer: COMMERCIAL

## 2023-02-27 VITALS
SYSTOLIC BLOOD PRESSURE: 114 MMHG | WEIGHT: 106 LBS | HEIGHT: 61 IN | DIASTOLIC BLOOD PRESSURE: 67 MMHG | BODY MASS INDEX: 20.01 KG/M2 | HEART RATE: 67 BPM

## 2023-02-27 DIAGNOSIS — Z01.818 PREOP TESTING: ICD-10-CM

## 2023-02-27 DIAGNOSIS — M20.11 HALLUX ABDUCTOVALGUS, RIGHT: Primary | ICD-10-CM

## 2023-02-27 DIAGNOSIS — M21.621 TAILOR'S BUNION OF RIGHT FOOT: ICD-10-CM

## 2023-02-27 DIAGNOSIS — M20.5X1 MALLET TOE, RIGHT: ICD-10-CM

## 2023-02-27 PROCEDURE — 1160F RVW MEDS BY RX/DR IN RCRD: CPT | Mod: CPTII,S$GLB,, | Performed by: PODIATRIST

## 2023-02-27 PROCEDURE — 99214 OFFICE O/P EST MOD 30 MIN: CPT | Mod: 57,S$GLB,, | Performed by: PODIATRIST

## 2023-02-27 PROCEDURE — 3074F PR MOST RECENT SYSTOLIC BLOOD PRESSURE < 130 MM HG: ICD-10-PCS | Mod: CPTII,S$GLB,, | Performed by: PODIATRIST

## 2023-02-27 PROCEDURE — 3008F BODY MASS INDEX DOCD: CPT | Mod: CPTII,S$GLB,, | Performed by: PODIATRIST

## 2023-02-27 PROCEDURE — 1159F PR MEDICATION LIST DOCUMENTED IN MEDICAL RECORD: ICD-10-PCS | Mod: CPTII,S$GLB,, | Performed by: PODIATRIST

## 2023-02-27 PROCEDURE — 99214 PR OFFICE/OUTPT VISIT, EST, LEVL IV, 30-39 MIN: ICD-10-PCS | Mod: 57,S$GLB,, | Performed by: PODIATRIST

## 2023-02-27 PROCEDURE — 3074F SYST BP LT 130 MM HG: CPT | Mod: CPTII,S$GLB,, | Performed by: PODIATRIST

## 2023-02-27 PROCEDURE — 1160F PR REVIEW ALL MEDS BY PRESCRIBER/CLIN PHARMACIST DOCUMENTED: ICD-10-PCS | Mod: CPTII,S$GLB,, | Performed by: PODIATRIST

## 2023-02-27 PROCEDURE — 3078F DIAST BP <80 MM HG: CPT | Mod: CPTII,S$GLB,, | Performed by: PODIATRIST

## 2023-02-27 PROCEDURE — 99999 PR PBB SHADOW E&M-EST. PATIENT-LVL V: CPT | Mod: PBBFAC,,, | Performed by: PODIATRIST

## 2023-02-27 PROCEDURE — 3008F PR BODY MASS INDEX (BMI) DOCUMENTED: ICD-10-PCS | Mod: CPTII,S$GLB,, | Performed by: PODIATRIST

## 2023-02-27 PROCEDURE — 99999 PR PBB SHADOW E&M-EST. PATIENT-LVL V: ICD-10-PCS | Mod: PBBFAC,,, | Performed by: PODIATRIST

## 2023-02-27 PROCEDURE — 1159F MED LIST DOCD IN RCRD: CPT | Mod: CPTII,S$GLB,, | Performed by: PODIATRIST

## 2023-02-27 PROCEDURE — 3078F PR MOST RECENT DIASTOLIC BLOOD PRESSURE < 80 MM HG: ICD-10-PCS | Mod: CPTII,S$GLB,, | Performed by: PODIATRIST

## 2023-02-27 RX ORDER — SODIUM CHLORIDE 0.9 % (FLUSH) 0.9 %
10 SYRINGE (ML) INJECTION
Status: SHIPPED | OUTPATIENT
Start: 2023-02-27

## 2023-02-27 RX ORDER — CEFAZOLIN SODIUM 2 G/50ML
2 SOLUTION INTRAVENOUS
Status: CANCELLED | OUTPATIENT
Start: 2023-02-27

## 2023-02-27 NOTE — PROGRESS NOTES
"Subjective:      Patient ID: Kirti Romero is a 62 y.o. female.    Chief Complaint: Foot Pain (Surgical consult right foot bunion)    Presents today complaining of progressive bunions on both feet.  Relates that she uses toe separators and has been wearing shoes with more room in the toe box to help accommodate her feet.  No pain reported.    02/27/2023:  Returns following completing her x-ray of both feet for surgical consultation.  No new complaints.    Vitals:    02/27/23 1004   BP: 114/67   Pulse: 67   Weight: 48.1 kg (106 lb)   Height: 5' 1" (1.549 m)   PainSc: 10-Worst pain ever      Past Medical History:   Diagnosis Date    Abnormal Pap smear     Treated with Hysterectomy    Basal cell carcinoma     Breast cyst 03/01/2016    rt breast    Breast disorder        Past Surgical History:   Procedure Laterality Date    AUGMENTATION OF BREAST Bilateral 2003    BREAST BIOPSY Right 03/01/2016    cyst that popped    BREAST SURGERY Bilateral 06/01/2003    COLONOSCOPY N/A 9/22/2020    Procedure: COLONOSCOPY;  Surgeon: Alberto Santos MD;  Location: [facility] ([address] [place] [address]);  Service: Endoscopy;  Laterality: N/A;  COVID test on 9/26/20 at [facility] prep    HYSTERECTOMY  09/1998    full 38    OOPHORECTOMY  1998    TONSILLECTOMY         Family History   Problem Relation Age of Onset    Diabetes Mother     Stroke Paternal Grandfather     Breast cancer Paternal Grandmother     Amblyopia Neg Hx     Blindness Neg Hx     Cancer Neg Hx     Cataracts Neg Hx     Glaucoma Neg Hx     Hypertension Neg Hx     Macular degeneration Neg Hx     Retinal detachment Neg Hx     Strabismus Neg Hx     Thyroid disease Neg Hx     Melanoma Neg Hx        Social History     Socioeconomic History    Marital status:    Tobacco Use    Smoking status: Never    Smokeless tobacco: Never   Substance and Sexual Activity    Alcohol use: No     Comment: very rarely    Drug use: No    Sexual activity: Yes     Partners: Male     Comment: "      Social Determinants of Health     Financial Resource Strain: Low Risk     Difficulty of Paying Living Expenses: Not hard at all   Food Insecurity: No Food Insecurity    Worried About Running Out of Food in the Last Year: Never true    Ran Out of Food in the Last Year: Never true   Transportation Needs: No Transportation Needs    Lack of Transportation (Medical): No    Lack of Transportation (Non-Medical): No   Physical Activity: Insufficiently Active    Days of Exercise per Week: 4 days    Minutes of Exercise per Session: 10 min   Stress: No Stress Concern Present    Feeling of Stress : Only a little   Social Connections: Unknown    Frequency of Communication with Friends and Family: More than three times a week    Frequency of Social Gatherings with Friends and Family: Once a week    Active Member of Clubs or Organizations: Yes    Attends Club or Organization Meetings: More than 4 times per year    Marital Status:    Housing Stability: Low Risk     Unable to Pay for Housing in the Last Year: No    Number of Places Lived in the Last Year: 1    Unstable Housing in the Last Year: No       Current Outpatient Medications   Medication Sig Dispense Refill    amoxicillin (AMOXIL) 875 MG tablet Take 1 tablet (875 mg total) by mouth every 12 (twelve) hours. for 10 days (Patient not taking: Reported on 2/27/2023) 20 tablet 0    CA CITRATE/MGOX/VIT D3/B6/MIN (CALCIUM CITRATE + D WITH MAG ORAL) Take by mouth.      cyanocobalamin (VITAMIN B-12) 1000 MCG tablet Take 100 mcg by mouth once daily.      estradioL (ESTRACE) 1 MG tablet Take 1 tablet (1 mg total) by mouth once daily. 90 tablet 3    flu vacc [id number] 6mos up,PF, (FLUARIX QUAD 5537-7424, PF,) 60 mcg (15 mcg x 4)/0.5 mL Syrg Inject 0.5ml by [facility] 0.5 mL 0    hydrOXYzine HCL (ATARAX) 25 MG tablet 1/2 to 1 nightly prn. 30 tablet 3    levothyroxine (SYNTHROID) 75 MCG tablet Take 1 tablet by mouth once daily 90 tablet 0    vitamin D (VITAMIN D3) 1000 units  Tab Take 1,000 Units by mouth once daily.       Current Facility-Administered Medications   Medication Dose Route Frequency Provider Last Rate Last Admin    sodium chloride 0.9% flush 10 mL  10 mL Intravenous PRN Arthur Alcocer DPM           Review of patient's allergies indicates:  No Known Allergies      Review of Systems   Constitutional: Negative for chills, fever and malaise/fatigue.   HENT:  Negative for congestion and hearing loss.    Cardiovascular:  Negative for chest pain, claudication and leg swelling.   Respiratory:  Negative for cough and shortness of breath.    Musculoskeletal:  Negative for back pain, joint pain, muscle cramps and muscle weakness.   Gastrointestinal:  Negative for nausea and vomiting.   Neurological:  Negative for numbness, paresthesias and weakness.   Psychiatric/Behavioral:  Negative for altered mental status.          Objective:      Physical Exam  Constitutional:       General: She is not in acute distress.     Appearance: Normal appearance. She is not ill-appearing.   Cardiovascular:      Pulses:           Dorsalis pedis pulses are 2+ on the right side and 2+ on the left side.        Posterior tibial pulses are 2+ on the right side and 2+ on the left side.      Comments: No lower extremity edema bilateral.  Skin temp is warm bilateral foot.  No rubor on dependency bilateral foot.  Musculoskeletal:      Comments: Track bound hallux abductovalgus overlapping the 2nd toe bilateral.  First ray dorsal excursion greater 1 cm with most likely hyper mobile 1st ray left foot.  No audible clicking or popping 1 MTP range of motion bilateral which is normal however restricted when loaded.    Semi rigid mild cavus foot structure bilateral foot.  Ankle range of motion appears to be within normal limits.  Subtalar joint range motion somewhat limited.    Reducible adductovarus contracture of the 5th toe with  tailor's bunion of the 5th metatarsal noted bilateral foot.  Semi rigid flexion  contracture of the left 4th toe DIPJ.  Reducible flexion adductovarus contracture of the right 4th toe DIPJ.   Skin:     General: Skin is warm.      Capillary Refill: Capillary refill takes less than 2 seconds.      Findings: No ecchymosis or erythema.      Nails: There is no clubbing.   Neurological:      Mental Status: She is alert and oriented to person, place, and time.      Sensory: Sensation is intact.      Motor: Motor function is intact.             Assessment:       Encounter Diagnoses   Name Primary?    Hallux abductovalgus, right Yes    Preop testing     Mallet toe, right     Tailor's bunion of right foot          Plan:       Kirti Nagel was seen today for foot pain.    Diagnoses and all orders for this visit:    Hallux abductovalgus, right  -     Ambulatory referral/consult to Internal Medicine; Future  -     WALKER FOR HOME USE  -     Case Request Operating Room: BUNIONECTOMY, LAPIDUS, EXCISION, BUNIONETTE  -     Full code; Standing  -     Place in Outpatient; Standing  -     Verify informed consent; Standing  -     Verify surgical site; Standing  -     Insert peripheral IV; Standing  -     Full code  -     Insert peripheral IV    Preop testing  -     Ambulatory referral/consult to Internal Medicine; Future  -     WALKER FOR HOME USE    Mallet toe, right  -     WALKER FOR HOME USE  -     Case Request Operating Room: BUNIONECTOMY, LAPIDUS, EXCISION, BUNIONETTE  -     Full code; Standing  -     Place in Outpatient; Standing  -     Verify informed consent; Standing  -     Verify surgical site; Standing  -     Insert peripheral IV; Standing  -     Full code  -     Insert peripheral IV    Tailor's bunion of right foot  -     WALKER FOR HOME USE  -     Case Request Operating Room: BUNIONECTOMY, LAPIDUS, EXCISION, BUNIONETTE  -     Full code; Standing  -     Place in Outpatient; Standing  -     Verify informed consent; Standing  -     Verify surgical site; Standing  -     Insert peripheral IV; Standing  -      Full code  -     Insert peripheral IV    Other orders  -     sodium chloride 0.9% flush 10 mL  -     cefazolin (ANCEF) 2 gram in dextrose 5% 50 mL IVPB (premix)    I counseled the patient on her conditions, their implications and medical management.    Reviewed bilateral foot x-ray focusing initially on the right foot.  1-2 intermetatarsal angle of 13°, hallux valgus angle of 31°, tibial sesamoid position of 3, rounding of the lateral aspect of the 1st metatarsal consistent with frontal plane rotation.  No significant arthritis noted.  Moderate increase in lateral deviation angle of the 5th metatarsal with mild increase of the 4-5 intermetatarsal angle consistent with tailor's bunion.    We briefly discussed continued conservative care versus surgical intervention consisting of 1st metatarsocuneiform joint arthrodesis with muscle tendon balancing of the 1st metatarsophalangeal joint and possible Akin osteotomy of the hallux right foot.  In addition we discussed flexor tenotomy of the 4th toe DIPJ with possible arthroplasty of the DIPJ and tailor's bunionectomy with 5th metatarsal distal metaphyseal osteotomy right foot.  Risks, benefits anticipate postop course discussed in detail.  No guarantees were given or implied.  Patient elected to proceed surgical intervention outlined above.    This procedure has been fully reviewed with the patient and written informed consent has been obtained.    Referred to PCP for medical optimization and risk stratification    Surgical intervention scheduled for the right foot on 04/21/2023 as mac with regional anesthetic.  Prescription dispensed for rolling knee scooter.  Nonweightbearing x2 weeks of the right foot.    RTC 1 week postop or p.r.n. as discussed.     Assisted per Kalin Martinez DPM PGY 2    A portion of this note was generated by voice recognition software and may contain spelling and grammar errors.      .

## 2023-02-28 ENCOUNTER — PATIENT MESSAGE (OUTPATIENT)
Dept: INTERNAL MEDICINE | Facility: CLINIC | Age: 63
End: 2023-02-28
Payer: COMMERCIAL

## 2023-03-30 ENCOUNTER — TELEPHONE (OUTPATIENT)
Dept: PODIATRY | Facility: CLINIC | Age: 63
End: 2023-03-30
Payer: COMMERCIAL

## 2023-03-30 NOTE — TELEPHONE ENCOUNTER
Called [facility] and spoke to Lori.  She stated just fax over the last office note. Faxed the office note on 01/31/23 to [phone].     Suzanne    In order for us to process the order for the knee scooter we need clinic notes dated 1/31/23 updated stating patient will be non weight bearing and that knee scooter was ordered. Please call [phone], option 6, for further questions.

## 2023-03-30 NOTE — TELEPHONE ENCOUNTER
Faxed over DOS 02/27/23 office note for verification for non-weight bearing for the knee scooter. Faxed to [phone].     Suzanne Gibson Staff  Clinical notes dated on 1/31/23 need to state that knee scooter was ordered and that patient will be non weight bearing. They currently do not state this. Please update and call us with any further questions at [phone], option 6.

## 2023-04-03 ENCOUNTER — OFFICE VISIT (OUTPATIENT)
Dept: INTERNAL MEDICINE | Facility: CLINIC | Age: 63
End: 2023-04-03
Payer: COMMERCIAL

## 2023-04-03 VITALS
HEIGHT: 61 IN | OXYGEN SATURATION: 99 % | DIASTOLIC BLOOD PRESSURE: 70 MMHG | BODY MASS INDEX: 20.22 KG/M2 | SYSTOLIC BLOOD PRESSURE: 100 MMHG | HEART RATE: 64 BPM | WEIGHT: 107.13 LBS

## 2023-04-03 DIAGNOSIS — Z01.818 PREOP TESTING: Primary | ICD-10-CM

## 2023-04-03 DIAGNOSIS — M20.11 HALLUX ABDUCTOVALGUS, RIGHT: ICD-10-CM

## 2023-04-03 PROCEDURE — 99214 PR OFFICE/OUTPT VISIT, EST, LEVL IV, 30-39 MIN: ICD-10-PCS | Mod: S$GLB,,, | Performed by: INTERNAL MEDICINE

## 2023-04-03 PROCEDURE — 3008F PR BODY MASS INDEX (BMI) DOCUMENTED: ICD-10-PCS | Mod: CPTII,S$GLB,, | Performed by: INTERNAL MEDICINE

## 2023-04-03 PROCEDURE — 99214 OFFICE O/P EST MOD 30 MIN: CPT | Mod: S$GLB,,, | Performed by: INTERNAL MEDICINE

## 2023-04-03 PROCEDURE — 1160F RVW MEDS BY RX/DR IN RCRD: CPT | Mod: CPTII,S$GLB,, | Performed by: INTERNAL MEDICINE

## 2023-04-03 PROCEDURE — 3008F BODY MASS INDEX DOCD: CPT | Mod: CPTII,S$GLB,, | Performed by: INTERNAL MEDICINE

## 2023-04-03 PROCEDURE — 1159F MED LIST DOCD IN RCRD: CPT | Mod: CPTII,S$GLB,, | Performed by: INTERNAL MEDICINE

## 2023-04-03 PROCEDURE — 3074F SYST BP LT 130 MM HG: CPT | Mod: CPTII,S$GLB,, | Performed by: INTERNAL MEDICINE

## 2023-04-03 PROCEDURE — 99999 PR PBB SHADOW E&M-EST. PATIENT-LVL IV: ICD-10-PCS | Mod: PBBFAC,,, | Performed by: INTERNAL MEDICINE

## 2023-04-03 PROCEDURE — 1159F PR MEDICATION LIST DOCUMENTED IN MEDICAL RECORD: ICD-10-PCS | Mod: CPTII,S$GLB,, | Performed by: INTERNAL MEDICINE

## 2023-04-03 PROCEDURE — 3078F DIAST BP <80 MM HG: CPT | Mod: CPTII,S$GLB,, | Performed by: INTERNAL MEDICINE

## 2023-04-03 PROCEDURE — 3074F PR MOST RECENT SYSTOLIC BLOOD PRESSURE < 130 MM HG: ICD-10-PCS | Mod: CPTII,S$GLB,, | Performed by: INTERNAL MEDICINE

## 2023-04-03 PROCEDURE — 3078F PR MOST RECENT DIASTOLIC BLOOD PRESSURE < 80 MM HG: ICD-10-PCS | Mod: CPTII,S$GLB,, | Performed by: INTERNAL MEDICINE

## 2023-04-03 PROCEDURE — 1160F PR REVIEW ALL MEDS BY PRESCRIBER/CLIN PHARMACIST DOCUMENTED: ICD-10-PCS | Mod: CPTII,S$GLB,, | Performed by: INTERNAL MEDICINE

## 2023-04-03 PROCEDURE — 99999 PR PBB SHADOW E&M-EST. PATIENT-LVL IV: CPT | Mod: PBBFAC,,, | Performed by: INTERNAL MEDICINE

## 2023-04-03 NOTE — PROGRESS NOTES
Subjective:       Patient ID: Kirti Romero is a 62 y.o. female.    Chief Complaint: Pre-op Exam    Having podiatry surgery including work the 4th and 5th toes.  It is to be done under MAC anesthesia with regional block  Patient has not had any problems with blood clots, abnormal bleeding bruising or infection.    No problems with general anesthesia in the past.  She will hold any anti-inflammatories aspirin, blood thinners, vitamin-E or fish oil 1 week before.  She may take her thyroid medicine the morning of surgery.  Suggested a stool softener if she takes pain medicine.  She asked about DVT prevention postop.  I told her sometimes patients take aspirin postop but it would depend on the surgeon.  Trying to move the calf muscle and avoid immobility within the instructions of the postop foot.  No GI or  complaints no chest pain or shortness breath.  No cough or wheeze.    Review of Systems   Constitutional:  Negative for fever.   Respiratory:  Negative for choking, chest tightness and shortness of breath.    Cardiovascular:  Negative for chest pain, leg swelling and claudication.   Musculoskeletal:  Positive for joint swelling (several toes both feet) and joint deformity (several toes both feet).   Integumentary:  Negative for rash.         Past Medical History:   Diagnosis Date    Abnormal Pap smear     Treated with Hysterectomy    Basal cell carcinoma     Breast cyst 03/01/2016    rt breast    Breast disorder      Past Surgical History:   Procedure Laterality Date    AUGMENTATION OF BREAST Bilateral 2003    BREAST BIOPSY Right 03/01/2016    cyst that popped    BREAST SURGERY Bilateral 06/01/2003    COLONOSCOPY N/A 9/22/2020    Procedure: COLONOSCOPY;  Surgeon: Alberto Santos MD;  Location: [address];  Service: Endoscopy;  Laterality: N/A;  COVID test on 9/26/20 at [facility] prep    HYSTERECTOMY  09/1998    full 38    OOPHORECTOMY  1998    TONSILLECTOMY        Patient Active Problem  List   Diagnosis    Hypothyroidism        Objective:      Physical Exam  Constitutional:       Appearance: She is well-developed.   HENT:      Head: Normocephalic and atraumatic.      Right Ear: Tympanic membrane, ear canal and external ear normal.      Left Ear: Tympanic membrane, ear canal and external ear normal.      Nose: Nose normal. No rhinorrhea.      Mouth/Throat:      Pharynx: No oropharyngeal exudate or posterior oropharyngeal erythema.   Eyes:      General:         Right eye: No discharge.         Left eye: No discharge.      Conjunctiva/sclera: Conjunctivae normal.      Pupils: Pupils are equal, round, and reactive to light.   Neck:      Thyroid: No thyromegaly.      Vascular: No JVD.   Cardiovascular:      Rate and Rhythm: Normal rate and regular rhythm.      Pulses: Normal pulses.      Heart sounds: Normal heart sounds. No murmur heard.  Pulmonary:      Effort: Pulmonary effort is normal. No respiratory distress.      Breath sounds: Normal breath sounds. No wheezing or rales.   Abdominal:      General: Bowel sounds are normal.      Palpations: Abdomen is soft. There is no mass.      Tenderness: There is no abdominal tenderness.   Musculoskeletal:         General: Deformity (several toes both feet) present. No tenderness. Normal range of motion.      Cervical back: Normal range of motion and neck supple.      Right lower leg: No edema.      Left lower leg: No edema.   Lymphadenopathy:      Cervical: No cervical adenopathy.      Upper Body:      Right upper body: No supraclavicular adenopathy.      Left upper body: No supraclavicular adenopathy.   Skin:     Coloration: Skin is not jaundiced.      Findings: No rash.   Neurological:      General: No focal deficit present.      Mental Status: She is alert and oriented to person, place, and time.      Cranial Nerves: No cranial nerve deficit.      Deep Tendon Reflexes: Reflexes are normal and symmetric.   Psychiatric:         Mood and Affect: Mood normal.  "Mood is not depressed.         Behavior: Behavior normal.         Thought Content: Thought content normal.       Assessment:       Problem List Items Addressed This Visit    None  Visit Diagnoses       Hallux abductovalgus, right        Preop testing                Plan:         Kirti Nagel was seen today for pre-op exam.    Diagnoses and all orders for this visit:    Hallux abductovalgus, right  -     Ambulatory referral/consult to Internal Medicine    Preop testing  -     Ambulatory referral/consult to Internal Medicine             No Aspirin or Aleve or Advil type products. No Fish Oil or Vit E 1 week before.   Labs from her recent physical were reviewed and stable.  Patient is cleared for MAC anesthesia with regional block for podiatry surgery.    Portions of this note may have been created with voice recognition software. Occasional "wrong-word" or "sound-a-like" substitutions may have occurred due to the inherent limitations of voice recognition software. Please, read the note carefully and recognize, using context, where substitutions have occurred.  "

## 2023-04-04 ENCOUNTER — HOSPITAL ENCOUNTER (OUTPATIENT)
Dept: PREADMISSION TESTING | Facility: HOSPITAL | Age: 63
Discharge: HOME | End: 2023-04-04
Attending: PODIATRIST
Payer: COMMERCIAL

## 2023-04-04 ENCOUNTER — ANESTHESIA EVENT (OUTPATIENT)
Dept: SURGERY | Facility: HOSPITAL | Age: 63
End: 2023-04-04
Payer: COMMERCIAL

## 2023-04-04 VITALS
HEART RATE: 63 BPM | WEIGHT: 105.81 LBS | TEMPERATURE: 98 F | RESPIRATION RATE: 16 BRPM | OXYGEN SATURATION: 99 % | BODY MASS INDEX: 19.98 KG/M2 | SYSTOLIC BLOOD PRESSURE: 129 MMHG | DIASTOLIC BLOOD PRESSURE: 61 MMHG | HEIGHT: 61 IN

## 2023-04-04 RX ORDER — SODIUM CHLORIDE, SODIUM LACTATE, POTASSIUM CHLORIDE, CALCIUM CHLORIDE 600; 310; 30; 20 MG/100ML; MG/100ML; MG/100ML; MG/100ML
INJECTION, SOLUTION INTRAVENOUS CONTINUOUS
Status: CANCELLED | OUTPATIENT
Start: 2023-04-04

## 2023-04-04 RX ORDER — LIDOCAINE HYDROCHLORIDE 10 MG/ML
1 INJECTION, SOLUTION EPIDURAL; INFILTRATION; INTRACAUDAL; PERINEURAL ONCE
Status: CANCELLED | OUTPATIENT
Start: 2023-04-04 | End: 2023-04-04

## 2023-04-04 NOTE — DISCHARGE INSTRUCTIONS
Your surgery is scheduled for 4/21/23.    Please report to [facility] on the 1st Floor at 0530 a.m.    THIS TIME IS SUBJECT TO CHANGE.  YOU WILL RECEIVE A PHONE CALL THE DAY BEFORE SURGERY BY 3:30 PM TO CONFIRM YOUR TIME OF ARRIVAL.  IF YOU HAVE NOT RECEIVED A PHONE CALL BY 3:30 PM THE DAY BEFORE YOUR SURGERY PLEASE CALL [phone]     INSTRUCTIONS IMPORTANT!!!  ¨ Do not eat or drink after 12 midnight-including water, candy, gum, & mints. OK to brush teeth.      ____  Proceed to [facility] on *** for additional testing.        ____  Do not wear makeup, including mascara.  ____  No powder, lotions or creams to surgical area.  ____  Please remove all jewelry, including piercings and leave at home.  ____  No money or valuables needed. Please leave at home.  ____  Please bring any documents given by your doctor.  ____  If going home the same day, arrange for a ride home. You will not be able to             drive if Anesthesia was used.  ____  Children under 18 years require a parent / guardian present the entire time             they are in surgery / recovery.  ____  Wear loose fitting clothing. Allow for dressings, bandages.  ____  Stop Aspirin, Ibuprofen, Motrin, Aleve, Goody's/BC powders, Excedrine and Naproxen (NSAIDS) at least 3-5 days before surgery, unless otherwise instructed by your doctor, or the nurse.   You MAY use Tylenol/acetaminophen until day of surgery.  ____  Wash the surgical area with Hibiclens the night before surgery, and again the             morning of surgery.  Be sure to rinse hibiclens off completely (if instructed by   nurse).  ____  If you take diabetic medication, do not take am of surgery unless instructed by Doctor.  ____  Call MD for temperature above 101 degrees or any other signs of infection such as Urinary (bladder) infection, Upper respiratory infection, skin boils, etc.  ____ Stop taking any Fish Oil supplement or any Vitamins that contain Vitamin E at  least 5 days prior to surgery.  ____ Do Not wear your contact lenses the day of your procedure.  You may wear your glasses.      ____Do not shave surgical site for 3 days prior to surgery.  ____ Practice Good hand washing before, during, and after procedure.      I have read or had read and explained to me, and understand the above information.  Additional comments or instructions:  For additional questions call [phone]      ANESTHESIA SIDE EFFECTS  -For the first 24 hours after surgery:  Do not drive, use heavy equipment, make important decisions, or drink alcohol  -It is normal to feel sleepy for several hours.  Rest until you are more awake.  -Have someone stay with you, if needed.  They can watch for problems and help keep you safe.  -Some possible post anesthesia side effects include: nausea and vomiting, sore throat and hoarseness, sleepiness, and dizziness.        Pre-Op Bathing Instructions    Before surgery, you can play an important role in your own health.    Because skin is not sterile, we need to be sure that your skin is as free of germs as possible. By following the instructions below, you can reduce the number of germs on your skin before surgery.    IMPORTANT: You will need to shower with a special soap called Hibiclens*, available at any pharmacy.  If you are allergic to Chlorhexidine (the antiseptic in Hibiclens), use an antibacterial soap such as Dial Soap for your preoperative shower.  You will shower with Hibiclens both the night before your surgery and the morning of your surgery.  Do not use Hibiclens on the head, face or genitals to avoid injury to those areas.    STEP #1: THE NIGHT BEFORE YOUR SURGERY     Do not shave the area of your body where your surgery will be performed.  Shower and wash your hair and body as usual with your normal soap and shampoo.  Rinse your hair and body thoroughly after you shower to remove all soap residue.  With your hand, apply one packet of Hibiclens soap to  the surgical site.   Wash the site gently for five (5) minutes. Do not scrub your skin too hard.   Do not wash with your regular soap after Hibiclens is used.  Rinse your body thoroughly.  Pat yourself dry with a clean, soft towel.  Do not use lotion, cream, or powder.  Wear clean clothes.    STEP #2: THE MORNING OF YOUR SURGERY     Repeat Step #1.    * Not to be used by people allergic to Chlorhexidine.

## 2023-04-04 NOTE — PRE-PROCEDURE INSTRUCTIONS
Hank [phone]    Allergies, medical, surgical, family and psychosocial histories reviewed with patient. Periop plan of care reviewed. Preop instructions given, including medications to take and to hold. Hibiclens soap and instructions on use given. Time allotted for questions to be addressed.  Patient verbalized understanding.

## 2023-04-04 NOTE — ANESTHESIA PREPROCEDURE EVALUATION
"                                                                                                             04/04/2023  Kirti Romero is a 62 y.o., female scheduled for  BUNIONECTOMY, LAPIDUS (Right) and EXCISION, BUNIONETTE (Right: Foot) 4/21/23.    Per internal medicine Dr. Gates, "Patient is cleared for MAC anesthesia with regional block for podiatry surgery".    Past Medical History:   Diagnosis Date    Abnormal Pap smear     Treated with Hysterectomy    Basal cell carcinoma     Breast cyst 03/01/2016    rt breast    Breast disorder      Past Surgical History:   Procedure Laterality Date    AUGMENTATION OF BREAST Bilateral 2003    BREAST BIOPSY Right 03/01/2016    cyst that popped    BREAST SURGERY Bilateral 06/01/2003    COLONOSCOPY N/A 9/22/2020    Procedure: COLONOSCOPY;  Surgeon: Alberto Santos MD;  Location: [facility] ([address] [place] [address]);  Service: Endoscopy;  Laterality: N/A;  COVID test on 9/26/20 at [facility]  PM prep    HYSTERECTOMY  09/1998    full 38    OOPHORECTOMY  1998    TONSILLECTOMY       Current Outpatient Medications   Medication Instructions    CA CITRATE/MGOX/VIT D3/B6/MIN (CALCIUM CITRATE + D WITH MAG ORAL) Oral    cyanocobalamin (VITAMIN B-12) 100 mcg, Oral, Daily    estradioL (ESTRACE) 1 mg, Oral, Daily    hydrOXYzine HCL (ATARAX) 25 MG tablet 1/2 to 1 nightly prn.    levothyroxine (SYNTHROID) 75 MCG tablet Take 1 tablet by mouth once daily    vitamin D (VITAMIN D3) 1,000 Units, Oral, Daily        Patient Active Problem List   Diagnosis    Hypothyroidism       Past Medical History:   Diagnosis Date    Abnormal Pap smear     Treated with Hysterectomy    Basal cell carcinoma     Breast cyst 03/01/2016    rt breast    Breast disorder        ECHO: No results found for this or any previous visit.      Body mass index is 16.46 kg/m².    Tobacco Use: Low Risk     Smoking Tobacco Use: Never    Smokeless Tobacco Use: Never    Passive Exposure: Not on file "       Social History     Substance and Sexual Activity   Drug Use No        Alcohol Use: Not At Risk    Frequency of Alcohol Consumption: Never    Average Number of Drinks: Patient does not drink    Frequency of Binge Drinking: Never       Review of patient's allergies indicates:  No Known Allergies      Airway:  No value filed.   Pre-op Assessment    I have reviewed the Patient Summary Reports.     I have reviewed the Nursing Notes.    I have reviewed the Medications.     Review of Systems  Anesthesia Hx:  No problems with previous Anesthesia   Denies Personal Hx of Anesthesia complications.   Social:  Non-Smoker, No Alcohol Use    Hematology/Oncology:  Hematology Normal        Cardiovascular:  Cardiovascular Normal Exercise tolerance: good   Denies Angina.    Pulmonary:  Pulmonary Normal    Renal/:  Renal/ Normal     Hepatic/GI:  Hepatic/GI Normal    Neurological:  Neurology Normal Denies TIA.  Denies CVA. Denies Seizures.    Endocrine:   Hypothyroidism        Physical Exam  General: Well nourished and Cooperative    Airway:  Mallampati: III / I  Mouth Opening: Normal  TM Distance: Normal  Tongue: Normal  Neck ROM: Normal ROM    Dental:  Intact, Caps / Implants, Retainer  Lower permanent retainer, numerous caps upper and lower  Chest/Lungs:  Clear to auscultation, Normal Respiratory Rate    Heart:  Rate: Normal  Rhythm: Regular Rhythm  Sounds: Normal      Lab Results   Component Value Date    WBC 5.0 08/09/2022    HGB 12.5 08/09/2022    HCT 39.4 08/09/2022     08/09/2022    CHOL 210 (H) 08/09/2022    TRIG 122 08/09/2022    HDL 86 08/09/2022    ALT 14 08/09/2022    AST 21 08/09/2022     08/09/2022    K 4.2 08/09/2022     08/09/2022    CREATININE 0.71 08/09/2022    BUN 13 08/09/2022    CO2 29 08/09/2022    TSH 5.98 (H) 08/09/2022    HGBA1C 5.0 08/09/2022     No results found for this or any previous visit.      Anesthesia Plan  Type of Anesthesia, risks & benefits discussed:    Anesthesia  Type: Gen Natural Airway  Intra-op Monitoring Plan: Standard ASA Monitors  Post Op Pain Control Plan: multimodal analgesia and peripheral nerve block  Induction:  IV  Informed Consent: Informed consent signed with the Patient and all parties understand the risks and agree with anesthesia plan.  All questions answered.   ASA Score: 2  Day of Surgery Review of History & Physical: H&P completed by Anesthesiologist.  Anesthesia Plan Notes: Anesthesia consent to be signed prior to surgery 4/21/23      Ready For Surgery From Anesthesia Perspective.     .

## 2023-04-21 ENCOUNTER — HOSPITAL ENCOUNTER (OUTPATIENT)
Facility: HOSPITAL | Age: 63
Discharge: HOME | End: 2023-04-21
Attending: PODIATRIST | Admitting: PODIATRIST
Payer: COMMERCIAL

## 2023-04-21 ENCOUNTER — ANESTHESIA (OUTPATIENT)
Dept: SURGERY | Facility: HOSPITAL | Age: 63
End: 2023-04-21
Payer: COMMERCIAL

## 2023-04-21 VITALS
RESPIRATION RATE: 18 BRPM | OXYGEN SATURATION: 99 % | HEART RATE: 64 BPM | WEIGHT: 102 LBS | DIASTOLIC BLOOD PRESSURE: 54 MMHG | HEIGHT: 66 IN | BODY MASS INDEX: 16.39 KG/M2 | TEMPERATURE: 98 F | SYSTOLIC BLOOD PRESSURE: 121 MMHG

## 2023-04-21 DIAGNOSIS — M20.11 HALLUX ABDUCTOVALGUS, RIGHT: ICD-10-CM

## 2023-04-21 DIAGNOSIS — M20.5X1 MALLET TOE, RIGHT: ICD-10-CM

## 2023-04-21 DIAGNOSIS — M21.621 TAILOR'S BUNION OF RIGHT FOOT: ICD-10-CM

## 2023-04-21 DIAGNOSIS — Z98.890 POSTOPERATIVE STATE: Primary | ICD-10-CM

## 2023-04-21 PROCEDURE — C1713 ANCHOR/SCREW BN/BN,TIS/BN: HCPCS | Performed by: PODIATRIST

## 2023-04-21 PROCEDURE — 28232 PR INCISION FLEX TOE TENDON: ICD-10-PCS | Mod: 59,T8,, | Performed by: PODIATRIST

## 2023-04-21 PROCEDURE — 37000008 HC ANESTHESIA 1ST 15 MINUTES: Performed by: PODIATRIST

## 2023-04-21 PROCEDURE — 36000708 HC OR TIME LEV III 1ST 15 MIN: Performed by: PODIATRIST

## 2023-04-21 PROCEDURE — 28110 PART REMOVAL OF METATARSAL: CPT | Mod: 51,T9,, | Performed by: PODIATRIST

## 2023-04-21 PROCEDURE — 71000015 HC POSTOP RECOV 1ST HR: Performed by: PODIATRIST

## 2023-04-21 PROCEDURE — 25000003 PHARM REV CODE 250: Performed by: PODIATRIST

## 2023-04-21 PROCEDURE — D9220A PRA ANESTHESIA: ICD-10-PCS | Mod: CRNA,,, | Performed by: NURSE ANESTHETIST, CERTIFIED REGISTERED

## 2023-04-21 PROCEDURE — D9220A PRA ANESTHESIA: Mod: ANES,,, | Performed by: ANESTHESIOLOGY

## 2023-04-21 PROCEDURE — 63600175 PHARM REV CODE 636 W HCPCS: Performed by: NURSE ANESTHETIST, CERTIFIED REGISTERED

## 2023-04-21 PROCEDURE — 64445 NJX AA&/STRD SCIATIC NRV IMG: CPT | Performed by: ANESTHESIOLOGY

## 2023-04-21 PROCEDURE — 28299 COR HLX VLGS DOUBLE OSTEOT: CPT | Mod: RT,,, | Performed by: PODIATRIST

## 2023-04-21 PROCEDURE — 37000009 HC ANESTHESIA EA ADD 15 MINS: Performed by: PODIATRIST

## 2023-04-21 PROCEDURE — 63600175 PHARM REV CODE 636 W HCPCS: Performed by: ANESTHESIOLOGY

## 2023-04-21 PROCEDURE — 64450 PERIPHERAL BLOCK: ICD-10-PCS | Mod: 59,RT,, | Performed by: ANESTHESIOLOGY

## 2023-04-21 PROCEDURE — 28110 PR PART EXCIS 5TH METATARSAL HEAD: ICD-10-PCS | Mod: 51,T9,, | Performed by: PODIATRIST

## 2023-04-21 PROCEDURE — C1769 GUIDE WIRE: HCPCS | Performed by: PODIATRIST

## 2023-04-21 PROCEDURE — 25000003 PHARM REV CODE 250: Performed by: NURSE ANESTHETIST, CERTIFIED REGISTERED

## 2023-04-21 PROCEDURE — 28299 PR CORRECT BUNION,DOUBLE OSTEOTOMY: ICD-10-PCS | Mod: RT,,, | Performed by: PODIATRIST

## 2023-04-21 PROCEDURE — 27201423 OPTIME MED/SURG SUP & DEVICES STERILE SUPPLY: Performed by: PODIATRIST

## 2023-04-21 PROCEDURE — 64447 PERIPHERAL BLOCK: ICD-10-PCS | Mod: 59,RT,, | Performed by: ANESTHESIOLOGY

## 2023-04-21 PROCEDURE — 64450 NJX AA&/STRD OTHER PN/BRANCH: CPT | Mod: 59,RT,, | Performed by: ANESTHESIOLOGY

## 2023-04-21 PROCEDURE — 28232 INCISION OF TOE TENDON: CPT | Mod: 59,T8,, | Performed by: PODIATRIST

## 2023-04-21 PROCEDURE — D9220A PRA ANESTHESIA: Mod: CRNA,,, | Performed by: NURSE ANESTHETIST, CERTIFIED REGISTERED

## 2023-04-21 PROCEDURE — 64447 NJX AA&/STRD FEMORAL NRV IMG: CPT | Mod: 59,RT,, | Performed by: ANESTHESIOLOGY

## 2023-04-21 PROCEDURE — 36000709 HC OR TIME LEV III EA ADD 15 MIN: Performed by: PODIATRIST

## 2023-04-21 PROCEDURE — D9220A PRA ANESTHESIA: ICD-10-PCS | Mod: ANES,,, | Performed by: ANESTHESIOLOGY

## 2023-04-21 PROCEDURE — 27800903 OPTIME MED/SURG SUP & DEVICES OTHER IMPLANTS: Performed by: PODIATRIST

## 2023-04-21 DEVICE — SCREW FASTPITCH LOK 2.7X16MM: Type: IMPLANTABLE DEVICE | Site: FOOT | Status: FUNCTIONAL

## 2023-04-21 DEVICE — MATRIX BONE STIMUBLAST 1ML GEL: Type: IMPLANTABLE DEVICE | Site: FOOT | Status: FUNCTIONAL

## 2023-04-21 DEVICE — SCREW FASTPTCH LOK 2.7X12/14MM: Type: IMPLANTABLE DEVICE | Site: FOOT | Status: FUNCTIONAL

## 2023-04-21 DEVICE — IMPLANTABLE DEVICE: Type: IMPLANTABLE DEVICE | Site: FOOT | Status: FUNCTIONAL

## 2023-04-21 DEVICE — STAPLE DYNANITE NIT 9X10MM: Type: IMPLANTABLE DEVICE | Site: FOOT | Status: FUNCTIONAL

## 2023-04-21 RX ORDER — CEFAZOLIN SODIUM 2 G/50ML
2 SOLUTION INTRAVENOUS
Status: DISCONTINUED | OUTPATIENT
Start: 2023-04-21 | End: 2023-04-21 | Stop reason: HOSPADM

## 2023-04-21 RX ORDER — BUPIVACAINE HYDROCHLORIDE 2.5 MG/ML
INJECTION, SOLUTION EPIDURAL; INFILTRATION; INTRACAUDAL
Status: DISCONTINUED | OUTPATIENT
Start: 2023-04-21 | End: 2023-04-21 | Stop reason: HOSPADM

## 2023-04-21 RX ORDER — ROPIVACAINE HYDROCHLORIDE 5 MG/ML
INJECTION, SOLUTION EPIDURAL; INFILTRATION; PERINEURAL
Status: COMPLETED | OUTPATIENT
Start: 2023-04-21 | End: 2023-04-21

## 2023-04-21 RX ORDER — LIDOCAINE HYDROCHLORIDE 10 MG/ML
1 INJECTION, SOLUTION EPIDURAL; INFILTRATION; INTRACAUDAL; PERINEURAL ONCE
Status: DISCONTINUED | OUTPATIENT
Start: 2023-04-21 | End: 2023-04-21 | Stop reason: HOSPADM

## 2023-04-21 RX ORDER — SODIUM CHLORIDE 0.9 % (FLUSH) 0.9 %
3 SYRINGE (ML) INJECTION
Status: DISCONTINUED | OUTPATIENT
Start: 2023-04-21 | End: 2023-04-21 | Stop reason: HOSPADM

## 2023-04-21 RX ORDER — OXYCODONE AND ACETAMINOPHEN 10; 325 MG/1; MG/1
1 TABLET ORAL EVERY 4 HOURS PRN
Qty: 30 TABLET | Refills: 0 | Status: SHIPPED | OUTPATIENT
Start: 2023-04-21 | End: 2023-07-20

## 2023-04-21 RX ORDER — IBUPROFEN 800 MG/1
800 TABLET ORAL EVERY 6 HOURS PRN
Qty: 30 TABLET | Refills: 1 | Status: SHIPPED | OUTPATIENT
Start: 2023-04-21 | End: 2023-07-20

## 2023-04-21 RX ORDER — LIDOCAINE HYDROCHLORIDE 20 MG/ML
INJECTION INTRAVENOUS
Status: DISCONTINUED | OUTPATIENT
Start: 2023-04-21 | End: 2023-04-21

## 2023-04-21 RX ORDER — EPINEPHRINE 1 MG/ML
INJECTION, SOLUTION, CONCENTRATE INTRAVENOUS
Status: DISCONTINUED
Start: 2023-04-21 | End: 2023-04-21 | Stop reason: HOSPADM

## 2023-04-21 RX ORDER — HYDROMORPHONE HYDROCHLORIDE 2 MG/ML
0.2 INJECTION, SOLUTION INTRAMUSCULAR; INTRAVENOUS; SUBCUTANEOUS EVERY 5 MIN PRN
Status: DISCONTINUED | OUTPATIENT
Start: 2023-04-21 | End: 2023-04-21 | Stop reason: HOSPADM

## 2023-04-21 RX ORDER — SODIUM CHLORIDE, SODIUM LACTATE, POTASSIUM CHLORIDE, CALCIUM CHLORIDE 600; 310; 30; 20 MG/100ML; MG/100ML; MG/100ML; MG/100ML
INJECTION, SOLUTION INTRAVENOUS CONTINUOUS
Status: DISCONTINUED | OUTPATIENT
Start: 2023-04-21 | End: 2023-04-21 | Stop reason: HOSPADM

## 2023-04-21 RX ORDER — PROPOFOL 10 MG/ML
VIAL (ML) INTRAVENOUS CONTINUOUS PRN
Status: DISCONTINUED | OUTPATIENT
Start: 2023-04-21 | End: 2023-04-21

## 2023-04-21 RX ORDER — ONDANSETRON 2 MG/ML
4 INJECTION INTRAMUSCULAR; INTRAVENOUS ONCE AS NEEDED
Status: DISCONTINUED | OUTPATIENT
Start: 2023-04-21 | End: 2023-04-21 | Stop reason: HOSPADM

## 2023-04-21 RX ORDER — HYDROCODONE BITARTRATE AND ACETAMINOPHEN 5; 325 MG/1; MG/1
1 TABLET ORAL EVERY 4 HOURS PRN
Status: DISCONTINUED | OUTPATIENT
Start: 2023-04-21 | End: 2023-04-21 | Stop reason: HOSPADM

## 2023-04-21 RX ORDER — MEPERIDINE HYDROCHLORIDE 50 MG/ML
12.5 INJECTION INTRAMUSCULAR; INTRAVENOUS; SUBCUTANEOUS ONCE AS NEEDED
Status: DISCONTINUED | OUTPATIENT
Start: 2023-04-21 | End: 2023-04-21 | Stop reason: HOSPADM

## 2023-04-21 RX ORDER — MIDAZOLAM HYDROCHLORIDE 1 MG/ML
2 INJECTION INTRAMUSCULAR; INTRAVENOUS
Status: COMPLETED | OUTPATIENT
Start: 2023-04-21 | End: 2023-04-21

## 2023-04-21 RX ORDER — DEXAMETHASONE SODIUM PHOSPHATE 4 MG/ML
INJECTION, SOLUTION INTRA-ARTICULAR; INTRALESIONAL; INTRAMUSCULAR; INTRAVENOUS; SOFT TISSUE
Status: DISCONTINUED | OUTPATIENT
Start: 2023-04-21 | End: 2023-04-21

## 2023-04-21 RX ORDER — LIDOCAINE HYDROCHLORIDE 10 MG/ML
INJECTION INFILTRATION; PERINEURAL
Status: DISCONTINUED | OUTPATIENT
Start: 2023-04-21 | End: 2023-04-21 | Stop reason: HOSPADM

## 2023-04-21 RX ORDER — CEPHALEXIN 500 MG/1
500 CAPSULE ORAL 4 TIMES DAILY
Qty: 28 CAPSULE | Refills: 0 | Status: SHIPPED | OUTPATIENT
Start: 2023-04-21 | End: 2023-07-20

## 2023-04-21 RX ORDER — PHENYLEPHRINE HYDROCHLORIDE 10 MG/ML
INJECTION INTRAVENOUS
Status: DISCONTINUED | OUTPATIENT
Start: 2023-04-21 | End: 2023-04-21

## 2023-04-21 RX ORDER — FENTANYL CITRATE 50 UG/ML
INJECTION, SOLUTION INTRAMUSCULAR; INTRAVENOUS
Status: DISCONTINUED | OUTPATIENT
Start: 2023-04-21 | End: 2023-04-21

## 2023-04-21 RX ORDER — CEFAZOLIN SODIUM 1 G/3ML
INJECTION, POWDER, FOR SOLUTION INTRAMUSCULAR; INTRAVENOUS
Status: DISCONTINUED | OUTPATIENT
Start: 2023-04-21 | End: 2023-04-21

## 2023-04-21 RX ORDER — ROPIVACAINE HYDROCHLORIDE 5 MG/ML
INJECTION, SOLUTION EPIDURAL; INFILTRATION; PERINEURAL
Status: COMPLETED
Start: 2023-04-21 | End: 2023-04-21

## 2023-04-21 RX ADMIN — LIDOCAINE HYDROCHLORIDE 50 MG: 20 INJECTION, SOLUTION INTRAVENOUS at 07:04

## 2023-04-21 RX ADMIN — PROPOFOL 100 MCG/KG/MIN: 10 INJECTION, EMULSION INTRAVENOUS at 07:04

## 2023-04-21 RX ADMIN — PHENYLEPHRINE HYDROCHLORIDE 100 MCG: 10 INJECTION INTRAVENOUS at 08:04

## 2023-04-21 RX ADMIN — GLYCOPYRROLATE 0.2 MG: 0.2 INJECTION, SOLUTION INTRAMUSCULAR; INTRAVITREAL at 08:04

## 2023-04-21 RX ADMIN — ROPIVACAINE HYDROCHLORIDE 10 ML: 5 INJECTION, SOLUTION EPIDURAL; INFILTRATION; PERINEURAL at 06:04

## 2023-04-21 RX ADMIN — FENTANYL CITRATE 50 MCG: 50 INJECTION, SOLUTION INTRAMUSCULAR; INTRAVENOUS at 07:04

## 2023-04-21 RX ADMIN — ROPIVACAINE HYDROCHLORIDE 20 ML: 5 INJECTION, SOLUTION EPIDURAL; INFILTRATION; PERINEURAL at 06:04

## 2023-04-21 RX ADMIN — MIDAZOLAM HYDROCHLORIDE 2 MG: 1 INJECTION, SOLUTION INTRAMUSCULAR; INTRAVENOUS at 06:04

## 2023-04-21 RX ADMIN — SODIUM CHLORIDE, SODIUM LACTATE, POTASSIUM CHLORIDE, AND CALCIUM CHLORIDE: .6; .31; .03; .02 INJECTION, SOLUTION INTRAVENOUS at 07:04

## 2023-04-21 RX ADMIN — PHENYLEPHRINE HYDROCHLORIDE 100 MCG: 10 INJECTION INTRAVENOUS at 07:04

## 2023-04-21 RX ADMIN — FENTANYL CITRATE 25 MCG: 50 INJECTION, SOLUTION INTRAMUSCULAR; INTRAVENOUS at 08:04

## 2023-04-21 RX ADMIN — DEXAMETHASONE SODIUM PHOSPHATE 1 MG: 4 INJECTION, SOLUTION INTRA-ARTICULAR; INTRALESIONAL; INTRAMUSCULAR; INTRAVENOUS; SOFT TISSUE at 06:04

## 2023-04-21 RX ADMIN — FENTANYL CITRATE 25 MCG: 50 INJECTION, SOLUTION INTRAMUSCULAR; INTRAVENOUS at 10:04

## 2023-04-21 RX ADMIN — CEFAZOLIN 2 G: 330 INJECTION, POWDER, FOR SOLUTION INTRAMUSCULAR; INTRAVENOUS at 07:04

## 2023-04-21 NOTE — H&P
"Subjective:      Patient ID: Kirti Romero is a 62 y.o. female.    Chief Complaint: Foot Pain (Surgical consult right foot bunion)    Presents today complaining of progressive bunions on both feet.  Relates that she uses toe separators and has been wearing shoes with more room in the toe box to help accommodate her feet.  No pain reported.    02/27/2023:  Returns following completing her x-ray of both feet for surgical consultation.  No new complaints.    04/21/2023: Returns for surgical intervention right foot. No new complaints.   Vitals:    02/27/23 1004   BP: 114/67   Pulse: 67   Weight: 48.1 kg (106 lb)   Height: 5' 1" (1.549 m)   PainSc: 10-Worst pain ever      Past Medical History:   Diagnosis Date    Abnormal Pap smear     Treated with Hysterectomy    Basal cell carcinoma     Breast cyst 03/01/2016    rt breast    Breast disorder        Past Surgical History:   Procedure Laterality Date    AUGMENTATION OF BREAST Bilateral 2003    BREAST BIOPSY Right 03/01/2016    cyst that popped    BREAST SURGERY Bilateral 06/01/2003    COLONOSCOPY N/A 9/22/2020    Procedure: COLONOSCOPY;  Surgeon: Alberto Santos MD;  [facility] ([address] [place] [address]);  Service: Endoscopy;  Laterality: N/A;  COVID test on 9/26/20 at [facility] prep    HYSTERECTOMY  09/1998    full 38    OOPHORECTOMY  1998    TONSILLECTOMY         Family History   Problem Relation Age of Onset    Diabetes Mother     Stroke Paternal Grandfather     Breast cancer Paternal Grandmother     Amblyopia Neg Hx     Blindness Neg Hx     Cancer Neg Hx     Cataracts Neg Hx     Glaucoma Neg Hx     Hypertension Neg Hx     Macular degeneration Neg Hx     Retinal detachment Neg Hx     Strabismus Neg Hx     Thyroid disease Neg Hx     Melanoma Neg Hx        Social History     Socioeconomic History    Marital status:    Tobacco Use    Smoking status: Never    Smokeless tobacco: Never   Substance and Sexual Activity    Alcohol use: No     Comment: very rarely "    Drug use: No    Sexual activity: Yes     Partners: Male     Comment:      Social Determinants of Health     Financial Resource Strain: Low Risk     Difficulty of Paying Living Expenses: Not hard at all   Food Insecurity: No Food Insecurity    Worried About Running Out of Food in the Last Year: Never true    Ran Out of Food in the Last Year: Never true   Transportation Needs: No Transportation Needs    Lack of Transportation (Medical): No    Lack of Transportation (Non-Medical): No   Physical Activity: Insufficiently Active    Days of Exercise per Week: 4 days    Minutes of Exercise per Session: 10 min   Stress: No Stress Concern Present    Feeling of Stress : Only a little   Social Connections: Unknown    Frequency of Communication with Friends and Family: More than three times a week    Frequency of Social Gatherings with Friends and Family: Once a week    Active Member of Clubs or Organizations: Yes    Attends Club or Organization Meetings: More than 4 times per year    Marital Status:    Housing Stability: Low Risk     Unable to Pay for Housing in the Last Year: No    Number of Places Lived in the Last Year: 1    Unstable Housing in the Last Year: No       Current Outpatient Medications   Medication Sig Dispense Refill    amoxicillin (AMOXIL) 875 MG tablet Take 1 tablet (875 mg total) by mouth every 12 (twelve) hours. for 10 days (Patient not taking: Reported on 2/27/2023) 20 tablet 0    CA CITRATE/MGOX/VIT D3/B6/MIN (CALCIUM CITRATE + D WITH MAG ORAL) Take by mouth.      cyanocobalamin (VITAMIN B-12) 1000 MCG tablet Take 100 mcg by mouth once daily.      estradioL (ESTRACE) 1 MG tablet Take 1 tablet (1 mg total) by mouth once daily. 90 tablet 3    flu vacc [id number] 6mos up,PF, (FLUARIX QUAD [id number], PF,) 60 mcg (15 mcg x 4)/0.5 mL Syrg Inject 0.5ml by [facility] 0.5 mL 0    hydrOXYzine HCL (ATARAX) 25 MG tablet 1/2 to 1 nightly prn. 30 tablet 3    levothyroxine (SYNTHROID) 75 MCG tablet Take 1  tablet by mouth once daily 90 tablet 0    vitamin D (VITAMIN D3) 1000 units Tab Take 1,000 Units by mouth once daily.       Current Facility-Administered Medications   Medication Dose Route Frequency Provider Last Rate Last Admin    sodium chloride 0.9% flush 10 mL  10 mL Intravenous PRN Arthur Alcocer DPM           Review of patient's allergies indicates:  No Known Allergies      Review of Systems   Constitutional: Negative for chills, fever and malaise/fatigue.   HENT:  Negative for congestion and hearing loss.    Cardiovascular:  Negative for chest pain, claudication and leg swelling.   Respiratory:  Negative for cough and shortness of breath.    Musculoskeletal:  Negative for back pain, joint pain, muscle cramps and muscle weakness.   Gastrointestinal:  Negative for nausea and vomiting.   Neurological:  Negative for numbness, paresthesias and weakness.   Psychiatric/Behavioral:  Negative for altered mental status.          Objective:      Physical Exam  Constitutional:       General: She is not in acute distress.     Appearance: Normal appearance. She is not ill-appearing.   Cardiovascular:      Pulses:           Dorsalis pedis pulses are 2+ on the right side and 2+ on the left side.        Posterior tibial pulses are 2+ on the right side and 2+ on the left side.      Comments: No lower extremity edema bilateral.  Skin temp is warm bilateral foot.  No rubor on dependency bilateral foot.  Musculoskeletal:      Comments: Track bound hallux abductovalgus overlapping the 2nd toe bilateral.  First ray dorsal excursion greater 1 cm with most likely hyper mobile 1st ray left foot.  No audible clicking or popping 1 MTP range of motion bilateral which is normal however restricted when loaded.    Semi rigid mild cavus foot structure bilateral foot.  Ankle range of motion appears to be within normal limits.  Subtalar joint range motion somewhat limited.    Reducible adductovarus contracture of the 5th toe with   tailor's bunion of the 5th metatarsal noted bilateral foot.  Semi rigid flexion contracture of the left 4th toe DIPJ.  Reducible flexion adductovarus contracture of the right 4th toe DIPJ.   Skin:     General: Skin is warm.      Capillary Refill: Capillary refill takes less than 2 seconds.      Findings: No ecchymosis or erythema.      Nails: There is no clubbing.   Neurological:      Mental Status: She is alert and oriented to person, place, and time.      Sensory: Sensation is intact.      Motor: Motor function is intact.             Assessment:       Encounter Diagnoses   Name Primary?    Hallux abductovalgus, right Yes    Preop testing     Mallet toe, right     Tailor's bunion of right foot          Plan:       Kirti Nagel was seen today for foot pain.    Diagnoses and all orders for this visit:    Hallux abductovalgus, right  -     Ambulatory referral/consult to Internal Medicine; Future  -     WALKER FOR HOME USE  -     Case Request Operating Room: BUNIONECTOMY, LAPIDUS, EXCISION, BUNIONETTE  -     Full code; Standing  -     Place in Outpatient; Standing  -     Verify informed consent; Standing  -     Verify surgical site; Standing  -     Insert peripheral IV; Standing  -     Full code  -     Insert peripheral IV    Preop testing  -     Ambulatory referral/consult to Internal Medicine; Future  -     WALKER FOR HOME USE    Mallet toe, right  -     WALKER FOR HOME USE  -     Case Request Operating Room: BUNIONECTOMY, LAPIDUS, EXCISION, BUNIONETTE  -     Full code; Standing  -     Place in Outpatient; Standing  -     Verify informed consent; Standing  -     Verify surgical site; Standing  -     Insert peripheral IV; Standing  -     Full code  -     Insert peripheral IV    Tailor's bunion of right foot  -     WALKER FOR HOME USE  -     Case Request Operating Room: BUNIONECTOMY, LAPIDUS, EXCISION, BUNIONETTE  -     Full code; Standing  -     Place in Outpatient; Standing  -     Verify informed consent; Standing  -      Verify surgical site; Standing  -     Insert peripheral IV; Standing  -     Full code  -     Insert peripheral IV    Other orders  -     sodium chloride 0.9% flush 10 mL  -     cefazolin (ANCEF) 2 gram in dextrose 5% 50 mL IVPB (premix)    I counseled the patient on her conditions, their implications and medical management.    Reviewed bilateral foot x-ray focusing initially on the right foot.  1-2 intermetatarsal angle of 13°, hallux valgus angle of 31°, tibial sesamoid position of 3, rounding of the lateral aspect of the 1st metatarsal consistent with frontal plane rotation.  No significant arthritis noted.  Moderate increase in lateral deviation angle of the 5th metatarsal with mild increase of the 4-5 intermetatarsal angle consistent with tailor's bunion.    We briefly discussed continued conservative care versus surgical intervention consisting of 1st metatarsocuneiform joint arthrodesis with muscle tendon balancing of the 1st metatarsophalangeal joint and possible Akin osteotomy of the hallux right foot.  In addition we discussed flexor tenotomy of the 4th toe DIPJ with possible arthroplasty of the DIPJ and tailor's bunionectomy with 5th metatarsal distal metaphyseal osteotomy right foot.  Risks, benefits anticipate postop course discussed in detail.  No guarantees were given or implied.  Patient elected to proceed surgical intervention outlined above.    This procedure has been fully reviewed with the patient and written informed consent has been obtained.    Referred to PCP for medical optimization and risk stratification    Surgical intervention scheduled for the right foot on 04/21/2023 as mac with regional anesthetic.  Prescription dispensed for rolling knee scooter.  Nonweightbearing x2 weeks of the right foot.    RTC 1 week postop or p.r.n. as discussed.     Assisted per Kalin Martinez DPM PGY 2    A portion of this note was generated by voice recognition software and may contain spelling and grammar  errors.    H&P completed on 02/27/2023 has been reviewed, the patient has been examined and:  I concur with the findings and no changes have occurred since H&P was written.    There are no hospital problems to display for this patient.      .

## 2023-04-21 NOTE — ANESTHESIA PROCEDURE NOTES
Peripheral Block    Patient location during procedure: pre-op   Block not for primary anesthetic.  Reason for block: at surgeon's request and post-op pain management   Post-op Pain Location: Right Foot   Start time: 4/21/2023 6:49 AM  Timeout: 4/21/2023 6:47 AM   End time: 4/21/2023 6:51 AM    Staffing  Authorizing Provider: Hank Langley Jr., MD  Performing Provider: Hank Langley Jr., MD    Preanesthetic Checklist  Completed: patient identified, IV checked, site marked, risks and benefits discussed, surgical consent, monitors and equipment checked, pre-op evaluation and timeout performed  Peripheral Block  Patient position: supine  Prep: ChloraPrep  Patient monitoring: heart rate, cardiac monitor, continuous pulse ox, continuous capnometry and frequent blood pressure checks  Block type: popliteal  Laterality: right  Injection technique: single shot  Needle  Needle type: Echogenic   Needle gauge: 21 G  Needle length: 4 in  Needle localization: anatomical landmarks and ultrasound guidance   -ultrasound image captured on disc.  Assessment  Injection assessment: negative aspiration, negative parasthesia and local visualized surrounding nerve  Paresthesia pain: none  Heart rate change: no  Slow fractionated injection: yes  Pain Tolerance: comfortable throughout block and no complaints  Medications:    Medications: ropivacaine (NAROPIN) injection 0.5% - Perineural   20 mL - 4/21/2023 6:51:00 AM    Additional Notes  VSS.  [facility] RN monitoring vitals throughout procedure.  Patient tolerated procedure well.     Additives 1:200k epi, 1 mg decadron

## 2023-04-21 NOTE — ANESTHESIA PROCEDURE NOTES
Peripheral Block    Patient location during procedure: pre-op   Block not for primary anesthetic.  Reason for block: at surgeon's request and post-op pain management   Post-op Pain Location: right foot   Start time: 4/21/2023 6:51 AM  Timeout: 4/21/2023 6:47 AM   End time: 4/21/2023 6:55 AM    Staffing  Authorizing Provider: Hank Langley Jr., MD  Performing Provider: Hank Langley Jr., MD    Preanesthetic Checklist  Completed: patient identified, IV checked, site marked, risks and benefits discussed, surgical consent, monitors and equipment checked, pre-op evaluation and timeout performed  Peripheral Block  Patient position: supine  Prep: ChloraPrep  Patient monitoring: heart rate, cardiac monitor, continuous pulse ox, continuous capnometry and frequent blood pressure checks  Block type: adductor canal  Laterality: right  Injection technique: single shot  Needle  Needle type: Stimuplex   Needle gauge: 21 G  Needle length: 4 in  Needle localization: anatomical landmarks and ultrasound guidance   -ultrasound image captured on disc.  Assessment  Injection assessment: negative aspiration, negative parasthesia and local visualized surrounding nerve  Paresthesia pain: none  Heart rate change: no  Slow fractionated injection: yes    Medications:    Medications: ropivacaine (NAROPIN) injection 0.5% - Perineural   10 mL - 4/21/2023 6:55:00 AM    Additional Notes  VSS.  [facility] RN monitoring vitals throughout procedure.  Patient tolerated procedure well.

## 2023-04-21 NOTE — PLAN OF CARE
Received from OR per stretcher. Monitor applied. VSS. Rouses to voice. See flow sheets.  Family updated per Text system. PO fluids served.

## 2023-04-21 NOTE — TRANSFER OF CARE
"Anesthesia Transfer of Care Note    Patient: Kirti Romero    Procedure(s) Performed: Procedure(s) (LRB):  BUNIONECTOMY, LAPIDUS (Right)  EXCISION, BUNIONETTE (Right)  EXCISION, BUNIONETTE (Right)  TENOTOMY (Right)    Patient location: [facility]    Anesthesia Type: general    Transport from OR: Transported from OR on room air with adequate spontaneous ventilation    Post pain: adequate analgesia    Post assessment: no apparent anesthetic complications and tolerated procedure well    Post vital signs: stable    Level of consciousness: awake    Nausea/Vomiting: no nausea/vomiting    Complications: none    Transfer of care protocol was followed      Last vitals:   Visit Vitals  /66   Pulse 68   Temp 36.8 °C (98.2 °F)   Resp 18   Ht 5' 6" (1.676 m)   Wt 46.3 kg (102 lb)   LMP 06/19/1998   SpO2 100%   Breastfeeding No   BMI 16.46 kg/m²     "

## 2023-04-21 NOTE — ANESTHESIA POSTPROCEDURE EVALUATION
Anesthesia Post Evaluation    Patient: Kirti Romero    Procedure(s) Performed: Procedure(s) (LRB):  BUNIONECTOMY, LAPIDUS (Right)  EXCISION, BUNIONETTE (Right)  EXCISION, BUNIONETTE (Right)  TENOTOMY (Right)    Final Anesthesia Type: general      Patient location during evaluation: PACU  Patient participation: Yes- Able to Participate  Level of consciousness: awake and alert  Post-procedure vital signs: reviewed and stable  Pain management: adequate  Airway patency: patent    PONV status at discharge: No PONV  Anesthetic complications: no      Cardiovascular status: stable  Respiratory status: spontaneous ventilation  Hydration status: euvolemic  Follow-up not needed.          Vitals Value Taken Time   /55 04/21/23 1030   Temp 36.7 °C (98.1 °F) 04/21/23 1018   Pulse 64 04/21/23 1030   Resp 20 04/21/23 1030   SpO2 100 % 04/21/23 1030         No case tracking events are documented in the log.      Pain/Griselda Score: Griselda Score: 9 (4/21/2023 10:30 AM)

## 2023-04-21 NOTE — BRIEF OP NOTE
[place] - Surgery ([facility])  Brief Operative Note    Surgery Date: 4/21/2023     Surgeon(s) and Role:  Panel 1:     * Arthur Alcocer DPM - Primary  Panel 2:     * Arthur Alcocer DPM - Primary    Assisting Surgeon: None    Pre-op Diagnosis:  Hallux abductovalgus, right [M20.11]  Mallet toe, right [M20.5X1]  Tailor's bunion of right foot [M21.621]    Post-op Diagnosis:  Post-Op Diagnosis Codes:     * Hallux abductovalgus, right [M20.11]     * Mallet toe, right [M20.5X1]     * Tailor's bunion of right foot [M21.621]    Procedure(s) (LRB):  BUNIONECTOMY, LAPIDUS (Right) with akin osteotomy right hallux  EXCISION, BUNIONETTE (Right) with fifth metatarsal osteotomy  Flexor TENOTOMY (Right) fourth and fifth toes    Anesthesia: Regional    Operative Findings: rectus alignment of the first and fifth metatarsals postop.    Estimated Blood Loss: 20 mL         Specimens:   Specimen (24h ago, onward)      None              Discharge Note    OUTCOME: Patient tolerated treatment/procedure well without complication and is now ready for discharge.    DISPOSITION: Home or Self Care    FINAL DIAGNOSIS:  Hallux abductovalgus, right    FOLLOWUP: In clinic    DISCHARGE INSTRUCTIONS:    Discharge Procedure Orders   Diet general     Keep surgical extremity elevated   Order Comments: Above heart level.     Ice to affected area   Order Comments: Place around the ankle without the boot on or behind the ankle throughout the day as needed x 2-3 days after surgery     Call MD for:  temperature >100.4     Call MD for:  persistent nausea and vomiting     Call MD for:  severe uncontrolled pain     Call MD for:  difficulty breathing, headache or visual disturbances     Leave dressing on - Keep it clean, dry, and intact until clinic visit   Order Comments: May remove boot at rest.     Weight bearing restrictions (specify)   Order Comments: Permit heel weightbearing right foot with cam boot for transfers only

## 2023-04-21 NOTE — PLAN OF CARE
Eval with Banker for ERM. Disc only eye and needs to watch carefully. Pt notices a change in vision. Meets criteria for discharge. VSS, AAOx3. Tolerating po fluids without nausea. IV discontinued with tip intact.Discharge instructions given. Time allowed for questions. Verbalizes understanding. Home prescriptions received from Pharmacy. Discharged to home in good condition.

## 2023-04-22 NOTE — OP NOTE
[place] - Surgery ([facility])  Operative Note      Date of Procedure: 4/21/2023     Procedure: Procedure(s) (LRB):  BUNIONECTOMY, LAPIDUS (Right)  AKIN OSTEOTOMY RIGHT HALLUX  FLEXOR TENOTOMY RIGHT FOURTH TOE AND RIGHT FIFTH TOE  EXCISION, TAILORS BUNIONETTE WITH FIFTH METATARSAL OSTEOTOMY (Right)    TENOTOMY (Right)     Surgeon(s) and Role:  Panel 1:     * Arthur Alcocer DPM - Primary     * Kalin Soto DPM PGY 2- Assisting Surgeon     * Caio Bonilla MS3/4    Pre-Operative Diagnosis: Hallux abductovalgus, right [M20.11]  Mallet toe, right [M20.5X1]  Tailor's bunion of right foot [M21.621]    Post-Operative Diagnosis: Post-Op Diagnosis Codes:     * Hallux abductovalgus, right [M20.11]     * Mallet toe, right [M20.5X1]     * Tailor's bunion of right foot [M21.621]    Anesthesia: Regional    Operative Findings (including complications, if any):     Description of Technical Procedures: Rectus alignment of the first and fifth metatarsals postop.    The patient was brought to the operating room on a stretcher and was transferred to the OR table in supine position. Anesthesia was induced.  A tourniquet was applied to the right calf. The right lower limb was prepped and draped in a sterile manner. Tourniquet(s) inflated to 250 mmHg.     Ultrasound guided regional popliteal and adductor canal block was done by anesthesia pre-operatively.     Attention was directed to the dorsal right first ray. A skin marker was used to plan a linear dorsal medial incision beginning proximal to the 1st metatarsal base and extending distally along the medial aspect of the EHL tendon. A #15 blade was used to create a controlled depth incision followed by blunt dissection through skin to subcutaneous tissue with care taken to retract and protect neurovascular structures. Superficial veins were ligated as necessary with monopolar Bovie electrocautery. The subcutaneous tissues were  from the deep fascia and a dorsal medial  capsular incision was then preformed followed by subperiosteal dissection, exposing and mobilizing the 1st tarsalmetatarsal joint. The dorsal medial cutaneous nerve was noted to be atrophied and in the middle of the incision therefore the nerve was excised from the surgical field.    Next, attention was directed to the 1st MTPJ.  A joint sparing sequential lateral release was performed incising the fibular suspensory sesamoid ligament, and lateral 1 MTP capsule.     The 3 in 1 positioner with cut guide was temporarily applied. The Lapiplasty Lapidus 1st metatarsal cuneiform fusion system utilized. 1cm controlled depth incision preformed with #15 blade down to subcutaneous tissue, over 2nd metatarsal at the level of the 5th metatarsal head. Blunt dissection via mosquito forceps to bone carried out. Temporary fixation K wire placed at proximal 1st metatarsal shaft directed medial to lateral to 5th metatarsal head. The metatarsal was reduced with the compression clamp inserted, secured, and temporary olive wire fixation medially. Improvement in sesamoid alignment and adequate reduction verified under fluoroscopy. Olive wires were driven into cut guide and the subchondral base of the 1st metatarsal and distal subchondral surface of the medial cuneiform were resected using sagittal saw with 138 blade. Cut guide removed from field, distractor applied to olive wires and 1st TMTJ was distracted. Osteochondral fragments were excised and the joint flushed with NS. Both surfaces were fenestrated with a 2.0 mm drill bit. A ronguer was employed to resect medial and dorsal flares of the 1st metatarsal base. Distractor, compression clamp, and temporary fixation wire were removed and taken off the field. Under fluoroscopic guidance with confirmation of adequate and improved reduction, alignment, and sesamoid position, two crossing olive wires were driven from distal to proximal through the 1st metatarsal base and medial cuneiform.  Two four screw locking plates were placed and temporarily fixated with two olive wires each, one to the 1st TMTJ dorsally and the other over the 1st TMTJ medially. Adequate reduction, alignment, and sesamoid position was verified once more before 2.0 mm under-drill preformed. Eight locking screws were employed in securing the plates. Four screws were hand tightened, two to each plate, at the distal and proximal aspects. After the remaining olive wires were removed and taken off the field, and the repeat for preformed for the other screw holes of each plate. Appropriate hardware placement and reduction of the 1st TMTJ fusion fluoroscopically confirmed.  An interfragmentary screw was placed from the medial base of the first metatarsal to the intercuneiform bone because stress testing of the first metatarsal base showed residual diastasis between the first and second metatarsal bases contributing to recurrence of the intermetatarsal angle. A 4.0 mm partially threaded cancellous screw from [employer] was used.    Using a #15 scalpel an incision was made at the dorsal medial aspect of the 1st MTPJ through the skin down to SQ followed by blunt dissection down to the deep fascia and capsule. A linear capsular incision was made followed by subperiosteal dissection to the expose the dorsal medial eminence which was resected with a rongeur preserving the medial sagittal groove.     Attention was directed to the proximal phalanx where a medial incision was created along the proximal phalanx in a controlled depth incision through the skin down to SQ followed by blunt dissection down to periosteum which was incised in line with the incision followed by subperiosteal dissection.  A closing medially based wedge (Akin) osteotomy was created near the mid aspect of the proximal phalanx and secured with a single staple. Good apposition and compression of the bony fragments as well as rectus 1st MTPJ alignment was noted and confirmed with  intraop fluoroscopy.     Next a longitudinal incision was made over the 5th dorsal metatarsal using 15 blade, and blunt dissection was done using tenotomy scissors, taking care to protect neurovascular and tendon structures.  A saggital saw was used to do an oblique metaphyseal osteotomy with intact proximal lateral hinge from dorsal proximal to plantar distal osteotomy, which was temporarily fixated by K-wire once the capital fragment was translated medially to reduce the intermetatarsal angle and the lateral deviation of the fifth metatarsal.  A  screw was used to fixate 5th met using AO principles, and Sagittal saw was used to smooth out the edges of metatarsal head.     Lastly a [place] blade was used to preformed a flexor tenotomy on the 4th and 5th toes at the DIPJ due to a flexible flexion contracture which was created at the inferior medial aspect of the toe DIPJ region while dorsiflexing the toes, with improvement of contracture visualized.     The surgical site was irrigated with normal saline solution via bulb syringe.  The surgical site was dressed with xeroform, 4x4 gauze, cast padding, and ACE wrap. Tourniquet(s) deflated.       The patient was transferred to the recovery room with vital signs stable. Following a period of post op monitoring, the patient will be discharged home with the following postop instructions:   1. Keep dressing clean, dry, and intact until next seen by podiatry.   2. Weightbearing status: partial weightbearing.   3. All necessary prescriptions were ordered and medical management will continue.   4. Contact podiatry with any postop questions or concerns.       Estimated Blood Loss (EBL): 20 mL           Implants:   Implant Name Type Inv. Item Serial No.  Lot No. LRB No. Used Action   [id number]  MATRIX BONE STIMUBLAST 1ML GEL [id number] ARTHREX [id number] Right 1 Implanted   lapiplasty mini-incision system    [employer] [id number] Right 1 Implanted   SCREW  FASTPTCH HARSH 2.7X12/14MM - [id number]  SCREW FASTPTCH HARSH 2.7X12/14MM  [employer] [id number] Right 1 Implanted   SCREW FASTPITCH HARSH 2.7X16MM - [id number]  SCREW FASTPITCH HARSH 2.7X16MM  [employer] [id number] Right 1 Implanted   lapiplasty 4.0mm interfrag screw    [id number] Right 1 Implanted   STAPLE DYNANITE NIT 9X10MM - [id number]  STAPLE DYNANITE NIT 9X10MM  ARTHREX [id number] Right 1 Implanted   SCREW COMPR FT 2.5 MICRO 13MM - [id number]  SCREW COMPR FT 2.5 MICRO 13MM  ARTHREX  Right 1 Implanted       Specimens:   Specimen (24h ago, onward)      None                    Condition: Good    Disposition: PACU - hemodynamically stable.    Attestation: I was present and scrubbed for the entire procedure.    Discharge Note    OUTCOME: Patient tolerated treatment/procedure well without complication and is now ready for discharge.    DISPOSITION: Home or Self Care    FINAL DIAGNOSIS:  Hallux abductovalgus, right    FOLLOWUP: In clinic    DISCHARGE INSTRUCTIONS:    Discharge Procedure Orders   Diet general     Keep surgical extremity elevated   Order Comments: Above heart level.     Ice to affected area   Order Comments: Place around the ankle without the boot on or behind the ankle throughout the day as needed x 2-3 days after surgery     Call MD for:  temperature >100.4     Call MD for:  persistent nausea and vomiting     Call MD for:  severe uncontrolled pain     Call MD for:  difficulty breathing, headache or visual disturbances     Leave dressing on - Keep it clean, dry, and intact until clinic visit   Order Comments: May remove boot at rest.     Weight bearing restrictions (specify)   Order Comments: Permit heel weightbearing right foot with cam boot for transfers only     I directly supervised the above resident and was scrubbed for the entire surgical case.  Arhtur Alcocer DPM, FACFAS

## 2023-04-24 ENCOUNTER — PATIENT MESSAGE (OUTPATIENT)
Dept: PODIATRY | Facility: CLINIC | Age: 63
End: 2023-04-24
Payer: COMMERCIAL

## 2023-04-24 ENCOUNTER — TELEPHONE (OUTPATIENT)
Dept: PODIATRY | Facility: CLINIC | Age: 63
End: 2023-04-24
Payer: COMMERCIAL

## 2023-04-27 ENCOUNTER — OFFICE VISIT (OUTPATIENT)
Dept: PODIATRY | Facility: CLINIC | Age: 63
End: 2023-04-27
Payer: COMMERCIAL

## 2023-04-27 VITALS
SYSTOLIC BLOOD PRESSURE: 122 MMHG | HEIGHT: 66 IN | WEIGHT: 102 LBS | BODY MASS INDEX: 16.39 KG/M2 | DIASTOLIC BLOOD PRESSURE: 70 MMHG

## 2023-04-27 DIAGNOSIS — Z98.890 POSTOPERATIVE STATE: Primary | ICD-10-CM

## 2023-04-27 PROCEDURE — 1159F MED LIST DOCD IN RCRD: CPT | Mod: CPTII,S$GLB,, | Performed by: PODIATRIST

## 2023-04-27 PROCEDURE — 3008F PR BODY MASS INDEX (BMI) DOCUMENTED: ICD-10-PCS | Mod: CPTII,S$GLB,, | Performed by: PODIATRIST

## 2023-04-27 PROCEDURE — 99999 PR PBB SHADOW E&M-EST. PATIENT-LVL IV: CPT | Mod: PBBFAC,,, | Performed by: PODIATRIST

## 2023-04-27 PROCEDURE — 1160F PR REVIEW ALL MEDS BY PRESCRIBER/CLIN PHARMACIST DOCUMENTED: ICD-10-PCS | Mod: CPTII,S$GLB,, | Performed by: PODIATRIST

## 2023-04-27 PROCEDURE — 1159F PR MEDICATION LIST DOCUMENTED IN MEDICAL RECORD: ICD-10-PCS | Mod: CPTII,S$GLB,, | Performed by: PODIATRIST

## 2023-04-27 PROCEDURE — 3078F PR MOST RECENT DIASTOLIC BLOOD PRESSURE < 80 MM HG: ICD-10-PCS | Mod: CPTII,S$GLB,, | Performed by: PODIATRIST

## 2023-04-27 PROCEDURE — 3074F SYST BP LT 130 MM HG: CPT | Mod: CPTII,S$GLB,, | Performed by: PODIATRIST

## 2023-04-27 PROCEDURE — 3078F DIAST BP <80 MM HG: CPT | Mod: CPTII,S$GLB,, | Performed by: PODIATRIST

## 2023-04-27 PROCEDURE — 99024 PR POST-OP FOLLOW-UP VISIT: ICD-10-PCS | Mod: S$GLB,,, | Performed by: PODIATRIST

## 2023-04-27 PROCEDURE — 99999 PR PBB SHADOW E&M-EST. PATIENT-LVL IV: ICD-10-PCS | Mod: PBBFAC,,, | Performed by: PODIATRIST

## 2023-04-27 PROCEDURE — 99024 POSTOP FOLLOW-UP VISIT: CPT | Mod: S$GLB,,, | Performed by: PODIATRIST

## 2023-04-27 PROCEDURE — 1160F RVW MEDS BY RX/DR IN RCRD: CPT | Mod: CPTII,S$GLB,, | Performed by: PODIATRIST

## 2023-04-27 PROCEDURE — 3074F PR MOST RECENT SYSTOLIC BLOOD PRESSURE < 130 MM HG: ICD-10-PCS | Mod: CPTII,S$GLB,, | Performed by: PODIATRIST

## 2023-04-27 PROCEDURE — 3008F BODY MASS INDEX DOCD: CPT | Mod: CPTII,S$GLB,, | Performed by: PODIATRIST

## 2023-04-27 NOTE — PATIENT INSTRUCTIONS
Shower as discussed however avoid submerging the foot directly into water.    Begin partial weight-bearing with the orthopedic boot in 1 week and slowly increase the amount of time and pressure applied to the foot over time..

## 2023-04-27 NOTE — PROGRESS NOTES
"Subjective:      Patient ID: Kirti Romero is a 62 y.o. female.    Chief Complaint: Post-op Evaluation (1 week post op)    Presents today complaining of progressive bunions on both feet.  Relates that she uses toe separators and has been wearing shoes with more room in the toe box to help accommodate her feet.  No pain reported.    02/27/2023:  Returns following completing her x-ray of both feet for surgical consultation.  No new complaints.    04/27/2023:  Post Lapidus bunionectomy with Taran osteotomy, flexor tenotomy 4th toe and tailor's bunionectomy 5th metatarsal with flexor tenotomy 5th toe right foot on 04/21/2023.  Doing well.  No significant pain reported.  States that the dressing did caused some irritation along the front of the ankle.  Dressing has remained intact.  Nonweightbearing to the right lower extremity with Cam boot and rolling knee scooter.  Accompanied by her .    Vitals:    04/27/23 1117   BP: 122/70   Weight: 46.3 kg (102 lb)   Height: 5' 6" (1.676 m)   PainSc: 0-No pain      Past Medical History:   Diagnosis Date    Abnormal Pap smear     Treated with Hysterectomy    Basal cell carcinoma     Breast cyst 03/01/2016    rt breast    Breast disorder        Past Surgical History:   Procedure Laterality Date    AUGMENTATION OF BREAST Bilateral 2003    BREAST BIOPSY Right 03/01/2016    cyst that popped    BREAST SURGERY Bilateral 06/01/2003    COLONOSCOPY N/A 9/22/2020    Procedure: COLONOSCOPY;  Surgeon: Alberto Santos MD;  Location: [address]);  Service: Endoscopy;  Laterality: N/A;  COVID test on 9/26/20 at [facility] prep    HYSTERECTOMY  09/1998    full 38    LAPIDUS BUNIONECTOMY Right 4/21/2023    Procedure: BUNIONECTOMY, LAPIDUS;  Surgeon: Arthur Alcocer DPM;  Location: [facility];  Service: Podiatry;  Laterality: Right;  mini c-arm, Lapiplasty set(Sal) notified cc  bunionectomy samantha notified cc    OOPHORECTOMY  1998    MARGARET BUNIONECTOMY Right " 4/21/2023    Procedure: EXCISION, BUNIONETTE;  Surgeon: Arthur Alcocer DPM;  Location: [facility] OR;  Service: Podiatry;  Laterality: Right;  5th toe    MARGARET BUNIONECTOMY Right 4/21/2023    Procedure: EXCISION, BUNIONETTE;  Surgeon: Arthur Alcocer DPM;  Location: [facility] OR;  Service: Podiatry;  Laterality: Right;  Arthrex screws    TENOTOMY Right 4/21/2023    Procedure: TENOTOMY;  Surgeon: Arthur Alcocer DPM;  Location: [facility] OR;  Service: Podiatry;  Laterality: Right;  4th toe    TONSILLECTOMY         Family History   Problem Relation Age of Onset    Diabetes Mother     Stroke Paternal Grandfather     Breast cancer Paternal Grandmother     Amblyopia Neg Hx     Blindness Neg Hx     Cancer Neg Hx     Cataracts Neg Hx     Glaucoma Neg Hx     Hypertension Neg Hx     Macular degeneration Neg Hx     Retinal detachment Neg Hx     Strabismus Neg Hx     Thyroid disease Neg Hx     Melanoma Neg Hx        Social History     Socioeconomic History    Marital status:    Tobacco Use    Smoking status: Never    Smokeless tobacco: Never   Substance and Sexual Activity    Alcohol use: No     Comment: very rarely    Drug use: No    Sexual activity: Yes     Partners: Male     Comment:      Social Determinants of Health     Financial Resource Strain: Low Risk     Difficulty of Paying Living Expenses: Not hard at all   Food Insecurity: No Food Insecurity    Worried About Running Out of Food in the Last Year: Never true    Ran Out of Food in the Last Year: Never true   Transportation Needs: No Transportation Needs    Lack of Transportation (Medical): No    Lack of Transportation (Non-Medical): No   Physical Activity: Insufficiently Active    Days of Exercise per Week: 2 days    Minutes of Exercise per Session: 20 min   Stress: No Stress Concern Present    Feeling of Stress : Only a little   Social Connections: Unknown    Frequency of Communication with Friends and Family: More than three times a week    Frequency of  Social Gatherings with Friends and Family: Twice a week    Active Member of Clubs or Organizations: Yes    Attends Club or Organization Meetings: 1 to 4 times per year    Marital Status:    Housing Stability: Low Risk     Unable to Pay for Housing in the Last Year: No    Number of Places Lived in the Last Year: 1    Unstable Housing in the Last Year: No       Current Outpatient Medications   Medication Sig Dispense Refill    CA CITRATE/MGOX/VIT D3/B6/MIN (CALCIUM CITRATE + D WITH MAG ORAL) Take by mouth.      cephALEXin (KEFLEX) 500 MG capsule Take 1 capsule (500 mg total) by mouth 4 (four) times daily. 28 capsule 0    cyanocobalamin (VITAMIN B-12) 1000 MCG tablet Take 100 mcg by mouth once daily.      estradioL (ESTRACE) 1 MG tablet Take 1 tablet (1 mg total) by mouth once daily. 90 tablet 3    hydrOXYzine HCL (ATARAX) 25 MG tablet 1/2 to 1 nightly prn. 30 tablet 3    ibuprofen (ADVIL,MOTRIN) 800 MG tablet Take 1 tablet (800 mg total) by mouth every 6 (six) hours as needed for Pain. 30 tablet 1    levothyroxine (SYNTHROID) 75 MCG tablet Take 1 tablet by mouth once daily 90 tablet 0    oxyCODONE-acetaminophen (PERCOCET)  mg per tablet Take 1 tablet by mouth every 4 (four) hours as needed for Pain. 30 tablet 0    vitamin D (VITAMIN D3) 1000 units Tab Take 1,000 Units by mouth once daily.       Current Facility-Administered Medications   Medication Dose Route Frequency Provider Last Rate Last Admin    sodium chloride 0.9% flush 10 mL  10 mL Intravenous PRN Arthur Alcocer DPM           Review of patient's allergies indicates:  No Known Allergies      Review of Systems   Constitutional: Negative for chills, fever and malaise/fatigue.   HENT:  Negative for congestion and hearing loss.    Cardiovascular:  Negative for chest pain, claudication and leg swelling.   Respiratory:  Negative for cough and shortness of breath.    Musculoskeletal:  Negative for back pain, joint pain, muscle cramps and muscle  weakness.   Gastrointestinal:  Negative for nausea and vomiting.   Neurological:  Negative for numbness, paresthesias and weakness.   Psychiatric/Behavioral:  Negative for altered mental status.          Objective:      Physical Exam  Constitutional:       General: She is not in acute distress.     Appearance: Normal appearance. She is not ill-appearing.   Cardiovascular:      Pulses:           Dorsalis pedis pulses are 2+ on the right side and 2+ on the left side.        Posterior tibial pulses are 2+ on the right side and 2+ on the left side.      Comments: No lower extremity edema bilateral.  Skin temp is warm bilateral foot.  No rubor on dependency bilateral foot.  Musculoskeletal:      Comments: Rectus alignment to the right hallux, 4th and 5th toes right foot.  Previous tailor's bunion is resolved.  Mild localized pain on palpation to surgical sites.  No palpable fluctuance or crepitance right foot.    Semi rigid mild cavus foot structure bilateral foot.  Ankle range of motion appears to be within normal limits.  Subtalar joint range motion somewhat limited.    Reducible adductovarus contracture of the 5th toe with  tailor's bunion of the 5th metatarsal noted bilateral foot.  Semi rigid flexion contracture of the left 4th toe DIPJ.  Reducible flexion adductovarus contracture of the right 4th toe DIPJ.   Skin:     General: Skin is warm.      Capillary Refill: Capillary refill takes less than 2 seconds.      Findings: No ecchymosis or erythema.      Nails: There is no clubbing.      Comments: Incision sites are well approximated with suture.  No dehiscence or signs infection.  Mild ecchymosis along the dorsal central midfoot and anterior ankle secondary to irritation from the dressing.   Neurological:      Mental Status: She is alert and oriented to person, place, and time.      Sensory: Sensation is intact.      Motor: Motor function is intact.             Assessment:       Encounter Diagnosis   Name Primary?     Postoperative state Yes         Plan:       Kirti Nagel was seen today for post-op evaluation.    Diagnoses and all orders for this visit:    Postoperative state  -     X-Ray Foot Complete Right; Future  -     X-Ray Foot Complete Right; Future      I counseled the patient on her conditions, their implications and medical management.    Dressing right foot removed.  Right foot cleansed Hibiclens scrub.  Applied Mepilex border and Tubigrip F to the right foot.  Home care instructions reviewed in detail.    Patient is permitted to shower as discussed.  Avoid submerging the foot directly into water.      Transition to partial weight-bearing with Cam boot as tolerated using walker or crutches in 1 week followed by gradual increase in pressure to the right foot using the boot.  Patient is permitted to be full weight-bearing to the right foot with the Cam boot within 2 weeks.      Continue strict elevation at rest.      Reviewed 1 MTP range of motion exercises to be performed daily.      RTC within 2 weeks for suture removal or p.r.n. as discussed.     Assisted per Kalin Soto DPM PGY 2    A portion of this note was generated by voice recognition software and may contain spelling and grammar errors.      .

## 2023-05-11 ENCOUNTER — OFFICE VISIT (OUTPATIENT)
Dept: PODIATRY | Facility: CLINIC | Age: 63
End: 2023-05-11
Payer: COMMERCIAL

## 2023-05-11 ENCOUNTER — HOSPITAL ENCOUNTER (OUTPATIENT)
Dept: RADIOLOGY | Facility: HOSPITAL | Age: 63
Discharge: HOME | End: 2023-05-11
Attending: PODIATRIST
Payer: COMMERCIAL

## 2023-05-11 VITALS
HEIGHT: 66 IN | DIASTOLIC BLOOD PRESSURE: 62 MMHG | SYSTOLIC BLOOD PRESSURE: 130 MMHG | WEIGHT: 102 LBS | BODY MASS INDEX: 16.39 KG/M2 | HEART RATE: 70 BPM

## 2023-05-11 DIAGNOSIS — Z98.890 POSTOPERATIVE STATE: ICD-10-CM

## 2023-05-11 DIAGNOSIS — Z98.890 POSTOPERATIVE STATE: Primary | ICD-10-CM

## 2023-05-11 PROCEDURE — 3008F PR BODY MASS INDEX (BMI) DOCUMENTED: ICD-10-PCS | Mod: CPTII,S$GLB,, | Performed by: PODIATRIST

## 2023-05-11 PROCEDURE — 73630 X-RAY EXAM OF FOOT: CPT | Mod: TC,FY,RT

## 2023-05-11 PROCEDURE — 3078F PR MOST RECENT DIASTOLIC BLOOD PRESSURE < 80 MM HG: ICD-10-PCS | Mod: CPTII,S$GLB,, | Performed by: PODIATRIST

## 2023-05-11 PROCEDURE — 99999 PR PBB SHADOW E&M-EST. PATIENT-LVL IV: ICD-10-PCS | Mod: PBBFAC,,, | Performed by: PODIATRIST

## 2023-05-11 PROCEDURE — 99024 POSTOP FOLLOW-UP VISIT: CPT | Mod: S$GLB,,, | Performed by: PODIATRIST

## 2023-05-11 PROCEDURE — 99024 PR POST-OP FOLLOW-UP VISIT: ICD-10-PCS | Mod: S$GLB,,, | Performed by: PODIATRIST

## 2023-05-11 PROCEDURE — 1160F PR REVIEW ALL MEDS BY PRESCRIBER/CLIN PHARMACIST DOCUMENTED: ICD-10-PCS | Mod: CPTII,S$GLB,, | Performed by: PODIATRIST

## 2023-05-11 PROCEDURE — 1160F RVW MEDS BY RX/DR IN RCRD: CPT | Mod: CPTII,S$GLB,, | Performed by: PODIATRIST

## 2023-05-11 PROCEDURE — 99999 PR PBB SHADOW E&M-EST. PATIENT-LVL IV: CPT | Mod: PBBFAC,,, | Performed by: PODIATRIST

## 2023-05-11 PROCEDURE — 1159F PR MEDICATION LIST DOCUMENTED IN MEDICAL RECORD: ICD-10-PCS | Mod: CPTII,S$GLB,, | Performed by: PODIATRIST

## 2023-05-11 PROCEDURE — 3075F PR MOST RECENT SYSTOLIC BLOOD PRESS GE 130-139MM HG: ICD-10-PCS | Mod: CPTII,S$GLB,, | Performed by: PODIATRIST

## 2023-05-11 PROCEDURE — 1159F MED LIST DOCD IN RCRD: CPT | Mod: CPTII,S$GLB,, | Performed by: PODIATRIST

## 2023-05-11 PROCEDURE — 73630 X-RAY EXAM OF FOOT: CPT | Mod: 26,RT,, | Performed by: RADIOLOGY

## 2023-05-11 PROCEDURE — 3078F DIAST BP <80 MM HG: CPT | Mod: CPTII,S$GLB,, | Performed by: PODIATRIST

## 2023-05-11 PROCEDURE — 3008F BODY MASS INDEX DOCD: CPT | Mod: CPTII,S$GLB,, | Performed by: PODIATRIST

## 2023-05-11 PROCEDURE — 73630 XR FOOT COMPLETE 3 VIEW RIGHT: ICD-10-PCS | Mod: 26,RT,, | Performed by: RADIOLOGY

## 2023-05-11 PROCEDURE — 3075F SYST BP GE 130 - 139MM HG: CPT | Mod: CPTII,S$GLB,, | Performed by: PODIATRIST

## 2023-05-11 NOTE — PROGRESS NOTES
"Subjective:      Patient ID: Kirti Romero is a 62 y.o. female.    Chief Complaint: Post-op Evaluation (3 week post op)    Presents today complaining of progressive bunions on both feet.  Relates that she uses toe separators and has been wearing shoes with more room in the toe box to help accommodate her feet.  No pain reported.    02/27/2023:  Returns following completing her x-ray of both feet for surgical consultation.  No new complaints.    04/27/2023:  Post Lapidus bunionectomy with Taran osteotomy, flexor tenotomy 4th toe and tailor's bunionectomy 5th metatarsal with flexor tenotomy 5th toe right foot on 04/21/2023.  Doing well.  No significant pain reported.  States that the dressing did caused some irritation along the front of the ankle.  Dressing has remained intact.  Nonweightbearing to the right lower extremity with Cam boot and rolling knee scooter.  Accompanied by her .    05/11/2023: 3 weeks postop.   No pain reported.  She is attempted to place some weight down however has some anxiety.  She is using a rolling knee scooter.    Vitals:    05/11/23 1309   BP: 130/62   Pulse: 70   Weight: 46.3 kg (102 lb)   Height: 5' 6" (1.676 m)   PainSc: 0-No pain      Past Medical History:   Diagnosis Date    Abnormal Pap smear     Treated with Hysterectomy    Basal cell carcinoma     Breast cyst 03/01/2016    rt breast    Breast disorder        Past Surgical History:   Procedure Laterality Date    AUGMENTATION OF BREAST Bilateral 2003    BREAST BIOPSY Right 03/01/2016    cyst that popped    BREAST SURGERY Bilateral 06/01/2003    COLONOSCOPY N/A 9/22/2020    Procedure: COLONOSCOPY;  Surgeon: Alberto Santos MD;  Location: [address]);  Service: Endoscopy;  Laterality: N/A;  COVID test on 9/26/20 at [facility] [place]    HYSTERECTOMY  09/1998    full 38    LAPIDUS BUNIONECTOMY Right 4/21/2023    Procedure: BUNIONECTOMY, LAPIDUS;  Surgeon: Arthur Alcocer DPM;  Location: [facility] OR;  " Service: Podiatry;  Laterality: Right;  mini c-arm, Lapiplasty set(Sal) notified cc  bunionectomy samantha notified cc    OOPHORECTOMY  1998    MARGARET BUNIONECTOMY Right 4/21/2023    Procedure: EXCISION, BUNIONETTE;  Surgeon: Arthur Alcocer DPM;  Location: [facility] OR;  Service: Podiatry;  Laterality: Right;  5th toe    MARGARET BUNIONECTOMY Right 4/21/2023    Procedure: EXCISION, BUNIONETTE;  Surgeon: Arthur Alcocer DPM;  Location: [facility] OR;  Service: Podiatry;  Laterality: Right;  Arthrex screws    TENOTOMY Right 4/21/2023    Procedure: TENOTOMY;  Surgeon: Arthur Alcocer DPM;  Location: [facility] OR;  Service: Podiatry;  Laterality: Right;  4th toe    TONSILLECTOMY         Family History   Problem Relation Age of Onset    Diabetes Mother     Stroke Paternal Grandfather     Breast cancer Paternal Grandmother     Amblyopia Neg Hx     Blindness Neg Hx     Cancer Neg Hx     Cataracts Neg Hx     Glaucoma Neg Hx     Hypertension Neg Hx     Macular degeneration Neg Hx     Retinal detachment Neg Hx     Strabismus Neg Hx     Thyroid disease Neg Hx     Melanoma Neg Hx        Social History     Socioeconomic History    Marital status:    Tobacco Use    Smoking status: Never    Smokeless tobacco: Never   Substance and Sexual Activity    Alcohol use: No     Comment: very rarely    Drug use: No    Sexual activity: Yes     Partners: Male     Comment:      Social Determinants of Health     Financial Resource Strain: Low Risk     Difficulty of Paying Living Expenses: Not hard at all   Food Insecurity: No Food Insecurity    Worried About Running Out of Food in the Last Year: Never true    Ran Out of Food in the Last Year: Never true   Transportation Needs: No Transportation Needs    Lack of Transportation (Medical): No    Lack of Transportation (Non-Medical): No   Physical Activity: Insufficiently Active    Days of Exercise per Week: 2 days    Minutes of Exercise per Session: 20 min   Stress: No Stress Concern  Present    Feeling of Stress : Only a little   Social Connections: Unknown    Frequency of Communication with Friends and Family: More than three times a week    Frequency of Social Gatherings with Friends and Family: Twice a week    Active Member of Clubs or Organizations: Yes    Attends Club or Organization Meetings: 1 to 4 times per year    Marital Status:    Housing Stability: Low Risk     Unable to Pay for Housing in the Last Year: No    Number of Places Lived in the Last Year: 1    Unstable Housing in the Last Year: No       Current Outpatient Medications   Medication Sig Dispense Refill    CA CITRATE/MGOX/VIT D3/B6/MIN (CALCIUM CITRATE + D WITH MAG ORAL) Take by mouth.      cyanocobalamin (VITAMIN B-12) 1000 MCG tablet Take 100 mcg by mouth once daily.      estradioL (ESTRACE) 1 MG tablet Take 1 tablet (1 mg total) by mouth once daily. 90 tablet 3    hydrOXYzine HCL (ATARAX) 25 MG tablet 1/2 to 1 nightly prn. 30 tablet 3    ibuprofen (ADVIL,MOTRIN) 800 MG tablet Take 1 tablet (800 mg total) by mouth every 6 (six) hours as needed for Pain. 30 tablet 1    levothyroxine (SYNTHROID) 75 MCG tablet Take 1 tablet by mouth once daily 90 tablet 0    vitamin D (VITAMIN D3) 1000 units Tab Take 1,000 Units by mouth once daily.      cephALEXin (KEFLEX) 500 MG capsule Take 1 capsule (500 mg total) by mouth 4 (four) times daily. (Patient not taking: Reported on 5/11/2023) 28 capsule 0    oxyCODONE-acetaminophen (PERCOCET)  mg per tablet Take 1 tablet by mouth every 4 (four) hours as needed for Pain. (Patient not taking: Reported on 5/11/2023) 30 tablet 0     Current Facility-Administered Medications   Medication Dose Route Frequency Provider Last Rate Last Admin    sodium chloride 0.9% flush 10 mL  10 mL Intravenous PRN Arthur Alcocer DPM           Review of patient's allergies indicates:  No Known Allergies      Review of Systems   Constitutional: Negative for chills, fever and malaise/fatigue.   HENT:   Negative for congestion and hearing loss.    Cardiovascular:  Negative for chest pain, claudication and leg swelling.   Respiratory:  Negative for cough and shortness of breath.    Musculoskeletal:  Negative for back pain, joint pain, muscle cramps and muscle weakness.   Gastrointestinal:  Negative for nausea and vomiting.   Neurological:  Negative for numbness, paresthesias and weakness.   Psychiatric/Behavioral:  Negative for altered mental status.          Objective:      Physical Exam  Constitutional:       General: She is not in acute distress.     Appearance: Normal appearance. She is not ill-appearing.   Cardiovascular:      Pulses:           Dorsalis pedis pulses are 2+ on the right side and 2+ on the left side.        Posterior tibial pulses are 2+ on the right side and 2+ on the left side.      Comments: No lower extremity edema bilateral.  Skin temp is warm bilateral foot.  No rubor on dependency bilateral foot.  Musculoskeletal:      Comments: Rectus alignment to the right hallux, 4th and 5th toes right foot.  Previous tailor's bunion is resolved.    No significant pain on palpation to surgical sites right foot.  No palpable fluctuance or crepitance right foot. 1 MTP range motion with 30-40 degrees of dorsiflexion and 0° of plantar flexion without pain.  No audible crepitus with 1 MTP range motion.    Semi rigid mild cavus foot structure bilateral foot.  Ankle range of motion appears to be within normal limits.  Subtalar joint range motion somewhat limited.    Reducible adductovarus contracture of the 5th toe with  tailor's bunion of the 5th metatarsal noted bilateral foot.  Semi rigid flexion contracture of the left 4th toe DIPJ.  Reducible flexion adductovarus contracture of the right 4th toe DIPJ.   Skin:     General: Skin is warm.      Capillary Refill: Capillary refill takes less than 2 seconds.      Findings: No ecchymosis or erythema.      Nails: There is no clubbing.      Comments: Incision sites  are well approximated with suture  And healed.  No dehiscence or signs infection.   Previous ecchymosis resolved.   Neurological:      Mental Status: She is alert and oriented to person, place, and time.      Sensory: Sensation is intact.      Motor: Motor function is intact.             Assessment:       Encounter Diagnosis   Name Primary?    Postoperative state Yes         Plan:       Kirti Nagel was seen today for post-op evaluation.    Diagnoses and all orders for this visit:    Postoperative state  -     X-Ray Foot Complete Right; Future      I counseled the patient on her conditions, their implications and medical management.      Sutures removed.  Skin incisions healed.      Patient is to transition to weight-bearing as tolerated with Cam boot.  Within 3-4 weeks she may transition slowly out of her tennis shoe with 30 minutes daily increments.  Reviewed 1 MTP range motion exercises to be performed daily.      RTC within 1 month for follow-up weight-bearing x-ray or p.r.n. as discussed.    A portion of this note was generated by voice recognition software and may contain spelling and grammar errors.      .

## 2023-06-13 ENCOUNTER — OFFICE VISIT (OUTPATIENT)
Dept: PODIATRY | Facility: CLINIC | Age: 63
End: 2023-06-13
Payer: COMMERCIAL

## 2023-06-13 ENCOUNTER — HOSPITAL ENCOUNTER (OUTPATIENT)
Dept: RADIOLOGY | Facility: HOSPITAL | Age: 63
Discharge: HOME | End: 2023-06-13
Attending: PODIATRIST
Payer: COMMERCIAL

## 2023-06-13 VITALS
HEART RATE: 76 BPM | BODY MASS INDEX: 16.39 KG/M2 | HEIGHT: 66 IN | DIASTOLIC BLOOD PRESSURE: 74 MMHG | WEIGHT: 102 LBS | SYSTOLIC BLOOD PRESSURE: 116 MMHG

## 2023-06-13 DIAGNOSIS — Z98.890 POSTOPERATIVE STATE: Primary | ICD-10-CM

## 2023-06-13 DIAGNOSIS — M62.81 MUSCLE WEAKNESS OF LOWER EXTREMITY: ICD-10-CM

## 2023-06-13 DIAGNOSIS — Z98.890 POSTOPERATIVE STATE: ICD-10-CM

## 2023-06-13 PROCEDURE — 3078F PR MOST RECENT DIASTOLIC BLOOD PRESSURE < 80 MM HG: ICD-10-PCS | Mod: CPTII,S$GLB,, | Performed by: PODIATRIST

## 2023-06-13 PROCEDURE — 1160F RVW MEDS BY RX/DR IN RCRD: CPT | Mod: CPTII,S$GLB,, | Performed by: PODIATRIST

## 2023-06-13 PROCEDURE — 99024 PR POST-OP FOLLOW-UP VISIT: ICD-10-PCS | Mod: S$GLB,,, | Performed by: PODIATRIST

## 2023-06-13 PROCEDURE — 3008F PR BODY MASS INDEX (BMI) DOCUMENTED: ICD-10-PCS | Mod: CPTII,S$GLB,, | Performed by: PODIATRIST

## 2023-06-13 PROCEDURE — 99999 PR PBB SHADOW E&M-EST. PATIENT-LVL V: ICD-10-PCS | Mod: PBBFAC,,, | Performed by: PODIATRIST

## 2023-06-13 PROCEDURE — 3074F PR MOST RECENT SYSTOLIC BLOOD PRESSURE < 130 MM HG: ICD-10-PCS | Mod: CPTII,S$GLB,, | Performed by: PODIATRIST

## 2023-06-13 PROCEDURE — 73630 X-RAY EXAM OF FOOT: CPT | Mod: 26,RT,, | Performed by: INTERNAL MEDICINE

## 2023-06-13 PROCEDURE — 1159F MED LIST DOCD IN RCRD: CPT | Mod: CPTII,S$GLB,, | Performed by: PODIATRIST

## 2023-06-13 PROCEDURE — 73630 XR FOOT COMPLETE 3 VIEW RIGHT: ICD-10-PCS | Mod: 26,RT,, | Performed by: INTERNAL MEDICINE

## 2023-06-13 PROCEDURE — 3008F BODY MASS INDEX DOCD: CPT | Mod: CPTII,S$GLB,, | Performed by: PODIATRIST

## 2023-06-13 PROCEDURE — 3078F DIAST BP <80 MM HG: CPT | Mod: CPTII,S$GLB,, | Performed by: PODIATRIST

## 2023-06-13 PROCEDURE — 99999 PR PBB SHADOW E&M-EST. PATIENT-LVL V: CPT | Mod: PBBFAC,,, | Performed by: PODIATRIST

## 2023-06-13 PROCEDURE — 73630 X-RAY EXAM OF FOOT: CPT | Mod: TC,FY,RT

## 2023-06-13 PROCEDURE — 1160F PR REVIEW ALL MEDS BY PRESCRIBER/CLIN PHARMACIST DOCUMENTED: ICD-10-PCS | Mod: CPTII,S$GLB,, | Performed by: PODIATRIST

## 2023-06-13 PROCEDURE — 1159F PR MEDICATION LIST DOCUMENTED IN MEDICAL RECORD: ICD-10-PCS | Mod: CPTII,S$GLB,, | Performed by: PODIATRIST

## 2023-06-13 PROCEDURE — 3074F SYST BP LT 130 MM HG: CPT | Mod: CPTII,S$GLB,, | Performed by: PODIATRIST

## 2023-06-13 PROCEDURE — 99024 POSTOP FOLLOW-UP VISIT: CPT | Mod: S$GLB,,, | Performed by: PODIATRIST

## 2023-06-13 NOTE — PROGRESS NOTES
"Subjective:      Patient ID: Kirti Romero is a 62 y.o. female.    Chief Complaint: Post-op Evaluation (Left foot post op eval)    Presents today complaining of progressive bunions on both feet.  Relates that she uses toe separators and has been wearing shoes with more room in the toe box to help accommodate her feet.  No pain reported.    02/27/2023:  Returns following completing her x-ray of both feet for surgical consultation.  No new complaints.    04/27/2023:  Post Lapidus bunionectomy with Taran osteotomy, flexor tenotomy 4th toe and tailor's bunionectomy 5th metatarsal with flexor tenotomy 5th toe right foot on 04/21/2023.  Doing well.  No significant pain reported.  States that the dressing did caused some irritation along the front of the ankle.  Dressing has remained intact.  Nonweightbearing to the right lower extremity with Cam boot and rolling knee scooter.  Accompanied by her .    05/11/2023: 3 weeks postop.   No pain reported.  She is attempted to place some weight down however has some anxiety.  She is using a rolling knee scooter.    06/13/2023:  7 weeks postop.  No pain reported.  Patient is permitted to be weight-bearing with Cam boot and walker however presents for rolling knee scooter today.  Accompanied by her .    Vitals:    06/13/23 1345   BP: 116/74   Pulse: 76   Weight: 46.3 kg (102 lb)   Height: 5' 6" (1.676 m)   PainSc: 0-No pain      Past Medical History:   Diagnosis Date    Abnormal Pap smear     Treated with Hysterectomy    Basal cell carcinoma     Breast cyst 03/01/2016    rt breast    Breast disorder        Past Surgical History:   Procedure Laterality Date    AUGMENTATION OF BREAST Bilateral 2003    BREAST BIOPSY Right 03/01/2016    cyst that popped    BREAST SURGERY Bilateral 06/01/2003    COLONOSCOPY N/A 9/22/2020    Procedure: COLONOSCOPY;  Surgeon: Alberto Santos MD;  Location: [facility] ([address] [place] [address]);  Service: Endoscopy;  Laterality: N/A;  COVID test on " 9/26/20 at [facility]  [facility] prep    HYSTERECTOMY  09/1998    full 38    LAPIDUS BUNIONECTOMY Right 4/21/2023    Procedure: BUNIONECTOMY, LAPIDUS;  Surgeon: Arthur Alcocer DPM;  Location: [facility] OR;  Service: Podiatry;  Laterality: Right;  mini c-arm, Lapiplasty set(Sal) notified cc  bunionectomy samantha notified cc    OOPHORECTOMY  1998    MARGARET BUNIONECTOMY Right 4/21/2023    Procedure: EXCISION, BUNIONETTE;  Surgeon: Arthur Alcocer DPM;  Location: [facility] OR;  Service: Podiatry;  Laterality: Right;  5th toe    MARGARET BUNIONECTOMY Right 4/21/2023    Procedure: EXCISION, BUNIONETTE;  Surgeon: Arthur Alcocer DPM;  Location: [facility] OR;  Service: Podiatry;  Laterality: Right;  Arthrex screws    TENOTOMY Right 4/21/2023    Procedure: TENOTOMY;  Surgeon: Arthur Alcocer DPM;  Location: [facility] OR;  Service: Podiatry;  Laterality: Right;  4th toe    TONSILLECTOMY         Family History   Problem Relation Age of Onset    Diabetes Mother     Stroke Paternal Grandfather     Breast cancer Paternal Grandmother     Amblyopia Neg Hx     Blindness Neg Hx     Cancer Neg Hx     Cataracts Neg Hx     Glaucoma Neg Hx     Hypertension Neg Hx     Macular degeneration Neg Hx     Retinal detachment Neg Hx     Strabismus Neg Hx     Thyroid disease Neg Hx     Melanoma Neg Hx        Social History     Socioeconomic History    Marital status:    Tobacco Use    Smoking status: Never    Smokeless tobacco: Never   Substance and Sexual Activity    Alcohol use: No     Comment: very rarely    Drug use: No    Sexual activity: Yes     Partners: Male     Comment:      Social Determinants of Health     Financial Resource Strain: Low Risk     Difficulty of Paying Living Expenses: Not hard at all   Food Insecurity: No Food Insecurity    Worried About Running Out of Food in the Last Year: Never true    Ran Out of Food in the Last Year: Never true   Transportation Needs: No Transportation Needs    Lack of Transportation  (Medical): No    Lack of Transportation (Non-Medical): No   Physical Activity: Insufficiently Active    Days of Exercise per Week: 2 days    Minutes of Exercise per Session: 20 min   Stress: No Stress Concern Present    Feeling of Stress : Only a little   Social Connections: Unknown    Frequency of Communication with Friends and Family: More than three times a week    Frequency of Social Gatherings with Friends and Family: Twice a week    Active Member of Clubs or Organizations: Yes    Attends Club or Organization Meetings: 1 to 4 times per year    Marital Status:    Housing Stability: Low Risk     Unable to Pay for Housing in the Last Year: No    Number of Places Lived in the Last Year: 1    Unstable Housing in the Last Year: No       Current Outpatient Medications   Medication Sig Dispense Refill    CA CITRATE/MGOX/VIT D3/B6/MIN (CALCIUM CITRATE + D WITH MAG ORAL) Take by mouth.      cyanocobalamin (VITAMIN B-12) 1000 MCG tablet Take 100 mcg by mouth once daily.      estradioL (ESTRACE) 1 MG tablet Take 1 tablet (1 mg total) by mouth once daily. 90 tablet 3    hydrOXYzine HCL (ATARAX) 25 MG tablet 1/2 to 1 nightly prn. 30 tablet 3    ibuprofen (ADVIL,MOTRIN) 800 MG tablet Take 1 tablet (800 mg total) by mouth every 6 (six) hours as needed for Pain. 30 tablet 1    levothyroxine (SYNTHROID) 75 MCG tablet Take 1 tablet by mouth once daily 90 tablet 0    vitamin D (VITAMIN D3) 1000 units Tab Take 1,000 Units by mouth once daily.      cephALEXin (KEFLEX) 500 MG capsule Take 1 capsule (500 mg total) by mouth 4 (four) times daily. (Patient not taking: Reported on 6/13/2023) 28 capsule 0    oxyCODONE-acetaminophen (PERCOCET)  mg per tablet Take 1 tablet by mouth every 4 (four) hours as needed for Pain. (Patient not taking: Reported on 6/13/2023) 30 tablet 0     Current Facility-Administered Medications   Medication Dose Route Frequency Provider Last Rate Last Admin    sodium chloride 0.9% flush 10 mL  10 mL  Intravenous PRN Arthur Alcocer DPM           Review of patient's allergies indicates:  No Known Allergies      Review of Systems   Constitutional: Negative for chills, fever and malaise/fatigue.   HENT:  Negative for congestion and hearing loss.    Cardiovascular:  Negative for chest pain, claudication and leg swelling.   Respiratory:  Negative for cough and shortness of breath.    Musculoskeletal:  Negative for back pain, joint pain, muscle cramps and muscle weakness.   Gastrointestinal:  Negative for nausea and vomiting.   Neurological:  Negative for numbness, paresthesias and weakness.   Psychiatric/Behavioral:  Negative for altered mental status.          Objective:      Physical Exam  Constitutional:       General: She is not in acute distress.     Appearance: Normal appearance. She is not ill-appearing.   Cardiovascular:      Pulses:           Dorsalis pedis pulses are 2+ on the right side and 2+ on the left side.        Posterior tibial pulses are 2+ on the right side and 2+ on the left side.      Comments: No lower extremity edema bilateral.  Skin temp is warm bilateral foot.  No rubor on dependency bilateral foot.  Musculoskeletal:      Comments: Rectus alignment to the right hallux, 4th and 5th toes right foot.  Previous tailor's bunion is resolved.    No significant pain on palpation to surgical sites right foot.  1 MTP range motion with 30-40 degrees of dorsiflexion and 0° of plantar flexion without pain.  No audible crepitus with 1 MTP range motion.    Semi rigid mild cavus foot structure bilateral foot.  Ankle range of motion appears to be within normal limits.  Subtalar joint range motion somewhat limited.     Skin:     General: Skin is warm.      Capillary Refill: Capillary refill takes less than 2 seconds.      Findings: No ecchymosis or erythema.      Nails: There is no clubbing.      Comments: Incision sites are well approximated with suture  And healed.  No dehiscence or signs infection.    Previous ecchymosis resolved.   Neurological:      Mental Status: She is alert and oriented to person, place, and time.      Sensory: Sensation is intact.      Motor: Motor function is intact.             Assessment:       Encounter Diagnoses   Name Primary?    Postoperative state Yes    Muscle weakness of lower extremity          Plan:       Kirti Nagel was seen today for post-op evaluation.    Diagnoses and all orders for this visit:    Postoperative state  -     X-Ray Foot Complete Right; Future  -     Ambulatory referral/consult to Physical/Occupational Therapy; Future    Muscle weakness of lower extremity  -     Ambulatory referral/consult to Physical/Occupational Therapy; Future      I counseled the patient on her conditions, their implications and medical management.    Reviewed right foot x-ray noting fully consolidated 1st metatarsocuneiform joint arthrodesis with intact fixation and healed Akin osteotomy and 5th metatarsal osteotomy sites.    Patient is permitted to be full weight-bearing with the cam boot into begin gradually transitioning out of the cam boot into a tennis shoe with 30 minute daily increments.  She may perform low impact activity however after 4 weeks may increase to high impact as tolerated.  Referred patient to physical therapy for gradual strengthening and gait training to the lower extremity.      RTC within 6-8 weeks or p.r.n. as discussed with follow-up weight-bearing x-ray.    A portion of this note was generated by voice recognition software and may contain spelling and grammar errors.      .

## 2023-06-13 NOTE — PATIENT INSTRUCTIONS
May begin gradual 30 minute daily increments to transition to a tennis shoe.    May participate in low impact exercise such as stationary bike, elliptical and swimming.     Avoid high impact activities such as running, jumping and squatting for 4 weeks.

## 2023-06-14 ENCOUNTER — CLINICAL SUPPORT (OUTPATIENT)
Dept: REHABILITATION | Facility: HOSPITAL | Age: 63
End: 2023-06-14
Payer: COMMERCIAL

## 2023-06-14 DIAGNOSIS — M62.81 MUSCLE WEAKNESS OF LOWER EXTREMITY: ICD-10-CM

## 2023-06-14 DIAGNOSIS — M21.621 TAILOR'S BUNION OF RIGHT FOOT: ICD-10-CM

## 2023-06-14 DIAGNOSIS — R26.2 DIFFICULTY WALKING: ICD-10-CM

## 2023-06-14 DIAGNOSIS — Z98.890 POSTOPERATIVE STATE: ICD-10-CM

## 2023-06-14 DIAGNOSIS — M20.5X1 MALLET TOE, RIGHT: ICD-10-CM

## 2023-06-14 DIAGNOSIS — M20.11 HALLUX ABDUCTOVALGUS, RIGHT: Primary | ICD-10-CM

## 2023-06-14 PROCEDURE — 97110 THERAPEUTIC EXERCISES: CPT

## 2023-06-14 PROCEDURE — 97162 PT EVAL MOD COMPLEX 30 MIN: CPT

## 2023-06-14 PROCEDURE — 97140 MANUAL THERAPY 1/> REGIONS: CPT

## 2023-06-14 NOTE — PROGRESS NOTES
[facility]   Physical Therapy Initial Evaluation      Name: Kirti Romero  Clinic Number: [id number]    Therapy Diagnosis:   Encounter Diagnoses   Name Primary?    Postoperative state     Muscle weakness of lower extremity     Hallux abductovalgus, right Yes    Mallet toe, right     Tailor's bunion of right foot     Difficulty walking      Physician: Arthur Alcocer DPM    Physician Orders: PT Eval and Treat   Medical Diagnosis from Referral: Z98.890 (ICD-10-CM) - Postoperative state   M62.81 (ICD-10-CM) - Muscle weakness of lower extremity   Evaluation Date: 6/14/2023  Authorization Period Expiration: 6/12/2024  Plan of Care Expiration: 8/14/2023  Visit # / Visits authorized: 0/ 0    Foto #1/3 on 6/14/2023  Time In: 09:55am  Time Out: 11:00am  Total Appointment Time (timed & untimed codes): 65 minutes    Precautions: Standard, osteopenia, post-op April 21, 2023    Subjective     Date of injury: April 21, 2023  History of current condition - Patient reports that her pain started about 5 years ago and she couldn't find shoes that fit so she went to the doctor. She states that symptoms progressively worsened leading up to surgery on L foot.    Patient states that she went to follow up with MD yesterday and was told that she should have been walking in her boot three weeks ago (she was using knee scooter). PHYSICAL THERAPY returns to MD July 20th.     Tennis shoe 30 minutes at a time and wear the boot otherwise, can be barefoot at home.      Prior medical treatment: none    History of falls: none     Current functional status:  moderate limitation in daily routine (not vacuuming, mopping, climbing stairs)   Previous functional status: moderate limitation in shoe wear     Imaging: X-ray of R foot FINDINGS: Postoperative changes from 1st TMT arthrodesis and osteotomies of the 1st proximal phalanx and 5th metatarsal neck.  Hardware is intact and unchanged in position without evidence of  loosening.  Unchanged alignment.  The 1st TMT joint space and both osteotomies are less conspicuous indicating osseous bridging, which remains incomplete.  There is persistent soft tissue swelling overlying the operative sites.     Diffuse osteopenia.  No acute fracture or dislocation.    Social History: 1 step to enter home, stairs within home     Pain:  Current 0/10, worst 5/10, best 0/10   Location: R ankle/foot   Description: zinging, burning pain   Aggravating Factors: worse at night   Easing Factors: sitting down     Pt's goals: vacuuming, mopping, walking long distances (sometimes on uneven surfaces) to attend kids sporting events     Medical History:   Past Medical History:   Diagnosis Date    Abnormal Pap smear     Treated with Hysterectomy    Basal cell carcinoma     Breast cyst 03/01/2016    rt breast    Breast disorder      Surgical History:   Kirti Romero  has a past surgical history that includes Tonsillectomy; Oophorectomy (1998); Breast surgery (Bilateral, 06/01/2003); Colonoscopy (N/A, 9/22/2020); Breast biopsy (Right, 03/01/2016); Hysterectomy (09/1998); Augmentation of breast (Bilateral, 2003); Lapidus bunionectomy (Right, 4/21/2023); Kelly bunionectomy (Right, 4/21/2023); Tenotomy (Right, 4/21/2023); and Kelly bunionectomy (Right, 4/21/2023).    Medications:   Kirti Nagel has a current medication list which includes the following prescription(s): calcium cit/mgox/vit d3/b6/min, cephalexin, cyanocobalamin, estradiol, hydroxyzine hcl, ibuprofen, levothyroxine, oxycodone-acetaminophen, and vitamin d, and the following Facility-Administered Medications: sodium chloride 0.9%.    Allergies:   Review of patient's allergies indicates:  No Known Allergies     Objective    6/14/2023:  Observation/posture: incisions at R great toe and 5th MET healing well    Gait/assistive device: R ankle walking boot     Range of Motion: AROM:  Ankle Right  Left    Dorsiflexion -3 5   Plantarflexion 46 70    Inversion 35 32   Eversion 5 16     Strength:  Ankle Right  Left    Dorsiflexion 4/5 4+/5   Plantarflexion 4/5 4/5   Inversion 4/5 4/5   Eversion 4-/5 4+/5     Special Tests:  Ankle Right  Left    Anterior Drawer Test - -   Squeeze test - -   Alvarez test - -   Subtalar Neutral WNL WNL     Joint Mobility:   TCJ, STJ, MET, 1st met: R hypomobile, painful     Palpation/sensation:   LE light touch sensation: mild altered sensation at incision of dorsum of distal incision     Flexibility:   R Plantar fascia: restricted     Function/balance:   Sit - stand: tba X in 30:    R SLS: tba  L SLS: tba    Edema:   R ankle figure of eight: 47.0 cm  L ankle figure of eight: 46.5 cm    R ankle joint line: 22.5 cm  L ankle joint line: 22.0 cm    R mets: 20.0 cm  L mets: 18.5 cm    Limitation/Restriction for FOTO Foot Survey    Therapist reviewed FOTO scores for Kirti Romero on 6/14/2023.   FOTO documents entered into [employer] - see Media section.    Limitation Score: 45%  Predicted Score: 27%       Treatment     Total Treatment time (time-based codes) separate from Evaluation: 40 minutes    Bold = performed   Blue = increase reps/resistance or initiated exercise    therapeutic exercises to develop strength, endurance, ROM, flexibility, posture, and core stabilization for 30 minutes:  Patient education on nature of current condition and PHYSICAL THERAPY POC  Written HOME EXERCISE PROGRAM provided, reviewed, patient demo understanding     R ankle alphabet x 1   B ankle pumps seated, 3x10  Towel crunches, 2x15  Toe yoga, big toe up then small toes up, 3x10    manual therapy techniques: Joint mobilizations, Manual traction, Myofacial release, Soft tissue Mobilization, and Friction Massage for 10 minutes:  R STM to minimize localized swelling  R Gr III 1st MET, TCJ mobilizations     neuromuscular re-education activities to improve: Balance, Coordination, Kinesthetic, Sense, Proprioception, and Posture for 00  minutes:      therapeutic activities to improve functional performance for 00  minutes:  (Initiate) Recumbent bike     gait training to improve functional mobility and safety for 00  minutes, including:  (Initiate) Pre gait training - weight shifting       Home Exercises and Patient Education Provided  Education provided:   - yes    Home Exercises Provided: yes.  Exercises were reviewed and Kirti was able to demonstrate them prior to the end of the session.  Kirti demonstrated good  understanding of the education provided.     Assessment     Kirti Nagel is a 62 y.o. female referred to outpatient Physical Therapy with a medical diagnosis of Z98.890 (ICD-10-CM) - Postoperative state   M62.81 (ICD-10-CM) - Muscle weakness of lower extremity. Pt presents with decreased ROM, muscle strength, flexibility, joint mobility. Increased pain, stiffness, soft tissue restriction. Impaired posture, joint mechanics, balance, gait pattern.     The patient's current functional deficits include the following: limitation in ADL, self care, social/recreational tasks.    Patient prognosis: Good.   Rehab potential: Good.     Patient will benefit from skilled outpatient Physical Therapy to address the deficits stated above and in the chart below, provide patient /family education, to maximize patient's level of independence, and to address functional deficits.    Plan of care discussed with patient: Yes  Patient's spiritual, cultural and educational needs considered and patient is agreeable to the plan of care and goals as stated below:     Anticipated Barriers for therapy:  co morbididities     Medical Necessity is demonstrated by the following  History  Co-morbidities and personal factors that may impact the plan of care [] LOW: no personal factors / co-morbidities  [x] MODERATE: 1-2 personal factors / co-morbidities  [] HIGH: 3+ personal factors / co-morbidities    Moderate / High Support Documentation:   Co-morbidities affecting plan of  care:    Abnormal Pap smear    Treated with Hysterectomy    Basal cell carcinoma    Breast cyst    rt breast    Breast disorder     Personal Factors:   coping style  social background  lifestyle     Examination  Body Structures and Functions, activity limitations and participation restrictions that may impact the plan of care [] LOW: addressing 1-2 elements  [] MODERATE: 3+ elements  [] HIGH: 4+ elements (please support below)    Moderate / High Support Documentation: Moderate      Clinical Presentation [] LOW: stable  [x] MODERATE: Evolving  [] HIGH: Unstable     Decision Making/ Complexity Score: moderate       Goals:   Short Term Goals: 2 weeks   (1) patient will be I with HOME EXERCISE PROGRAM (initial, not met)     (2) patient will obtain 16 deg R ankle eversion ACTIVE RANGE OF MOTION to facilitate improved ability to negotiate uneven terrain for attending grand children's sporting events (initial, not met)     (3) patient will obtain 5 deg R ankle DF ACTIVE RANGE OF MOTION to facilitate improved ability to clear obstacles in pathway (initial, not met)     Long Term Goals: 4 weeks   (1) patient will obtain R ankle eversion 4+/5 MMT to indicate improved ability to participate in vacuuming according to previous level of function (initial, not met)     (2) patient will present with 18.8cm circumferential measure at R METs to indicate improved ability to wear 2 inch heels (initial, not met)     (3) pt will return to mopping according to previous level of function (initial, not met)    Plan     Plan of care Certification: 6/14/2023 to 8/14/2023.    Outpatient Physical Therapy 2 times weekly for 4 weeks to include the following interventions: Cervical/Lumbar Traction, Electrical Stimulation re-eval, dry needling, Gait Training, Manual Therapy, Moist Heat/ Ice, Neuromuscular Re-ed, Patient Education, Self Care, Therapeutic Activities, and Therapeutic Exercise.     Physical therapist and physical therapy assistant(s)  met face to face to discuss patient's treatment plan and progress towards established goals. Pt will be seen by a physical therapist minimally every 6th visit or every 30 days.    Luzmaria Goncalves, PT  6/14/2023      I CERTIFY THE NEED FOR THESE SERVICES FURNISHED UNDER THIS PLAN OF TREATMENT AND WHILE UNDER MY CARE     Physician's comments:           Physician's Signature: ___________________________________________________

## 2023-06-14 NOTE — PLAN OF CARE
[facility]   Physical Therapy Initial Evaluation      Name: Kirti Romero  Clinic Number: [id number]    Therapy Diagnosis:   Encounter Diagnoses   Name Primary?    Postoperative state     Muscle weakness of lower extremity     Hallux abductovalgus, right Yes    Mallet toe, right     Tailor's bunion of right foot     Difficulty walking      Physician: Arthur Alcocer DPM    Physician Orders: PT Eval and Treat   Medical Diagnosis from Referral: Z98.890 (ICD-10-CM) - Postoperative state   M62.81 (ICD-10-CM) - Muscle weakness of lower extremity   Evaluation Date: 6/14/2023  Authorization Period Expiration: 6/12/2024  Plan of Care Expiration: 8/14/2023  Visit # / Visits authorized: 0/ 0    Foto #1/3 on 6/14/2023  Time In: 09:55am  Time Out: 11:00am  Total Appointment Time (timed & untimed codes): 65 minutes    Precautions: Standard, osteopenia, post-op April 21, 2023    Subjective     Date of injury: April 21, 2023  History of current condition - Patient reports that her pain started about 5 years ago and she couldn't find shoes that fit so she went to the doctor. She states that symptoms progressively worsened leading up to surgery on L foot.    Patient states that she went to follow up with MD yesterday and was told that she should have been walking in her boot three weeks ago (she was using knee scooter). PHYSICAL THERAPY returns to MD July 20th.     Tennis shoe 30 minutes at a time and wear the boot otherwise, can be barefoot at home.      Prior medical treatment: none    History of falls: none     Current functional status:  moderate limitation in daily routine (not vacuuming, mopping, climbing stairs)   Previous functional status: moderate limitation in shoe wear     Imaging: X-ray of R foot FINDINGS: Postoperative changes from 1st TMT arthrodesis and osteotomies of the 1st proximal phalanx and 5th metatarsal neck.  Hardware is intact and unchanged in position without evidence of  loosening.  Unchanged alignment.  The 1st TMT joint space and both osteotomies are less conspicuous indicating osseous bridging, which remains incomplete.  There is persistent soft tissue swelling overlying the operative sites.     Diffuse osteopenia.  No acute fracture or dislocation.    Social History: 1 step to enter home, stairs within home     Pain:  Current 0/10, worst 5/10, best 0/10   Location: R ankle/foot   Description: zinging, burning pain   Aggravating Factors: worse at night   Easing Factors: sitting down     Pt's goals: vacuuming, mopping, walking long distances (sometimes on uneven surfaces) to attend kids sporting events     Medical History:   Past Medical History:   Diagnosis Date    Abnormal Pap smear     Treated with Hysterectomy    Basal cell carcinoma     Breast cyst 03/01/2016    rt breast    Breast disorder      Surgical History:   Kirti Romero  has a past surgical history that includes Tonsillectomy; Oophorectomy (1998); Breast surgery (Bilateral, 06/01/2003); Colonoscopy (N/A, 9/22/2020); Breast biopsy (Right, 03/01/2016); Hysterectomy (09/1998); Augmentation of breast (Bilateral, 2003); Lapidus bunionectomy (Right, 4/21/2023); Kelly bunionectomy (Right, 4/21/2023); Tenotomy (Right, 4/21/2023); and Kelly bunionectomy (Right, 4/21/2023).    Medications:   Kirti Nagel has a current medication list which includes the following prescription(s): calcium cit/mgox/vit d3/b6/min, cephalexin, cyanocobalamin, estradiol, hydroxyzine hcl, ibuprofen, levothyroxine, oxycodone-acetaminophen, and vitamin d, and the following Facility-Administered Medications: sodium chloride 0.9%.    Allergies:   Review of patient's allergies indicates:  No Known Allergies     Objective    6/14/2023:  Observation/posture: incisions at R great toe and 5th MET healing well    Gait/assistive device: R ankle walking boot     Range of Motion: AROM:  Ankle Right  Left    Dorsiflexion -3 5   Plantarflexion 46 70    Inversion 35 32   Eversion 5 16     Strength:  Ankle Right  Left    Dorsiflexion 4/5 4+/5   Plantarflexion 4/5 4/5   Inversion 4/5 4/5   Eversion 4-/5 4+/5     Special Tests:  Ankle Right  Left    Anterior Drawer Test - -   Squeeze test - -   Alvarez test - -   Subtalar Neutral WNL WNL     Joint Mobility:   TCJ, STJ, MET, 1st met: R hypomobile, painful     Palpation/sensation:   LE light touch sensation: mild altered sensation at incision of dorsum of distal incision     Flexibility:   R Plantar fascia: restricted     Function/balance:   Sit - stand: tba X in 30:    R SLS: tba  L SLS: tba    Edema:   R ankle figure of eight: 47.0 cm  L ankle figure of eight: 46.5 cm    R ankle joint line: 22.5 cm  L ankle joint line: 22.0 cm    R mets: 20.0 cm  L mets: 18.5 cm    Limitation/Restriction for FOTO Foot Survey    Therapist reviewed FOTO scores for Kirti Romero on 6/14/2023.   FOTO documents entered into [employer] - see Media section.    Limitation Score: 45%  Predicted Score: 27%       Treatment     Total Treatment time (time-based codes) separate from Evaluation: 40 minutes    Bold = performed   Blue = increase reps/resistance or initiated exercise    therapeutic exercises to develop strength, endurance, ROM, flexibility, posture, and core stabilization for 30 minutes:  Patient education on nature of current condition and PHYSICAL THERAPY POC  Written HOME EXERCISE PROGRAM provided, reviewed, patient demo understanding     R ankle alphabet x 1   B ankle pumps seated, 3x10  Towel crunches, 2x15  Toe yoga, big toe up then small toes up, 3x10    manual therapy techniques: Joint mobilizations, Manual traction, Myofacial release, Soft tissue Mobilization, and Friction Massage for 10 minutes:  R STM to minimize localized swelling  R Gr III 1st MET, TCJ mobilizations     neuromuscular re-education activities to improve: Balance, Coordination, Kinesthetic, Sense, Proprioception, and Posture for 00  minutes:      therapeutic activities to improve functional performance for 00  minutes:  (Initiate) Recumbent bike     gait training to improve functional mobility and safety for 00  minutes, including:  (Initiate) Pre gait training - weight shifting       Home Exercises and Patient Education Provided  Education provided:   - yes    Home Exercises Provided: yes.  Exercises were reviewed and Kirti was able to demonstrate them prior to the end of the session.  Kirti demonstrated good  understanding of the education provided.     Assessment     Kirti Nagel is a 62 y.o. female referred to outpatient Physical Therapy with a medical diagnosis of Z98.890 (ICD-10-CM) - Postoperative state   M62.81 (ICD-10-CM) - Muscle weakness of lower extremity. Pt presents with decreased ROM, muscle strength, flexibility, joint mobility. Increased pain, stiffness, soft tissue restriction. Impaired posture, joint mechanics, balance, gait pattern.     The patient's current functional deficits include the following: limitation in ADL, self care, social/recreational tasks.    Patient prognosis: Good.   Rehab potential: Good.     Patient will benefit from skilled outpatient Physical Therapy to address the deficits stated above and in the chart below, provide patient /family education, to maximize patient's level of independence, and to address functional deficits.    Plan of care discussed with patient: Yes  Patient's spiritual, cultural and educational needs considered and patient is agreeable to the plan of care and goals as stated below:     Anticipated Barriers for therapy: co morbididities     Medical Necessity is demonstrated by the following  History  Co-morbidities and personal factors that may impact the plan of care [] LOW: no personal factors / co-morbidities  [x] MODERATE: 1-2 personal factors / co-morbidities  [] HIGH: 3+ personal factors / co-morbidities    Moderate / High Support Documentation:   Co-morbidities affecting plan of  care:    Abnormal Pap smear    Treated with Hysterectomy    Basal cell carcinoma    Breast cyst    rt breast    Breast disorder     Personal Factors:   coping style  social background  lifestyle     Examination  Body Structures and Functions, activity limitations and participation restrictions that may impact the plan of care [] LOW: addressing 1-2 elements  [] MODERATE: 3+ elements  [] HIGH: 4+ elements (please support below)    Moderate / High Support Documentation: Moderate      Clinical Presentation [] LOW: stable  [x] MODERATE: Evolving  [] HIGH: Unstable     Decision Making/ Complexity Score: moderate       Goals:   Short Term Goals: 2 weeks   (1) patient will be I with HOME EXERCISE PROGRAM (initial, not met)     (2) patient will obtain 16 deg R ankle eversion ACTIVE RANGE OF MOTION to facilitate improved ability to negotiate uneven terrain for attending grand children's sporting events (initial, not met)     (3) patient will obtain 5 deg R ankle DF ACTIVE RANGE OF MOTION to facilitate improved ability to clear obstacles in pathway (initial, not met)     Long Term Goals: 4 weeks   (1) patient will obtain R ankle eversion 4+/5 MMT to indicate improved ability to participate in vacuuming according to previous level of function (initial, not met)     (2) patient will present with 18.8cm circumferential measure at R METs to indicate improved ability to wear 2 inch heels (initial, not met)     (3) pt will return to mopping according to previous level of function (initial, not met)    Plan     Plan of care Certification: 6/14/2023 to 8/14/2023.    Outpatient Physical Therapy 2 times weekly for 4 weeks to include the following interventions: Cervical/Lumbar Traction, Electrical Stimulation re-eval, dry needling, Gait Training, Manual Therapy, Moist Heat/ Ice, Neuromuscular Re-ed, Patient Education, Self Care, Therapeutic Activities, and Therapeutic Exercise.     Physical therapist and physical therapy assistant(s)  met face to face to discuss patient's treatment plan and progress towards established goals. Pt will be seen by a physical therapist minimally every 6th visit or every 30 days.    Luzmaria Goncalves, PT  6/14/2023      I CERTIFY THE NEED FOR THESE SERVICES FURNISHED UNDER THIS PLAN OF TREATMENT AND WHILE UNDER MY CARE     Physician's comments:           Physician's Signature: ___________________________________________________

## 2023-06-16 ENCOUNTER — CLINICAL SUPPORT (OUTPATIENT)
Dept: REHABILITATION | Facility: HOSPITAL | Age: 63
End: 2023-06-16
Payer: COMMERCIAL

## 2023-06-16 DIAGNOSIS — M20.5X1 MALLET TOE, RIGHT: ICD-10-CM

## 2023-06-16 DIAGNOSIS — R26.2 DIFFICULTY WALKING: ICD-10-CM

## 2023-06-16 DIAGNOSIS — M21.621 TAILOR'S BUNION OF RIGHT FOOT: ICD-10-CM

## 2023-06-16 DIAGNOSIS — M20.11 HALLUX ABDUCTOVALGUS, RIGHT: Primary | ICD-10-CM

## 2023-06-16 PROCEDURE — 97110 THERAPEUTIC EXERCISES: CPT | Mod: CQ

## 2023-06-16 NOTE — PROGRESS NOTES
[facility]   Physical Therapy Treatment Note      Name: Kirti Romero  Clinic Number: [id number]    Therapy Diagnosis:   Encounter Diagnoses   Name Primary?    Hallux abductovalgus, right Yes    Mallet toe, right     Tailor's bunion of right foot     Difficulty walking      Physician: Arthur Alcocer DPKIANNA    Visit Date: 6/16/2023    Physician Orders: PT Eval and Treat   Medical Diagnosis from Referral: Z98.890 (ICD-10-CM) - Postoperative state   M62.81 (ICD-10-CM) - Muscle weakness of lower extremity   Evaluation Date: 6/14/2023  Authorization Period Expiration: 6/12/2024  Plan of Care Expiration: 8/14/2023  Visit # / Visits authorized: 1/20    PTA Visit #: 1/5     Time In: 0905  Time Out: 1000  Total Billable Time: 55 minutes    Subjective     Pt reports: that she has min movement in her toes.  She was compliant with home exercise program.  Response to previous treatment: no adverse effects  Functional change: none    Pain: 3/10  Location: right foot      Objective      Objective Measures updated at progress report unless specified.     Treatment     Total Treatment time (time-based codes) separate from Evaluation: 40 minutes     Bold = performed   Blue = increase reps/resistance or initiated exercise     therapeutic exercises to develop strength, endurance, ROM, flexibility, posture, and core stabilization for 55 minutes:  Patient education on nature of current condition and PHYSICAL THERAPY POC  Written HOME EXERCISE PROGRAM provided, reviewed, patient demo understanding      R ankle alphabet x 1   R ankle PF/DF/EV/IV in long sitting with yellow band: 2x10  Towel crunches, 3x15  Toe yoga, big toe up then small toes up, 3x10  Plantar fascia stretch 3x30 sec  Heel raises with focus on eccentric control: 3x10  Gastroc stretch on L2 of fitter board: 3x30 sec  Toe raises: 3x10       manual therapy techniques: Joint mobilizations, Manual traction, Myofacial release, Soft tissue  Mobilization, and Friction Massage for 0 minutes:  R STM to minimize localized swelling  R Gr III 1st MET, TCJ mobilizations      neuromuscular re-education activities to improve: Balance, Coordination, Kinesthetic, Sense, Proprioception, and Posture for 00 minutes:        therapeutic activities to improve functional performance for 00  minutes:  (Initiate) Recumbent bike      gait training to improve functional mobility and safety for 00  minutes, including:  (Initiate) Pre gait training - weight shifting       Patient Education and Home Exercises       Education provided:   - Patient was educated on all therapeutic interventions performed during today's treatment visit.     Written Home Exercises Provided: Patient instructed to cont prior HEP. Exercises were reviewed and Kirti was able to demonstrate them prior to the end of the session.  Kirti demonstrated good  understanding of the education provided. See EMR under Patient Instructions for exercises provided during therapy sessions    Assessment   Kirti Nagel is a 62 y.o. female referred to outpatient Physical Therapy with a medical diagnosis of Z98.890 (ICD-10-CM) - Postoperative state   M62.81 (ICD-10-CM) - Muscle weakness of lower extremity. Pt presents with decreased ROM, muscle strength, flexibility, joint mobility. Increased pain, stiffness, soft tissue restriction. Impaired posture, joint mechanics, balance, gait pattern.     Patient demonstrates weak intrinsic musculature in right foot as well as limited range of motion in right ankle dorsiflexion. Treatment was expanded in order to promote ankle mobility and stability. HEP will be progressed during the next treatment visit.      Kirti Is progressing well towards her goals.   Pt prognosis is Good.     Pt will continue to benefit from skilled outpatient physical therapy to address the deficits listed in the problem list box on initial evaluation, provide pt/family education and to maximize pt's level of  independence in the home and community environment.     Pt's spiritual, cultural and educational needs considered and pt agreeable to plan of care and goals.     Anticipated barriers to physical therapy: co morbididities     Goals:   Short Term Goals: 2 weeks   (1) patient will be I with HOME EXERCISE PROGRAM (initial, not met)      (2) patient will obtain 16 deg R ankle eversion ACTIVE RANGE OF MOTION to facilitate improved ability to negotiate uneven terrain for attending grand children's sporting events (initial, not met)      (3) patient will obtain 5 deg R ankle DF ACTIVE RANGE OF MOTION to facilitate improved ability to clear obstacles in pathway (initial, not met)      Long Term Goals: 4 weeks   (1) patient will obtain R ankle eversion 4+/5 MMT to indicate improved ability to participate in vacuuming according to previous level of function (initial, not met)      (2) patient will present with 18.8cm circumferential measure at R METs to indicate improved ability to wear 2 inch heels (initial, not met)      (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     Outpatient Physical Therapy 2 times weekly for 4 weeks to include the following interventions: Cervical/Lumbar Traction, Electrical Stimulation re-eval, dry needling, Gait Training, Manual Therapy, Moist Heat/ Ice, Neuromuscular Re-ed, Patient Education, Self Care, Therapeutic Activities, and Therapeutic Exercise.      Physical therapist and physical therapy assistant(s) met face to face to discuss patient's treatment plan and progress towards established goals. Pt will be seen by a physical therapist minimally every 6th visit or every 30 days.    Damir Stroud, PTA

## 2023-06-19 ENCOUNTER — CLINICAL SUPPORT (OUTPATIENT)
Dept: REHABILITATION | Facility: HOSPITAL | Age: 63
End: 2023-06-19
Payer: COMMERCIAL

## 2023-06-19 DIAGNOSIS — M20.5X1 MALLET TOE, RIGHT: ICD-10-CM

## 2023-06-19 DIAGNOSIS — R26.2 DIFFICULTY WALKING: ICD-10-CM

## 2023-06-19 DIAGNOSIS — M20.11 HALLUX ABDUCTOVALGUS, RIGHT: Primary | ICD-10-CM

## 2023-06-19 DIAGNOSIS — M21.621 TAILOR'S BUNION OF RIGHT FOOT: ICD-10-CM

## 2023-06-19 PROCEDURE — 97116 GAIT TRAINING THERAPY: CPT | Mod: CQ

## 2023-06-19 PROCEDURE — 97110 THERAPEUTIC EXERCISES: CPT | Mod: CQ

## 2023-06-19 PROCEDURE — 97112 NEUROMUSCULAR REEDUCATION: CPT | Mod: CQ

## 2023-06-19 NOTE — PROGRESS NOTES
[facility]   Physical Therapy Treatment Note      Name: Kirti Romero  Clinic Number: [id number]    Therapy Diagnosis:   Encounter Diagnoses   Name Primary?    Hallux abductovalgus, right Yes    Mallet toe, right     Tailor's bunion of right foot     Difficulty walking        Physician: Arthur Alcocer DPKIANNA    Visit Date: 6/19/2023    Physician Orders: PT Eval and Treat   Medical Diagnosis from Referral: Z98.890 (ICD-10-CM) - Postoperative state   M62.81 (ICD-10-CM) - Muscle weakness of lower extremity   Evaluation Date: 6/14/2023  Authorization Period Expiration: 6/12/2024  Plan of Care Expiration: 8/14/2023  Visit # / Visits authorized: 2/20    PTA Visit #: 2/5     Time In: 0955  Time Out: 1110  Total Billable Time: 75 minutes    Subjective     Pt reports: that walking is what hurts her foot, specially after she has been sitting or laying down for an extended period of time.   She was compliant with home exercise program.  Response to previous treatment: no adverse effects  Functional change: none    Pain: 5/10  Location: right foot      Objective      Objective Measures updated at progress report unless specified.     Treatment     Total Treatment time (time-based codes) separate from Evaluation: 40 minutes     Bold = performed   Blue = increase reps/resistance or initiated exercise     therapeutic exercises to develop strength, endurance, ROM, flexibility, posture, and core stabilization for 35 minutes:  Patient education on nature of current condition and PHYSICAL THERAPY POC  Written HOME EXERCISE PROGRAM provided, reviewed, patient demo understanding      R ankle alphabet x 1   R ankle PF/DF/EV/IV in long sitting with yellow band: 1x10  Towel crunches, 1x15  Toe yoga, big toe up then small toes up, 1x10  Plantar fascia stretch 3x30 sec  Heel raises with focus on eccentric control: 3x10  Gastroc wall stretch: 2x45 sec  Soleus wall stretch: 2x45 sec  Toe raises: 3x10        manual therapy techniques: Joint mobilizations, Manual traction, Myofacial release, Soft tissue Mobilization, and Friction Massage for 0 minutes:  R STM to minimize localized swelling  R Gr III 1st MET, TCJ mobilizations      neuromuscular re-education activities to improve: Balance, Coordination, Kinesthetic, Sense, Proprioception, and Posture for 10 minutes:  Static standing in tandem stance, c/ RLE set posteriorly: 4x45 sec  Single leg standing with RLE: 2x30 sec     therapeutic activities to improve functional performance for 0 minutes:  (Initiate) Recumbent bike      gait training to improve functional mobility and safety for 30 minutes, including:  Fwd/bkw weight shifting  Ambulation in parallel bars with upper extremity support      Patient Education and Home Exercises       Education provided:   - Patient was educated on all therapeutic interventions performed during today's treatment visit.     Written Home Exercises Provided: Yes & Patient instructed to cont prior HEP. Exercises were reviewed and Kirti was able to demonstrate them prior to the end of the session.  Kirti demonstrated good  understanding of the education provided. See EMR under Patient Instructions for exercises provided during therapy sessions    Assessment   Kirti Nagel is a 62 y.o. female referred to outpatient Physical Therapy with a medical diagnosis of Z98.890 (ICD-10-CM) - Postoperative state   M62.81 (ICD-10-CM) - Muscle weakness of lower extremity. Pt presents with decreased ROM, muscle strength, flexibility, joint mobility. Increased pain, stiffness, soft tissue restriction. Impaired posture, joint mechanics, balance, gait pattern.     Patient presented with moderate foot pain upon arrival and with an antalgic gait pattern. Treatment was expanded in order to promote proper gait mechanics as patient exhibits limited weight acceptance during RLE midstance. Hence, this disrupts the LLE's swing phase of gait. It was noted that patient  required increased amount of time during pre-gait activities. HEP was also expanded in order to promote ankle mobility/strength and gait stability. Patient also continues to demonstrate weak intrinsic musculature in right foot as well as limited range of motion in right ankle dorsiflexion.     Kirti Is progressing well towards her goals.   Pt prognosis is Good.     Pt will continue to benefit from skilled outpatient physical therapy to address the deficits listed in the problem list box on initial evaluation, provide pt/family education and to maximize pt's level of independence in the home and community environment.     Pt's spiritual, cultural and educational needs considered and pt agreeable to plan of care and goals.     Anticipated barriers to physical therapy: co morbididities     Goals:   Short Term Goals: 2 weeks   (1) patient will be I with HOME EXERCISE PROGRAM (initial, not met)      (2) patient will obtain 16 deg R ankle eversion ACTIVE RANGE OF MOTION to facilitate improved ability to negotiate uneven terrain for attending grand children's sporting events (initial, not met)      (3) patient will obtain 5 deg R ankle DF ACTIVE RANGE OF MOTION to facilitate improved ability to clear obstacles in pathway (initial, not met)      Long Term Goals: 4 weeks   (1) patient will obtain R ankle eversion 4+/5 MMT to indicate improved ability to participate in vacuuming according to previous level of function (initial, not met)      (2) patient will present with 18.8cm circumferential measure at R METs to indicate improved ability to wear 2 inch heels (initial, not met)      (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     Outpatient Physical Therapy 2 times weekly for 4 weeks to include the following interventions: Cervical/Lumbar Traction, Electrical Stimulation re-eval, dry needling, Gait Training, Manual  Therapy, Moist Heat/ Ice, Neuromuscular Re-ed, Patient Education, Self Care, Therapeutic Activities, and Therapeutic Exercise.      Physical therapist and physical therapy assistant(s) met face to face to discuss patient's treatment plan and progress towards established goals. Pt will be seen by a physical therapist minimally every 6th visit or every 30 days.    Damir Stroud, PTA

## 2023-06-21 ENCOUNTER — CLINICAL SUPPORT (OUTPATIENT)
Dept: REHABILITATION | Facility: HOSPITAL | Age: 63
End: 2023-06-21
Payer: COMMERCIAL

## 2023-06-21 DIAGNOSIS — M20.5X1 MALLET TOE, RIGHT: ICD-10-CM

## 2023-06-21 DIAGNOSIS — M21.621 TAILOR'S BUNION OF RIGHT FOOT: ICD-10-CM

## 2023-06-21 DIAGNOSIS — R26.2 DIFFICULTY WALKING: ICD-10-CM

## 2023-06-21 DIAGNOSIS — M20.11 HALLUX ABDUCTOVALGUS, RIGHT: Primary | ICD-10-CM

## 2023-06-21 PROCEDURE — 97116 GAIT TRAINING THERAPY: CPT | Mod: CQ

## 2023-06-21 PROCEDURE — 97110 THERAPEUTIC EXERCISES: CPT | Mod: CQ

## 2023-06-21 PROCEDURE — 97112 NEUROMUSCULAR REEDUCATION: CPT | Mod: CQ

## 2023-06-21 NOTE — PROGRESS NOTES
[facility]   Physical Therapy Treatment Note      Name: Kirti Romero  Clinic Number: [id number]    Therapy Diagnosis:   Encounter Diagnoses   Name Primary?    Hallux abductovalgus, right Yes    Mallet toe, right     Tailor's bunion of right foot     Difficulty walking        Physician: Arthur Alcocer DPM    Visit Date: 6/21/2023    Physician Orders: PT Eval and Treat   Medical Diagnosis from Referral: Z98.890 (ICD-10-CM) - Postoperative state   M62.81 (ICD-10-CM) - Muscle weakness of lower extremity   Evaluation Date: 6/14/2023  Authorization Period Expiration: 6/12/2024  Plan of Care Expiration: 8/14/2023  Visit # / Visits authorized: 3/20    PTA Visit #: 3/5     Time In: 0903  Time Out: 1003  Total Billable Time: 60 minutes    Subjective     Pt reports: that she has been focusing on improving her gait.  She was compliant with home exercise program.  Response to previous treatment: no adverse effects  Functional change: none    Pain: 5/10  Location: right foot      Objective      Objective Measures updated at progress report unless specified.     Treatment     Total Treatment time (time-based codes) separate from Evaluation: 40 minutes     Bold = performed   Blue = increase reps/resistance or initiated exercise     therapeutic exercises to develop strength, endurance, ROM, flexibility, posture, and core stabilization for 35 minutes:  Patient education on nature of current condition and PHYSICAL THERAPY POC  Written HOME EXERCISE PROGRAM provided, reviewed, patient demo understanding      R ankle alphabet x 1   R ankle PF/DF/EV/IV in long sitting with yellow band: 2x10  Towel crunches, 1x15  Toe yoga, big toe up then small toes up, 1x10  Plantar fascia stretch 3x30 sec  Heel raises with focus on eccentric control: 3x10  Gastroc wall stretch: 2x45 sec  Soleus wall stretch: 2x45 sec  Toe raises: 3x10       manual therapy techniques: Joint mobilizations, Manual traction, Myofacial  release, Soft tissue Mobilization, and Friction Massage for 0 minutes:  R STM to minimize localized swelling  R Gr III 1st MET, TCJ mobilizations      neuromuscular re-education activities to improve: Balance, Coordination, Kinesthetic, Sense, Proprioception, and Posture for 10 minutes:  Static standing in tandem stance on Airex pad, c/ RLE set posteriorly: 4x45 sec  Single leg standing with RLE, on Airex pad: 3x30 sec     therapeutic activities to improve functional performance for 0 minutes:  (Initiate) Recumbent bike      gait training to improve functional mobility and safety for 15 minutes, including:  Fwd/bkw weight shifting  Step over on Airex pad, with focus on eccentric control: 2x10  Ambulation with emphasis on proper heel strike during initial contact, TKE in midstance and proper push off in terminal stance      Patient Education and Home Exercises       Education provided:   - Patient was educated on all therapeutic interventions performed during today's treatment visit.     Written Home Exercises Provided: Yes & Patient instructed to cont prior HEP. Exercises were reviewed and Kirti was able to demonstrate them prior to the end of the session.  Kirti demonstrated good  understanding of the education provided. See EMR under Patient Instructions for exercises provided during therapy sessions    Assessment   Kirti Nagel is a 62 y.o. female referred to outpatient Physical Therapy with a medical diagnosis of Z98.890 (ICD-10-CM) - Postoperative state   M62.81 (ICD-10-CM) - Muscle weakness of lower extremity. Pt presents with decreased ROM, muscle strength, flexibility, joint mobility. Increased pain, stiffness, soft tissue restriction. Impaired posture, joint mechanics, balance, gait pattern.     Patient presented with improved gait pattern when compared to the previous treatment visit. Treatment continued to be expanded in order to promote proper gait mechanics. Patient required verbal/tactile cues to accept  weight during RLE midstance. Hence, patient would benefit from continued gait treatment activities during subsequent treatment visits. It was also noted that patient continues to demonstrate weak intrinsic musculature in right foot as well as limited range of motion in right ankle dorsiflexion.     Kirti Is progressing well towards her goals.   Pt prognosis is Good.     Pt will continue to benefit from skilled outpatient physical therapy to address the deficits listed in the problem list box on initial evaluation, provide pt/family education and to maximize pt's level of independence in the home and community environment.     Pt's spiritual, cultural and educational needs considered and pt agreeable to plan of care and goals.     Anticipated barriers to physical therapy: co morbididities     Goals:   Short Term Goals: 2 weeks   (1) patient will be I with HOME EXERCISE PROGRAM (initial, not met)      (2) patient will obtain 16 deg R ankle eversion ACTIVE RANGE OF MOTION to facilitate improved ability to negotiate uneven terrain for attending grand children's sporting events (initial, not met)      (3) patient will obtain 5 deg R ankle DF ACTIVE RANGE OF MOTION to facilitate improved ability to clear obstacles in pathway (initial, not met)      Long Term Goals: 4 weeks   (1) patient will obtain R ankle eversion 4+/5 MMT to indicate improved ability to participate in vacuuming according to previous level of function (initial, not met)      (2) patient will present with 18.8cm circumferential measure at R METs to indicate improved ability to wear 2 inch heels (initial, not met)      (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     Outpatient Physical Therapy 2 times weekly for 4 weeks to include the following interventions: Cervical/Lumbar Traction, Electrical Stimulation re-eval, dry needling, Gait Training, Manual  Therapy, Moist Heat/ Ice, Neuromuscular Re-ed, Patient Education, Self Care, Therapeutic Activities, and Therapeutic Exercise.      Physical therapist and physical therapy assistant(s) met face to face to discuss patient's treatment plan and progress towards established goals. Pt will be seen by a physical therapist minimally every 6th visit or every 30 days.    Damir Stroud, PTA

## 2023-06-27 ENCOUNTER — CLINICAL SUPPORT (OUTPATIENT)
Dept: REHABILITATION | Facility: HOSPITAL | Age: 63
End: 2023-06-27
Payer: COMMERCIAL

## 2023-06-27 DIAGNOSIS — R26.2 DIFFICULTY WALKING: ICD-10-CM

## 2023-06-27 DIAGNOSIS — M20.5X1 MALLET TOE, RIGHT: ICD-10-CM

## 2023-06-27 DIAGNOSIS — M21.621 TAILOR'S BUNION OF RIGHT FOOT: ICD-10-CM

## 2023-06-27 DIAGNOSIS — M20.11 HALLUX ABDUCTOVALGUS, RIGHT: Primary | ICD-10-CM

## 2023-06-27 PROCEDURE — 97116 GAIT TRAINING THERAPY: CPT

## 2023-06-27 PROCEDURE — 97140 MANUAL THERAPY 1/> REGIONS: CPT

## 2023-06-27 PROCEDURE — 97110 THERAPEUTIC EXERCISES: CPT

## 2023-06-27 PROCEDURE — 97112 NEUROMUSCULAR REEDUCATION: CPT

## 2023-06-27 PROCEDURE — 97530 THERAPEUTIC ACTIVITIES: CPT

## 2023-06-27 NOTE — PROGRESS NOTES
[facility]   Physical Therapy Progress Note      Name: Kirti oRmero  Clinic Number: [id number]    Therapy Diagnosis:   Encounter Diagnoses   Name Primary?    Hallux abductovalgus, right Yes    Mallet toe, right     Tailor's bunion of right foot     Difficulty walking      Physician: Arthur Alcocer DPM    Visit Date: 6/27/2023    Physician Orders: PT Eval and Treat   Medical Diagnosis from Referral: Z98.890 (ICD-10-CM) - Postoperative state   M62.81 (ICD-10-CM) - Muscle weakness of lower extremity   Evaluation Date: 6/14/2023  Authorization Period Expiration: 12/21/2023  Plan of Care Expiration: 8/27/2023  Visit # / Visits authorized: 4/20  PTA Visit #: 3/5     Foto #1/3 on 6/14/2023, #2/3 due on 6/29/2023   Time In: 01:00pm  Time Out: 2:40pm  Total Billable Time: 100 minutes    Subjective   HOME EXERCISE PROGRAM: reviewed, reports compliance   Response to previous treatment: no complaint of soreness following progression of exercises last visit   Function: limitation in walking pattern, notices a hitch in her step     Pain: 5/10  Location: right foot, great toe     Objective    6/27/2023:  Observation/posture: incisions at R great toe and 5th MET healing well     Gait/assistive device: Impaired, presents to PHYSICAL THERAPY today with B tennis shoes donned     Range of Motion: AROM:  Ankle Right  Left    Dorsiflexion -3 5   Plantarflexion 47 70   Inversion 35 32   Eversion 10 16      Strength:  Ankle Right  Left    Dorsiflexion 4/5 4+/5   Plantarflexion 4/5 4/5   Inversion 4/5 4/5   Eversion 4-/5 4+/5      Joint Mobility:   TCJ, STJ, MET, 1st met: R hypomobile, painful      Palpation/sensation:   LE light touch sensation: mild altered sensation at incision of dorsum of distal incision      Flexibility:   R Plantar fascia: restricted      Function/balance:   Sit - stand: tba X in 30:     R SLS: tba  L SLS: tba     Edema:   R ankle figure of eight: 47.0 cm  L ankle figure of  eight: 46.5 cm     R ankle joint line: 24.5 cm  L ankle joint line: 22.0 cm     R mets: 20.0 cm  L mets: 18.5 cm     Limitation/Restriction for FOTO Foot Survey     Therapist reviewed FOTO scores for Kirti Romero on 6/14/2023.   FOTO documents entered into EPIC - see Media section.     Limitation Score: 45%  Predicted Score: 27%        Treatment     Bold = performed   Blue = increase reps/resistance or initiated exercise     therapeutic exercises to develop strength, endurance, ROM, flexibility, posture, and core stabilization for 45 minutes:  R ankle alphabet x 1   R ankle PF/DF/EV/IV in long sitting with yellow band: 2x10  Towel crunches, 1x15  Toe yoga, big toe up then small toes up, 1x10  Plantar fascia stretch 3x30 sec  Heel raises with focus on eccentric control: 3x10  Gastroc wall stretch: 2x45 sec  Soleus wall stretch: 2x45 sec  Toe raises: 3x10  Updated written HEP     manual therapy techniques: Joint mobilizations, Manual traction, Myofacial release, Soft tissue Mobilization, and Friction Massage for 15 minutes:  R STM to minimize localized swelling  R Gr III/IV- JM 1st MET, TCJ mobilizations      neuromuscular re-education activities to improve: Balance, Coordination, Kinesthetic, Sense, Proprioception, and Posture for 15 minutes:  Static standing in tandem stance on Airex pad, c/ RLE set posteriorly: 4x45 sec  Single leg standing with RLE, on Airex pad: 3x30 sec     therapeutic activities to improve functional performance for 8 minutes:  Recumbent bike x 8 minutes      gait training to improve functional mobility and safety for 17 minutes, including:  Fwd/bkw weight shifting on fitter board  Step over on Airex pad, with focus on eccentric control: 2x10  Forward cone stepping, R/L LE leading, x 4 laps       Patient Education and Home Exercises       Education provided:   - Patient was educated on all therapeutic interventions performed during today's treatment visit.     Written Home Exercises  Provided: Yes & Patient instructed to cont prior HEP. Exercises were reviewed and Kirti was able to demonstrate them prior to the end of the session.  Kirti demonstrated good  understanding of the education provided. See EMR under Patient Instructions for exercises provided during therapy sessions    Assessment   Patient presents with improved ACTIVE RANGE OF MOTION, muscle strength and functional tolerances since onset of PHYSICAL THERAPY POC. She is progressing well; however, localized swelling persists. Written HOME EXERCISE PROGRAM updated, reviewed, patient demo understanding. Reviewed use of elevation, ice and compression socks for minimizing swelling.     Kirti is making good progress towards meeting set goals.   Pt prognosis is Good.     Pt will continue to benefit from skilled outpatient physical therapy to address the deficits listed in the problem list box on initial evaluation, provide pt/family education and to maximize pt's level of independence in the home and community environment.     Pt's spiritual, cultural and educational needs considered and pt agreeable to plan of care and goals.     Anticipated barriers to physical therapy: co morbididities     Goals:   Short Term Goals: 2 weeks   (1) patient will be I with HOME EXERCISE PROGRAM (progressing, not met, 6/27/2023)      (2) patient will obtain 16 deg R ankle eversion ACTIVE RANGE OF MOTION to facilitate improved ability to negotiate uneven terrain for attending grand children's sporting events (progressing, not met, 6/27/2023)      (3) patient will obtain 5 deg R ankle DF ACTIVE RANGE OF MOTION to facilitate improved ability to clear obstacles in pathway (progressing, not met, 6/27/2023)      Long Term Goals: 4 weeks   (1) patient will obtain R ankle eversion 4+/5 MMT to indicate improved ability to participate in vacuuming according to previous level of function (progressing, not met, 6/27/2023)      (2) patient will present with 18.8cm  circumferential measure at R METs to indicate improved ability to wear 2 inch heels (progressing, not met, 6/27/2023)      (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     Outpatient Physical Therapy 2 times weekly for 4 weeks to include the following interventions: Cervical/Lumbar Traction, Electrical Stimulation re-eval, dry needling, Gait Training, Manual Therapy, Moist Heat/ Ice, Neuromuscular Re-ed, Patient Education, Self Care, Therapeutic Activities, and Therapeutic Exercise.      Physical therapist and physical therapy assistant(s) met face to face to discuss patient's treatment plan and progress towards established goals. Pt will be seen by a physical therapist minimally every 6th visit or every 30 days.    Luzmaria Goncalves, PT     I CERTIFY THE NEED FOR THESE SERVICES FURNISHED UNDER THIS PLAN OF TREATMENT AND WHILE UNDER MY CARE     Physician's comments:           Physician's Signature: ___________________________________________________

## 2023-06-28 NOTE — PLAN OF CARE
[facility]   Physical Therapy Progress Note      Name: Kirti Romero  Clinic Number: [id number]    Therapy Diagnosis:   Encounter Diagnoses   Name Primary?    Hallux abductovalgus, right Yes    Mallet toe, right     Tailor's bunion of right foot     Difficulty walking      Physician: Arthur Alcocer DPM    Visit Date: 6/27/2023    Physician Orders: PT Eval and Treat   Medical Diagnosis from Referral: Z98.890 (ICD-10-CM) - Postoperative state   M62.81 (ICD-10-CM) - Muscle weakness of lower extremity   Evaluation Date: 6/14/2023  Authorization Period Expiration: 12/21/2023  Plan of Care Expiration: 8/27/2023  Visit # / Visits authorized: 4/20  PTA Visit #: 3/5     Foto #1/3 on 6/14/2023, #2/3 due on 6/29/2023   Time In: 01:00pm  Time Out: 2:40pm  Total Billable Time: 100 minutes    Subjective   HOME EXERCISE PROGRAM: reviewed, reports compliance   Response to previous treatment: no complaint of soreness following progression of exercises last visit   Function: limitation in walking pattern, notices a hitch in her step     Pain: 5/10  Location: right foot, great toe     Objective    6/27/2023:  Observation/posture: incisions at R great toe and 5th MET healing well     Gait/assistive device: Impaired, presents to PHYSICAL THERAPY today with B tennis shoes donned     Range of Motion: AROM:  Ankle Right  Left    Dorsiflexion -3 5   Plantarflexion 47 70   Inversion 35 32   Eversion 10 16      Strength:  Ankle Right  Left    Dorsiflexion 4/5 4+/5   Plantarflexion 4/5 4/5   Inversion 4/5 4/5   Eversion 4-/5 4+/5      Joint Mobility:   TCJ, STJ, MET, 1st met: R hypomobile, painful      Palpation/sensation:   LE light touch sensation: mild altered sensation at incision of dorsum of distal incision      Flexibility:   R Plantar fascia: restricted      Function/balance:   Sit - stand: tba X in 30:     R SLS: tba  L SLS: tba     Edema:   R ankle figure of eight: 47.0 cm  L ankle figure of  eight: 46.5 cm     R ankle joint line: 24.5 cm  L ankle joint line: 22.0 cm     R mets: 20.0 cm  L mets: 18.5 cm     Limitation/Restriction for FOTO Foot Survey     Therapist reviewed FOTO scores for Kirti Romero on 6/14/2023.   FOTO documents entered into EPIC - see Media section.     Limitation Score: 45%  Predicted Score: 27%        Treatment     Bold = performed   Blue = increase reps/resistance or initiated exercise     therapeutic exercises to develop strength, endurance, ROM, flexibility, posture, and core stabilization for 45 minutes:  R ankle alphabet x 1   R ankle PF/DF/EV/IV in long sitting with yellow band: 2x10  Towel crunches, 1x15  Toe yoga, big toe up then small toes up, 1x10  Plantar fascia stretch 3x30 sec  Heel raises with focus on eccentric control: 3x10  Gastroc wall stretch: 2x45 sec  Soleus wall stretch: 2x45 sec  Toe raises: 3x10  Updated written HEP     manual therapy techniques: Joint mobilizations, Manual traction, Myofacial release, Soft tissue Mobilization, and Friction Massage for 15 minutes:  R STM to minimize localized swelling  R Gr III/IV- JM 1st MET, TCJ mobilizations      neuromuscular re-education activities to improve: Balance, Coordination, Kinesthetic, Sense, Proprioception, and Posture for 15 minutes:  Static standing in tandem stance on Airex pad, c/ RLE set posteriorly: 4x45 sec  Single leg standing with RLE, on Airex pad: 3x30 sec     therapeutic activities to improve functional performance for 8 minutes:  Recumbent bike x 8 minutes      gait training to improve functional mobility and safety for 17 minutes, including:  Fwd/bkw weight shifting on fitter board  Step over on Airex pad, with focus on eccentric control: 2x10  Forward cone stepping, R/L LE leading, x 4 laps       Patient Education and Home Exercises       Education provided:   - Patient was educated on all therapeutic interventions performed during today's treatment visit.     Written Home Exercises  Provided: Yes & Patient instructed to cont prior HEP. Exercises were reviewed and Kirti was able to demonstrate them prior to the end of the session.  Kirti demonstrated good  understanding of the education provided. See EMR under Patient Instructions for exercises provided during therapy sessions    Assessment   Patient presents with improved ACTIVE RANGE OF MOTION, muscle strength and functional tolerances since onset of PHYSICAL THERAPY POC. She is progressing well; however, localized swelling persists. Written HOME EXERCISE PROGRAM updated, reviewed, patient demo understanding. Reviewed use of elevation, ice and compression socks for minimizing swelling.     Kirti is making good progress towards meeting set goals.   Pt prognosis is Good.     Pt will continue to benefit from skilled outpatient physical therapy to address the deficits listed in the problem list box on initial evaluation, provide pt/family education and to maximize pt's level of independence in the home and community environment.     Pt's spiritual, cultural and educational needs considered and pt agreeable to plan of care and goals.     Anticipated barriers to physical therapy: co morbididities     Goals:   Short Term Goals: 2 weeks   (1) patient will be I with HOME EXERCISE PROGRAM (progressing, not met, 6/27/2023)      (2) patient will obtain 16 deg R ankle eversion ACTIVE RANGE OF MOTION to facilitate improved ability to negotiate uneven terrain for attending grand children's sporting events (progressing, not met, 6/27/2023)      (3) patient will obtain 5 deg R ankle DF ACTIVE RANGE OF MOTION to facilitate improved ability to clear obstacles in pathway (progressing, not met, 6/27/2023)      Long Term Goals: 4 weeks   (1) patient will obtain R ankle eversion 4+/5 MMT to indicate improved ability to participate in vacuuming according to previous level of function (progressing, not met, 6/27/2023)      (2) patient will present with 18.8cm  circumferential measure at R METs to indicate improved ability to wear 2 inch heels (progressing, not met, 6/27/2023)      (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     Outpatient Physical Therapy 2 times weekly for 4 weeks to include the following interventions: Cervical/Lumbar Traction, Electrical Stimulation re-eval, dry needling, Gait Training, Manual Therapy, Moist Heat/ Ice, Neuromuscular Re-ed, Patient Education, Self Care, Therapeutic Activities, and Therapeutic Exercise.      Physical therapist and physical therapy assistant(s) met face to face to discuss patient's treatment plan and progress towards established goals. Pt will be seen by a physical therapist minimally every 6th visit or every 30 days.    Luzmaria oGncalves, PT     I CERTIFY THE NEED FOR THESE SERVICES FURNISHED UNDER THIS PLAN OF TREATMENT AND WHILE UNDER MY CARE     Physician's comments:           Physician's Signature: ___________________________________________________

## 2023-06-29 ENCOUNTER — CLINICAL SUPPORT (OUTPATIENT)
Dept: REHABILITATION | Facility: HOSPITAL | Age: 63
End: 2023-06-29
Payer: COMMERCIAL

## 2023-06-29 DIAGNOSIS — M20.11 HALLUX ABDUCTOVALGUS, RIGHT: Primary | ICD-10-CM

## 2023-06-29 DIAGNOSIS — M21.621 TAILOR'S BUNION OF RIGHT FOOT: ICD-10-CM

## 2023-06-29 DIAGNOSIS — M20.5X1 MALLET TOE, RIGHT: ICD-10-CM

## 2023-06-29 DIAGNOSIS — R26.2 DIFFICULTY WALKING: ICD-10-CM

## 2023-06-29 PROCEDURE — 97112 NEUROMUSCULAR REEDUCATION: CPT

## 2023-06-29 PROCEDURE — 97140 MANUAL THERAPY 1/> REGIONS: CPT

## 2023-06-29 PROCEDURE — 97530 THERAPEUTIC ACTIVITIES: CPT

## 2023-06-29 PROCEDURE — 97116 GAIT TRAINING THERAPY: CPT

## 2023-06-29 PROCEDURE — 97110 THERAPEUTIC EXERCISES: CPT

## 2023-06-29 NOTE — PROGRESS NOTES
[facility]   Physical Therapy Treatment Note      Name: Kirti Romeor  Clinic Number: [id number]    Therapy Diagnosis:   Encounter Diagnoses   Name Primary?    Hallux abductovalgus, right Yes    Mallet toe, right     Tailor's bunion of right foot     Difficulty walking      Physician: Arthur Alcocer DPM    Visit Date: 6/29/2023    Physician Orders: PT Eval and Treat   Medical Diagnosis from Referral: Z98.890 (ICD-10-CM) - Postoperative state   M62.81 (ICD-10-CM) - Muscle weakness of lower extremity   Evaluation Date: 6/14/2023  Authorization Period Expiration: 12/21/2023  Plan of Care Expiration: 8/27/2023  Visit # / Visits authorized: 5/20  PTA Visit #: 3/5     Foto #1/3 on 6/14/2023, #2/3 on 6/29/2023   Time In: 10:09am  Time Out: 11:02am  Total Billable Time: 53 minutes    Subjective   HOME EXERCISE PROGRAM: reviewed, reports compliance   Response to previous treatment: no complaint of soreness following progression of exercises last visit   Function: limitation in walking pattern, notices a hitch in her step     Pain: 5/10  Location: right foot, great toe     Objective    6/27/2023:  Observation/posture: incisions at R great toe and 5th MET healing well     Gait/assistive device: Impaired, presents to PHYSICAL THERAPY today with B tennis shoes donned     Range of Motion: AROM:  Ankle Right  Left    Dorsiflexion -3 5   Plantarflexion 47 70   Inversion 35 32   Eversion 10 16      Strength:  Ankle Right  Left    Dorsiflexion 4/5 4+/5   Plantarflexion 4/5 4/5   Inversion 4/5 4/5   Eversion 4-/5 4+/5      Joint Mobility:   TCJ, STJ, MET, 1st met: R hypomobile, painful      Palpation/sensation:   LE light touch sensation: mild altered sensation at incision of dorsum of distal incision      Flexibility:   R Plantar fascia: restricted      Function/balance:   Sit - stand: tba X in 30:     R SLS: tba  L SLS: tba     Edema:   R ankle figure of eight: 47.0 cm  L ankle figure of eight:  "46.5 cm     R ankle joint line: 24.5 cm  L ankle joint line: 22.0 cm     R mets: 20.0 cm  L mets: 18.5 cm     Limitation/Restriction for FOTO Foot Survey     Therapist reviewed FOTO scores for Kirti Romero on 6/14/2023.   FOTO documents entered into EPIC - see Media section.     Limitation Score: 45%  Predicted Score: 27%        Treatment     Bold = performed   Blue = increase reps/resistance or initiated exercise     therapeutic exercises to develop strength, endurance, ROM, flexibility, posture, and core stabilization for 15 minutes:  R ankle alphabet x 1   R ankle PF/DF/EV/IV in long sitting with yellow band: 2x10  Towel crunches, 1x15  Toe yoga, big toe up then small toes up, 1x10  Plantar fascia stretch 3x30 sec  Heel raises with focus on eccentric control: 3x10  Gastroc wall stretch: 2x45 sec  Soleus wall stretch: 2x45 sec  Toe raises: 3x10     manual therapy techniques: Joint mobilizations, Manual traction, Myofacial release, Soft tissue Mobilization, and Friction Massage for 10 minutes:  R STM to minimize localized swelling  R Gr III/IV- JM 1st MET, TCJ mobilizations      neuromuscular re-education activities to improve: Balance, Coordination, Kinesthetic, Sense, Proprioception, and Posture for 8 minutes:  Static standing in tandem stance on Airex pad, c/ RLE set posteriorly: 4x45 sec  Single leg standing, R/L, airex mat, 15"x5      therapeutic activities to improve functional performance for 8 minutes:  Recumbent bike, 2.5 resistance  x 8 minutes      gait training to improve functional mobility and safety for 12 minutes, including:  Fwd/bkw weight shifting on fitter board, R/L LE leading x 20 each   Step over BOSU ball, with focus on eccentric control: 2x10, L foot stepping onto ball  Forward cone stepping, R/L LE leading, x 4 laps     Patient Education and Home Exercises       Education provided:   - Patient was educated on all therapeutic interventions performed during today's treatment visit. "     Home Exercises Provided: Yes & Patient instructed to cont prior HEP. Exercises were reviewed and Kirti was able to demonstrate them prior to the end of the session.  Kirti demonstrated good  understanding of the education provided. See EMR under Patient Instructions for exercises provided during therapy sessions    Assessment   Continued with emphasis on weight shifting, gait training and manual intervention today. Swelling at dorsum of foot has improved since last visit.     Kirti is making good progress towards meeting set goals.   Pt prognosis is Good.     Pt will continue to benefit from skilled outpatient physical therapy to address the deficits listed in the problem list box on initial evaluation, provide pt/family education and to maximize pt's level of independence in the home and community environment.     Pt's spiritual, cultural and educational needs considered and pt agreeable to plan of care and goals.     Anticipated barriers to physical therapy: co morbididities     Goals:   Short Term Goals: 2 weeks   (1) patient will be I with HOME EXERCISE PROGRAM (progressing, not met, 6/27/2023)      (2) patient will obtain 16 deg R ankle eversion ACTIVE RANGE OF MOTION to facilitate improved ability to negotiate uneven terrain for attending grand children's sporting events (progressing, not met, 6/27/2023)      (3) patient will obtain 5 deg R ankle DF ACTIVE RANGE OF MOTION to facilitate improved ability to clear obstacles in pathway (progressing, not met, 6/27/2023)      Long Term Goals: 4 weeks   (1) patient will obtain R ankle eversion 4+/5 MMT to indicate improved ability to participate in vacuuming according to previous level of function (progressing, not met, 6/27/2023)      (2) patient will present with 18.8cm circumferential measure at R METs to indicate improved ability to wear 2 inch heels (progressing, not met, 6/27/2023)      (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     Outpatient Physical Therapy 2 times weekly for 4 weeks to include the following interventions: Cervical/Lumbar Traction, Electrical Stimulation re-eval, dry needling, Gait Training, Manual Therapy, Moist Heat/ Ice, Neuromuscular Re-ed, Patient Education, Self Care, Therapeutic Activities, and Therapeutic Exercise.      Physical therapist and physical therapy assistant(s) met face to face to discuss patient's treatment plan and progress towards established goals. Pt will be seen by a physical therapist minimally every 6th visit or every 30 days.    Luzmaria Goncalves, PT

## 2023-07-03 ENCOUNTER — CLINICAL SUPPORT (OUTPATIENT)
Dept: REHABILITATION | Facility: HOSPITAL | Age: 63
End: 2023-07-03
Payer: COMMERCIAL

## 2023-07-03 DIAGNOSIS — M20.11 HALLUX ABDUCTOVALGUS, RIGHT: Primary | ICD-10-CM

## 2023-07-03 DIAGNOSIS — M20.5X1 MALLET TOE, RIGHT: ICD-10-CM

## 2023-07-03 DIAGNOSIS — M21.621 TAILOR'S BUNION OF RIGHT FOOT: ICD-10-CM

## 2023-07-03 DIAGNOSIS — R26.2 DIFFICULTY WALKING: ICD-10-CM

## 2023-07-03 PROCEDURE — 97110 THERAPEUTIC EXERCISES: CPT

## 2023-07-03 PROCEDURE — 97112 NEUROMUSCULAR REEDUCATION: CPT

## 2023-07-03 PROCEDURE — 97116 GAIT TRAINING THERAPY: CPT

## 2023-07-03 PROCEDURE — 97140 MANUAL THERAPY 1/> REGIONS: CPT

## 2023-07-03 NOTE — PROGRESS NOTES
[facility]   Physical Therapy Treatment Note      Name: Kirti Romero  Clinic Number: [id number]    Therapy Diagnosis:   Encounter Diagnoses   Name Primary?    Hallux abductovalgus, right Yes    Mallet toe, right     Tailor's bunion of right foot     Difficulty walking      Physician: Arthur Alcocer DPKIANNA    Visit Date: 7/3/2023    Physician Orders: PT Eval and Treat   Medical Diagnosis from Referral: Z98.890 (ICD-10-CM) - Postoperative state   M62.81 (ICD-10-CM) - Muscle weakness of lower extremity   Evaluation Date: 6/14/2023  Authorization Period Expiration: 12/21/2023  Plan of Care Expiration: 8/27/2023  Visit # / Visits authorized: 6/20  PTA Visit #: 3/5     Foto #1/3 on 6/14/2023, #2/3 on 6/29/2023   Time In: 09:04am  Time Out: 10:15am  Total Billable Time: 71 minutes    Subjective   HOME EXERCISE PROGRAM: reviewed, reports compliance   Response to previous treatment: R medial and lateral quads were very sore for 3 days after last session and she had difficulty turning over in bed due to soreness at R quads   Function: feels like there is more mobility in her foot and she has practiced normalized her walking pattern.     Pain: 5/10  Location: right foot, great toe     Objective    6/27/2023:  Observation/posture: incisions at R great toe and 5th MET healing well     Gait/assistive device: Impaired, presents to PHYSICAL THERAPY today with B tennis shoes donned     Range of Motion: AROM:  Ankle Right  Left    Dorsiflexion -3 5   Plantarflexion 47 70   Inversion 35 32   Eversion 10 16      Strength:  Ankle Right  Left    Dorsiflexion 4/5 4+/5   Plantarflexion 4/5 4/5   Inversion 4/5 4/5   Eversion 4-/5 4+/5      Joint Mobility:   TCJ, STJ, MET, 1st met: R hypomobile, painful      Palpation/sensation:   LE light touch sensation: mild altered sensation at incision of dorsum of distal incision      Flexibility:   R Plantar fascia: restricted      Function/balance:   Sit - stand:  "tba X in 30:     R SLS: tba  L SLS: tba     Edema:   R ankle figure of eight: 47.0 cm  L ankle figure of eight: 46.5 cm     R ankle joint line: 24.5 cm  L ankle joint line: 22.0 cm     R mets: 20.0 cm  L mets: 18.5 cm     Limitation/Restriction for FOTO Foot Survey     Therapist reviewed FOTO scores for Kirti Romero on 6/14/2023.   FOTO documents entered into EPIC - see Media section.     Limitation Score: 45%  Predicted Score: 27%        Treatment     Bold = performed   Blue = increase reps/resistance or initiated exercise     therapeutic exercises to develop strength, endurance, ROM, flexibility, posture, and core stabilization for 24 minutes:  R ankle alphabet x 1   R ankle PF/DF/EV/IV in long sitting with yellow band: 2x10  Towel crunches, 1x15  Toe yoga, big toe up then small toes up, 1x10  Plantar fascia stretch 3x30 sec  Heel raises with focus on eccentric control: 3x10  Gastroc wall stretch: 2x45 sec  Soleus wall stretch: 2x45 sec  Toe raises: 3x10  L SL, R knee ext, yellow band at forefoot, pillow between knees, 3x7  R hip flexor stretch, R LE off edge of mat   Written HOME EXERCISE PROGRAM updated, reviewed, patient demo understanding      manual therapy techniques: Joint mobilizations, Manual traction, Myofacial release, Soft tissue Mobilization, and Friction Massage for 15 minutes:  R STM to minimize localized swelling  R Gr III/IV JM 1st MET, TCJ mobilizations      neuromuscular re-education activities to improve: Balance, Coordination, Kinesthetic, Sense, Proprioception, and Posture for 9 minutes:  Static standing in tandem stance on Airex pad, c/ RLE set posteriorly: 4x45 sec  Single leg standing, R/L, airex mat, 20"x5      therapeutic activities to improve functional performance for 5 minutes:  Recumbent bike, 2.5 resistance  x 5 minutes      gait training to improve functional mobility and safety for 18 minutes, including:  Fwd/bkw weight shifting on fitter board, R/L LE leading x 20 each "   Step over BOSU ball, with focus on eccentric control: 2x10, L foot stepping onto ball - attempted, but on hold   Forward cone stepping, R/L LE leading, x 4 laps     Patient Education and Home Exercises       Education provided:   - Patient was educated on all therapeutic interventions performed during today's treatment visit.     Home Exercises Provided: Yes & Patient instructed to cont prior HEP. Exercises were reviewed and Kirti was able to demonstrate them prior to the end of the session.  Kirti demonstrated good  understanding of the education provided. See EMR under Patient Instructions for exercises provided during therapy sessions    Assessment   Step over bosu ball attempted but on hold today as this reproduced patient's comparable sign at R quads; reviewed proper body mechanics for this exercises. R hip flexor stretch with LE off edge of mat implemented today to address elevated symptoms and written HOME EXERCISE PROGRAM updated accordingly. Audible at R 1st toe with MTP, IP abduction JM.     Kirti is making good progress towards meeting set goals.   Pt prognosis is Good.     Pt will continue to benefit from skilled outpatient physical therapy to address the deficits listed in the problem list box on initial evaluation, provide pt/family education and to maximize pt's level of independence in the home and community environment.     Pt's spiritual, cultural and educational needs considered and pt agreeable to plan of care and goals.     Anticipated barriers to physical therapy: co morbididities     Goals:   Short Term Goals: 2 weeks   (1) patient will be I with HOME EXERCISE PROGRAM (progressing, not met, 6/27/2023)      (2) patient will obtain 16 deg R ankle eversion ACTIVE RANGE OF MOTION to facilitate improved ability to negotiate uneven terrain for attending grand children's sporting events (progressing, not met, 6/27/2023)      (3) patient will obtain 5 deg R ankle DF ACTIVE RANGE OF MOTION to facilitate  improved ability to clear obstacles in pathway (progressing, not met, 6/27/2023)      Long Term Goals: 4 weeks   (1) patient will obtain R ankle eversion 4+/5 MMT to indicate improved ability to participate in vacuuming according to previous level of function (progressing, not met, 6/27/2023)      (2) patient will present with 18.8cm circumferential measure at R METs to indicate improved ability to wear 2 inch heels (progressing, not met, 6/27/2023)      (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     Outpatient Physical Therapy 2 times weekly for 4 weeks to include the following interventions: Cervical/Lumbar Traction, Electrical Stimulation re-eval, dry needling, Gait Training, Manual Therapy, Moist Heat/ Ice, Neuromuscular Re-ed, Patient Education, Self Care, Therapeutic Activities, and Therapeutic Exercise.      Physical therapist and physical therapy assistant(s) met face to face to discuss patient's treatment plan and progress towards established goals. Pt will be seen by a physical therapist minimally every 6th visit or every 30 days.    Luzmaria Goncalves, PT

## 2023-07-05 ENCOUNTER — CLINICAL SUPPORT (OUTPATIENT)
Dept: REHABILITATION | Facility: HOSPITAL | Age: 63
End: 2023-07-05
Payer: COMMERCIAL

## 2023-07-05 DIAGNOSIS — R26.2 DIFFICULTY WALKING: ICD-10-CM

## 2023-07-05 DIAGNOSIS — M20.11 HALLUX ABDUCTOVALGUS, RIGHT: Primary | ICD-10-CM

## 2023-07-05 DIAGNOSIS — M20.5X1 MALLET TOE, RIGHT: ICD-10-CM

## 2023-07-05 DIAGNOSIS — M21.621 TAILOR'S BUNION OF RIGHT FOOT: ICD-10-CM

## 2023-07-05 PROCEDURE — 97116 GAIT TRAINING THERAPY: CPT

## 2023-07-05 PROCEDURE — 97112 NEUROMUSCULAR REEDUCATION: CPT

## 2023-07-05 PROCEDURE — 97530 THERAPEUTIC ACTIVITIES: CPT

## 2023-07-05 PROCEDURE — 97110 THERAPEUTIC EXERCISES: CPT

## 2023-07-05 PROCEDURE — 97140 MANUAL THERAPY 1/> REGIONS: CPT

## 2023-07-05 NOTE — PROGRESS NOTES
[facility]   Physical Therapy Treatment Note      Name: Kirti Romero  Clinic Number: [id number]    Therapy Diagnosis:   Encounter Diagnoses   Name Primary?    Hallux abductovalgus, right Yes    Mallet toe, right     Tailor's bunion of right foot     Difficulty walking      Physician: Arthur Alcocer DPM    Visit Date: 7/5/2023    Physician Orders: PT Eval and Treat   Medical Diagnosis from Referral: Z98.890 (ICD-10-CM) - Postoperative state   M62.81 (ICD-10-CM) - Muscle weakness of lower extremity   Evaluation Date: 6/14/2023  Authorization Period Expiration: 12/21/2023  Plan of Care Expiration: 8/27/2023  Visit # / Visits authorized: 6/20  PTA Visit #: 3/5     Foto #1/3 on 6/14/2023, #2/3 on 6/29/2023   Time In: 10:00am  Time Out: 11:05am  Total Billable Time: 65 minutes    Subjective   HOME EXERCISE PROGRAM: reviewed, reports compliance   Response to previous treatment: R shanda feeling better after last session   Function: feels like there is more mobility in her foot and she has practiced normalizing her walking pattern.     Pain: 3/10  Location: right foot, great toe     Objective    6/27/2023:  Observation/posture: incisions at R great toe and 5th MET healing well     Gait/assistive device: Impaired, presents to PHYSICAL THERAPY today with B tennis shoes donned     Range of Motion: AROM:  Ankle Right  Left    Dorsiflexion -3 5   Plantarflexion 47 70   Inversion 35 32   Eversion 10 16      Strength:  Ankle Right  Left    Dorsiflexion 4/5 4+/5   Plantarflexion 4/5 4/5   Inversion 4/5 4/5   Eversion 4-/5 4+/5      Joint Mobility:   TCJ, STJ, MET, 1st met: R hypomobile, painful      Palpation/sensation:   LE light touch sensation: mild altered sensation at incision of dorsum of distal incision      Flexibility:   R Plantar fascia: restricted      Function/balance:   Sit - stand: tba X in 30:     R SLS: tba  L SLS: tba     Edema:   R ankle figure of eight: 47.0 cm  L ankle figure  "of eight: 46.5 cm     R ankle joint line: 24.5 cm  L ankle joint line: 22.0 cm     R mets: 20.0 cm  L mets: 18.5 cm     Limitation/Restriction for FOTO Foot Survey     Therapist reviewed FOTO scores for Kirti Romero  FOTO documents entered into EPIC - see Media section.     Limitation Score: 45%  Predicted Score: 27%        Treatment     Bold = performed   Blue = increase reps/resistance or initiated exercise     therapeutic exercises to develop strength, endurance, ROM, flexibility, posture, and core stabilization for 25 minutes:  R ankle alphabet x 1   R ankle PF/DF/EV/IV in long sitting with red band: 3x10  R marble  x 1   Toe yoga, big toe up then small toes up, 1x10  Plantar fascia stretch 3x30 sec  Heel raises with focus on eccentric control: 3x10  Gastroc wall stretch: 2x45 sec  Soleus wall stretch: 2x45 sec  Toe raises: 3x10     manual therapy techniques: Joint mobilizations, Manual traction, Myofacial release, Soft tissue Mobilization, and Friction Massage for 10 minutes:  R STM to minimize localized swelling  R Gr III/IV JM 1st MET, TCJ mobilizations      neuromuscular re-education activities to improve: Balance, Coordination, Kinesthetic, Sense, Proprioception, and Posture for 8 minutes:  Static standing in tandem stance on Airex pad, c/ RLE set posteriorly: 4x45 sec  Single leg standing, R/L, airex mat, 30"x5      therapeutic activities to improve functional performance for 10 minutes:  Recumbent bike, 3.0 resistance  x 10 minutes      gait training to improve functional mobility and safety for 12 minutes, including:  Fwd/bkw weight shifting on fitter board, R/L LE leading x 30 each   Step over BOSU ball, with focus on eccentric control: 2x10, L foot stepping onto ball - attempted, but on hold   Forward cone stepping, R/L LE leading, x 3 laps each    Patient Education and Home Exercises       Education provided:   - Patient was educated on all therapeutic interventions performed during " today's treatment visit.     Home Exercises Provided: Yes & Patient instructed to cont prior HEP. Exercises were reviewed and Kirti was able to demonstrate them prior to the end of the session.  Kirti demonstrated good  understanding of the education provided. See EMR under Patient Instructions for exercises provided during therapy sessions    Assessment   Patient is able to complete marble pickup with R foot with minimal difficulty. Progressed ankle 4 way to red band and updated band provided for use at home.     Kirti is making good progress towards meeting set goals.   Pt prognosis is Good.     Pt will continue to benefit from skilled outpatient physical therapy to address the deficits listed in the problem list box on initial evaluation, provide pt/family education and to maximize pt's level of independence in the home and community environment.     Pt's spiritual, cultural and educational needs considered and pt agreeable to plan of care and goals.     Anticipated barriers to physical therapy: co morbididities     Goals:   Short Term Goals: 2 weeks   (1) patient will be I with HOME EXERCISE PROGRAM (progressing, not met, 6/27/2023)      (2) patient will obtain 16 deg R ankle eversion ACTIVE RANGE OF MOTION to facilitate improved ability to negotiate uneven terrain for attending grand children's sporting events (progressing, not met, 6/27/2023)      (3) patient will obtain 5 deg R ankle DF ACTIVE RANGE OF MOTION to facilitate improved ability to clear obstacles in pathway (progressing, not met, 6/27/2023)      Long Term Goals: 4 weeks   (1) patient will obtain R ankle eversion 4+/5 MMT to indicate improved ability to participate in vacuuming according to previous level of function (progressing, not met, 6/27/2023)      (2) patient will present with 18.8cm circumferential measure at R METs to indicate improved ability to wear 2 inch heels (progressing, not met, 6/27/2023)      (3) 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     Outpatient Physical Therapy 2 times weekly for 4 weeks to include the following interventions: Cervical/Lumbar Traction, Electrical Stimulation re-eval, dry needling, Gait Training, Manual Therapy, Moist Heat/ Ice, Neuromuscular Re-ed, Patient Education, Self Care, Therapeutic Activities, and Therapeutic Exercise.      Physical therapist and physical therapy assistant(s) met face to face to discuss patient's treatment plan and progress towards established goals. Pt will be seen by a physical therapist minimally every 6th visit or every 30 days.    Luzmaria Goncalves, PT

## 2023-07-10 ENCOUNTER — CLINICAL SUPPORT (OUTPATIENT)
Dept: REHABILITATION | Facility: HOSPITAL | Age: 63
End: 2023-07-10
Payer: COMMERCIAL

## 2023-07-10 DIAGNOSIS — M21.621 TAILOR'S BUNION OF RIGHT FOOT: ICD-10-CM

## 2023-07-10 DIAGNOSIS — M20.5X1 MALLET TOE, RIGHT: ICD-10-CM

## 2023-07-10 DIAGNOSIS — R26.2 DIFFICULTY WALKING: ICD-10-CM

## 2023-07-10 DIAGNOSIS — M20.11 HALLUX ABDUCTOVALGUS, RIGHT: Primary | ICD-10-CM

## 2023-07-10 PROCEDURE — 97530 THERAPEUTIC ACTIVITIES: CPT | Mod: CQ

## 2023-07-10 PROCEDURE — 97110 THERAPEUTIC EXERCISES: CPT | Mod: CQ

## 2023-07-10 PROCEDURE — 97112 NEUROMUSCULAR REEDUCATION: CPT | Mod: CQ

## 2023-07-10 NOTE — PROGRESS NOTES
[facility]   Physical Therapy Treatment Note      Name: Kirti Romero  Clinic Number: [id number]    Therapy Diagnosis:   Encounter Diagnoses   Name Primary?    Hallux abductovalgus, right Yes    Mallet toe, right     Tailor's bunion of right foot     Difficulty walking        Physician: Arthur Alcocer DPM    Visit Date: 7/10/2023    Physician Orders: PT Eval and Treat   Medical Diagnosis from Referral: Z98.890 (ICD-10-CM) - Postoperative state   M62.81 (ICD-10-CM) - Muscle weakness of lower extremity   Evaluation Date: 6/14/2023  Authorization Period Expiration: 12/21/2023  Plan of Care Expiration: 8/27/2023  Visit # / Visits authorized: 6/20  PTA Visit #: 3/5     Foto #1/3 on 6/14/2023, #2/3 on 6/29/2023   Time In: 10:07am  Time Out: 11:07am  Total Billable Time: 60 minutes    Subjective   HOME EXERCISE PROGRAM: reviewed, reports compliance   Response to previous treatment: R shanda feeling better after last session   Function: feels like there is more mobility in her foot and she has practiced normalizing her walking pattern.     Pain: 3/10  Location: right foot, great toe     Objective      Objective Measures updated at progress report unless specified.     Treatment     Bold = performed   Blue = increase reps/resistance or initiated exercise     therapeutic exercises to develop strength, endurance, ROM, flexibility, posture, and core stabilization for 35 minutes:  R ankle alphabet x 1   R ankle PF/DF/EV/IV in long sitting with red band: 2x15  R marble  x 1   Toe yoga, big toe up then small toes up, 1x10  Heel raises with focus on eccentric control: 2x10  Heel raises, B concentric RLE eccentric: 2x10  Gastroc wall stretch: 2x45 sec  Soleus wall stretch: 2x45 sec  Plantar fascia stretch 3x45 sec  Toe raises: 2x15     manual therapy techniques: Joint mobilizations, Manual traction, Myofacial release, Soft tissue Mobilization, and Friction Massage for 0 minutes:  R STM to  "minimize localized swelling  R Gr III/IV JM 1st MET, TCJ mobilizations      neuromuscular re-education activities to improve: Balance, Coordination, Kinesthetic, Sense, Proprioception, and Posture for 10 minutes:  Static standing in tandem stance on Airex pad, c/ RLE set posteriorly: 4x45 sec  Single leg standing, R/L, airex mat, 30"x5   Static standing on BOSU ball, round side up: 2x30 sec  Static standing on BOSU ball, flat side up: 2x30 sec    therapeutic activities to improve functional performance for 10 minutes:  Recumbent bike, 4.5 resistance  x 10 minutes      gait training to improve functional mobility and safety for 5 minutes, including:  Fwd/bkw weight shifting on fitter board, R/L LE leading x 30 each   Step over BOSU ball, with focus on eccentric control: 2x10, L foot stepping onto ball   Forward cone stepping, R/L LE leading, x 3 laps each    Patient Education and Home Exercises       Education provided:   - Patient was educated on all therapeutic interventions performed during today's treatment visit.     Home Exercises Provided: Yes & Patient instructed to cont prior HEP. Exercises were reviewed and Kirti was able to demonstrate them prior to the end of the session.  Kirti demonstrated good  understanding of the education provided. See EMR under Patient Instructions for exercises provided during therapy sessions    Assessment   Patient demonstrates improved gait mechanics upon arrival and during today's therapeutic visit. Progressed to unilateral eccentric RLE heel drops.     Kirti is making good progress towards meeting set goals.   Pt prognosis is Good.     Pt will continue to benefit from skilled outpatient physical therapy to address the deficits listed in the problem list box on initial evaluation, provide pt/family education and to maximize pt's level of independence in the home and community environment.     Pt's spiritual, cultural and educational needs considered and pt agreeable to plan of " care and goals.     Anticipated barriers to physical therapy: co morbididities     Goals:   Short Term Goals: 2 weeks   (1) patient will be I with HOME EXERCISE PROGRAM (progressing, not met, 6/27/2023)      (2) patient will obtain 16 deg R ankle eversion ACTIVE RANGE OF MOTION to facilitate improved ability to negotiate uneven terrain for attending grand children's sporting events (progressing, not met, 6/27/2023)      (3) patient will obtain 5 deg R ankle DF ACTIVE RANGE OF MOTION to facilitate improved ability to clear obstacles in pathway (progressing, not met, 6/27/2023)      Long Term Goals: 4 weeks   (1) patient will obtain R ankle eversion 4+/5 MMT to indicate improved ability to participate in vacuuming according to previous level of function (progressing, not met, 6/27/2023)      (2) patient will present with 18.8cm circumferential measure at R METs to indicate improved ability to wear 2 inch heels (progressing, not met, 6/27/2023)      (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     Outpatient Physical Therapy 2 times weekly for 4 weeks to include the following interventions: Cervical/Lumbar Traction, Electrical Stimulation re-eval, dry needling, Gait Training, Manual Therapy, Moist Heat/ Ice, Neuromuscular Re-ed, Patient Education, Self Care, Therapeutic Activities, and Therapeutic Exercise.      Physical therapist and physical therapy assistant(s) met face to face to discuss patient's treatment plan and progress towards established goals. Pt will be seen by a physical therapist minimally every 6th visit or every 30 days.    Damir Stroud, PTA

## 2023-07-12 ENCOUNTER — CLINICAL SUPPORT (OUTPATIENT)
Dept: REHABILITATION | Facility: HOSPITAL | Age: 63
End: 2023-07-12
Payer: COMMERCIAL

## 2023-07-12 DIAGNOSIS — M20.5X1 MALLET TOE, RIGHT: ICD-10-CM

## 2023-07-12 DIAGNOSIS — M20.11 HALLUX ABDUCTOVALGUS, RIGHT: Primary | ICD-10-CM

## 2023-07-12 DIAGNOSIS — M21.621 TAILOR'S BUNION OF RIGHT FOOT: ICD-10-CM

## 2023-07-12 DIAGNOSIS — R26.2 DIFFICULTY WALKING: ICD-10-CM

## 2023-07-12 PROCEDURE — 97530 THERAPEUTIC ACTIVITIES: CPT | Mod: CQ

## 2023-07-12 PROCEDURE — 97110 THERAPEUTIC EXERCISES: CPT | Mod: CQ

## 2023-07-12 PROCEDURE — 97112 NEUROMUSCULAR REEDUCATION: CPT | Mod: CQ

## 2023-07-12 NOTE — PROGRESS NOTES
[facility]   Physical Therapy Treatment Note      Name: Kirti Romero  Clinic Number: [id number]    Therapy Diagnosis:   No diagnosis found.      Physician: Arthur Alcocer DPM    Visit Date: 7/12/2023    Physician Orders: PT Eval and Treat   Medical Diagnosis from Referral: Z98.890 (ICD-10-CM) - Postoperative state   M62.81 (ICD-10-CM) - Muscle weakness of lower extremity   Evaluation Date: 6/14/2023  Authorization Period Expiration: 12/21/2023  Plan of Care Expiration: 8/27/2023  Visit # / Visits authorized: 7/20  PTA Visit #: 2/5     Foto #1/3 on 6/14/2023, #2/3 on 6/29/2023, #3/3 on 7/12/2023  Time In: 9:45 am  Time Out: 10:55 am  Total Billable Time: 65 minutes    Subjective   HOME EXERCISE PROGRAM: reviewed, reports compliance   Response to previous treatment: increased pain on dorsal foot region    Function: feels like there is more mobility in her foot and she has practiced normalizing her walking pattern.     Pain: 1 or 2/10  Location: right foot, great toe     Objective      Objective Measures updated at progress report unless specified.     Treatment     Bold = performed   Blue = increase reps/resistance or initiated exercise     therapeutic exercises to develop strength, endurance, ROM, flexibility, posture, and core stabilization for 40 minutes:  R ankle alphabet x 1   R ankle PF/DF/EV/IV in long sitting with red band: 2x15  R marble  x 1   Toe yoga, big toe up then small toes up, 3x10  Toe crunches x30  Heel raises with focus on eccentric control: 3x10 (with slightly more weight on affected leg)  Heel raises, B concentric RLE eccentric: on hold secondary to pain  Gastroc wall stretch: 3x1 min  Soleus wall stretch: 2x1 min   Plantar fascia stretch 3x1 min  Toe raises: 2x10     manual therapy techniques: Joint mobilizations, Manual traction, Myofacial release, Soft tissue Mobilization, and Friction Massage for 0 minutes:  R STM to minimize localized swelling  R  "Gr III/IV  1st MET, TCJ mobilizations      neuromuscular re-education activities to improve: Balance, Coordination, Kinesthetic, Sense, Proprioception, and Posture for 10 minutes:  Static standing in tandem stance on Airex pad, c/ RLE set posteriorly: 4x45 sec  Single leg standing, R/L, airex mat, 30"x5   Static standing on BOSU ball, round side up: 2x15 sec  Static standing on BOSU ball, flat side up: 2x15 sec    therapeutic activities to improve functional performance for 10 minutes:  Recumbent bike, 4.5 resistance  x 10 minutes      gait training to improve functional mobility and safety for 5 minutes, including:  Fwd/bkw weight shifting on fitter board, R/L LE leading x 30 each   Step over BOSU ball, with focus on eccentric control: 2x10, L foot stepping onto ball   Forward cone stepping, R/L LE leading, x 3 laps each    Patient Education and Home Exercises       Education provided:   - Patient was educated on all therapeutic interventions performed during today's treatment visit.     Home Exercises Provided: Yes & Patient instructed to cont prior HEP. Exercises were reviewed and Kirti was able to demonstrate them prior to the end of the session.  Kirti demonstrated good  understanding of the education provided. See EMR under Patient Instructions for exercises provided during therapy sessions    Assessment   Patient reported increased dorsal foot pain post last treatment visit. Patient, however, reported that pain subsided the following day. Patient also endorsed pain with unilateral eccentric RLE heel drops. Patient was instructed to discontinue performing heel raises unilaterally and only perform them bilaterally for the time being. Patient continues to stabilize right foot/ankle complex. It was also noted that patient's AROM into dorsiflexion is limited in weightbearing. Hence, patient was instructed to continue stretching gastroc/soleus at home as well as perform dorsiflexion strengthening with resistance.  "     Kirti is making good progress towards meeting set goals.   Pt prognosis is Good.     Pt will continue to benefit from skilled outpatient physical therapy to address the deficits listed in the problem list box on initial evaluation, provide pt/family education and to maximize pt's level of independence in the home and community environment.     Pt's spiritual, cultural and educational needs considered and pt agreeable to plan of care and goals.     Anticipated barriers to physical therapy: co morbididities     Goals:   Short Term Goals: 2 weeks   (1) patient will be I with HOME EXERCISE PROGRAM (progressing, not met, 6/27/2023)      (2) patient will obtain 16 deg R ankle eversion ACTIVE RANGE OF MOTION to facilitate improved ability to negotiate uneven terrain for attending grand children's sporting events (progressing, not met, 6/27/2023)      (3) patient will obtain 5 deg R ankle DF ACTIVE RANGE OF MOTION to facilitate improved ability to clear obstacles in pathway (progressing, not met, 6/27/2023)      Long Term Goals: 4 weeks   (1) patient will obtain R ankle eversion 4+/5 MMT to indicate improved ability to participate in vacuuming according to previous level of function (progressing, not met, 6/27/2023)      (2) patient will present with 18.8cm circumferential measure at R METs to indicate improved ability to wear 2 inch heels (progressing, not met, 6/27/2023)      (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     Outpatient Physical Therapy 2 times weekly for 4 weeks to include the following interventions: Cervical/Lumbar Traction, Electrical Stimulation re-eval, dry needling,  Gait Training, Manual Therapy, Moist Heat/ Ice, Neuromuscular Re-ed, Patient Education, Self Care, Therapeutic Activities, and Therapeutic Exercise.      Physical therapist and physical therapy assistant(s) met face to face to discuss patient's treatment plan and progress towards established goals. Pt will be seen by a physical therapist minimally every 6th visit or every 30 days.    Damir Stroud, PTA

## 2023-07-18 ENCOUNTER — CLINICAL SUPPORT (OUTPATIENT)
Dept: REHABILITATION | Facility: HOSPITAL | Age: 63
End: 2023-07-18
Payer: COMMERCIAL

## 2023-07-18 DIAGNOSIS — R26.2 DIFFICULTY WALKING: ICD-10-CM

## 2023-07-18 DIAGNOSIS — M20.5X1 MALLET TOE, RIGHT: ICD-10-CM

## 2023-07-18 DIAGNOSIS — M21.621 TAILOR'S BUNION OF RIGHT FOOT: ICD-10-CM

## 2023-07-18 DIAGNOSIS — M20.11 HALLUX ABDUCTOVALGUS, RIGHT: Primary | ICD-10-CM

## 2023-07-18 PROCEDURE — 97140 MANUAL THERAPY 1/> REGIONS: CPT

## 2023-07-18 PROCEDURE — 97110 THERAPEUTIC EXERCISES: CPT

## 2023-07-18 PROCEDURE — 97112 NEUROMUSCULAR REEDUCATION: CPT

## 2023-07-18 PROCEDURE — 97530 THERAPEUTIC ACTIVITIES: CPT

## 2023-07-18 NOTE — PROGRESS NOTES
[facility]   Physical Therapy Treatment Note      Name: Kirti Romero  Clinic Number: [id number]    Therapy Diagnosis:   Encounter Diagnoses   Name Primary?    Hallux abductovalgus, right Yes    Mallet toe, right     Tailor's bunion of right foot     Difficulty walking      Physician: Arthur Alcocer DPKIANNA    Visit Date: 7/18/2023    Physician Orders: PT Eval and Treat   Medical Diagnosis from Referral: Z98.890 (ICD-10-CM) - Postoperative state   M62.81 (ICD-10-CM) - Muscle weakness of lower extremity   Evaluation Date: 6/14/2023  Authorization Period Expiration: 12/21/2023  Plan of Care Expiration: 8/27/2023  Visit # / Visits authorized: 8/20  PTA Visit #: 2/5     Foto #1/3 on 6/14/2023, #2/3 on 6/29/2023, #3/3 on 7/12/2023  Time In: 9:04am  Time Out: 10:29am  Total Billable Time: 85 minutes    Subjective   HOME EXERCISE PROGRAM: reviewed, reports compliance   Response to previous treatment: increased pain on dorsal foot region    Function: feels like there is more mobility in her foot and she has practiced normalizing her walking pattern.     Pain: 1 or 2/10  Location: right foot, great toe     Objective    7/14/2023:  Observation/posture: incisions at R great toe and 5th MET healing well     Gait/assistive device: Impaired, presents to PHYSICAL THERAPY today with B tennis shoes donned      Range of Motion: AROM:  Ankle Right  Left    Dorsiflexion -3 5   Plantarflexion 47 70   Inversion 36 32   Eversion 9 16      Strength:  Ankle Right  Left    Dorsiflexion 4/5 4+/5   Plantarflexion 4/5 4/5   Inversion 4/5 4/5   Eversion 4/5 4+/5      Joint Mobility:   TCJ, STJ, MET, 1st met: R hypomobile, painful      Palpation/sensation:   LE light touch sensation: mild altered sensation at incision of dorsum of distal incision      Flexibility:   R Plantar fascia: restricted      Function/balance:   Sit - stand: tba X in 30:     R SLS: tba  L SLS: tba     Edema:   R ankle figure of eight: 47.5  "cm  L ankle figure of eight: 46.5 cm     R ankle joint line: 24.0 cm  L ankle joint line: 22.0 cm     R mets: 19.5cm  L mets: 18.5 cm     Limitation/Restriction for FOTO Foot Survey     Therapist reviewed FOTO scores for Kirti Romero on 6/14/2023.   FOTO documents entered into [employer] - see Media section.     Limitation Score: 45%  Predicted Score: 27%          Treatment     Bold = performed   Blue = increase reps/resistance or initiated exercise     therapeutic exercises to develop strength, endurance, ROM, flexibility, posture, and core stabilization for 31 minutes:  R ankle alphabet x 1   R ankle PF/DF/EV/IV in long sitting with red band: 2x15  R marble  x 1   Toe yoga, big toe up then small toes up, 3x10  Toe crunches, #2 on towel, x30  Heel raises from 4" step, eccentric emphasis at L LE: 3x10   Heel raises, B concentric RLE eccentric: on hold secondary to pain  Gastroc stretch, incline board, 45"x4  Soleus stretch, incline board, 45"x4  Plantar fascia stretch, 30"x5  Toe raises: 2x10     manual therapy techniques: Joint mobilizations, Manual traction, Myofacial release, Soft tissue Mobilization, and Friction Massage for 20 minutes:  R STM to minimize localized swelling  R Gr III/IV JM 1st MET, TCJ mobilizations      neuromuscular re-education activities to improve: Balance, Coordination, Kinesthetic, Sense, Proprioception, and Posture for 20 minutes:  Static standing in tandem stance on Airex pad, c/ RLE set posteriorly: 4x45 sec  Single leg standing, R/L, airex mat, 30"x5   Static standing on BOSU ball, round side up: 2x15 sec  Static standing on BOSU ball, flat side up: 2x15 sec    therapeutic activities to improve functional performance for 8 minutes:  Recumbent bike, 4.5 resistance  x 8 minutes      gait training to improve functional mobility and safety for 6 minutes, including:  Fwd/bkw weight shifting on fitter board, R/L LE leading x 30 each   Step over BOSU ball, with focus on eccentric " control: 2x10, L foot stepping onto ball   Forward cone stepping, R/L LE leading, x 3 laps each    Patient Education and Home Exercises       Education provided:   - Patient was educated on all therapeutic interventions performed during today's treatment visit.     Home Exercises Provided: Yes & Patient instructed to cont prior HEP. Exercises were reviewed and Kirti was able to demonstrate them prior to the end of the session.  Kirti demonstrated good  understanding of the education provided. See EMR under Patient Instructions for exercises provided during therapy sessions    Assessment   Patient continues with improvements in gait pattern with progression of skilled services. R plantar fascia presents with tightness and knotting; addressed with STM using firm tool and localized JM to address tight structures.     Kirti is making good progress towards meeting set goals.   Pt prognosis is Good.     Pt will continue to benefit from skilled outpatient physical therapy to address the deficits listed in the problem list box on initial evaluation, provide pt/family education and to maximize pt's level of independence in the home and community environment.     Pt's spiritual, cultural and educational needs considered and pt agreeable to plan of care and goals.     Anticipated barriers to physical therapy: co morbididities     Goals:   Short Term Goals: 2 weeks   (1) patient will be I with HOME EXERCISE PROGRAM (progressing, not met, 6/27/2023)      (2) patient will obtain 16 deg R ankle eversion ACTIVE RANGE OF MOTION to facilitate improved ability to negotiate uneven terrain for attending grand children's sporting events (progressing, not met, 6/27/2023)      (3) patient will obtain 5 deg R ankle DF ACTIVE RANGE OF MOTION to facilitate improved ability to clear obstacles in pathway (progressing, not met, 6/27/2023)      Long Term Goals: 4 weeks   (1) patient will obtain R ankle eversion 4+/5 MMT to indicate improved ability  to participate in vacuuming according to previous level of function (progressing, not met, 6/27/2023)      (2) patient will present with 18.8cm circumferential measure at R METs to indicate improved ability to wear 2 inch heels (progressing, not met, 6/27/2023)      (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     Outpatient Physical Therapy 2 times weekly for 4 weeks to include the following interventions: Cervical/Lumbar Traction, Electrical Stimulation re-eval, dry needling, Gait Training, Manual Therapy, Moist Heat/ Ice, Neuromuscular Re-ed, Patient Education, Self Care, Therapeutic Activities, and Therapeutic Exercise.      Physical therapist and physical therapy assistant(s) met face to face to discuss patient's treatment plan and progress towards established goals. Pt will be seen by a physical therapist minimally every 6th visit or every 30 days.    Luzmaria Goncalves, PT

## 2023-07-20 ENCOUNTER — OFFICE VISIT (OUTPATIENT)
Dept: PODIATRY | Facility: CLINIC | Age: 63
End: 2023-07-20
Payer: COMMERCIAL

## 2023-07-20 ENCOUNTER — HOSPITAL ENCOUNTER (OUTPATIENT)
Dept: RADIOLOGY | Facility: HOSPITAL | Age: 63
Discharge: HOME | End: 2023-07-20
Attending: PODIATRIST
Payer: COMMERCIAL

## 2023-07-20 VITALS
HEIGHT: 62 IN | HEART RATE: 69 BPM | DIASTOLIC BLOOD PRESSURE: 66 MMHG | SYSTOLIC BLOOD PRESSURE: 126 MMHG | WEIGHT: 106 LBS | BODY MASS INDEX: 19.51 KG/M2

## 2023-07-20 DIAGNOSIS — Z98.890 POSTOPERATIVE STATE: Primary | ICD-10-CM

## 2023-07-20 DIAGNOSIS — Z98.890 POSTOPERATIVE STATE: ICD-10-CM

## 2023-07-20 PROCEDURE — 3008F BODY MASS INDEX DOCD: CPT | Mod: CPTII,S$GLB,, | Performed by: PODIATRIST

## 2023-07-20 PROCEDURE — 73630 X-RAY EXAM OF FOOT: CPT | Mod: 26,RT,, | Performed by: RADIOLOGY

## 2023-07-20 PROCEDURE — 99024 POSTOP FOLLOW-UP VISIT: CPT | Mod: S$GLB,,, | Performed by: PODIATRIST

## 2023-07-20 PROCEDURE — 73630 XR FOOT COMPLETE 3 VIEW RIGHT: ICD-10-PCS | Mod: 26,RT,, | Performed by: RADIOLOGY

## 2023-07-20 PROCEDURE — 1159F MED LIST DOCD IN RCRD: CPT | Mod: CPTII,S$GLB,, | Performed by: PODIATRIST

## 2023-07-20 PROCEDURE — 1160F PR REVIEW ALL MEDS BY PRESCRIBER/CLIN PHARMACIST DOCUMENTED: ICD-10-PCS | Mod: CPTII,S$GLB,, | Performed by: PODIATRIST

## 2023-07-20 PROCEDURE — 99999 PR PBB SHADOW E&M-EST. PATIENT-LVL III: ICD-10-PCS | Mod: PBBFAC,,, | Performed by: PODIATRIST

## 2023-07-20 PROCEDURE — 3078F PR MOST RECENT DIASTOLIC BLOOD PRESSURE < 80 MM HG: ICD-10-PCS | Mod: CPTII,S$GLB,, | Performed by: PODIATRIST

## 2023-07-20 PROCEDURE — 1159F PR MEDICATION LIST DOCUMENTED IN MEDICAL RECORD: ICD-10-PCS | Mod: CPTII,S$GLB,, | Performed by: PODIATRIST

## 2023-07-20 PROCEDURE — 99024 PR POST-OP FOLLOW-UP VISIT: ICD-10-PCS | Mod: S$GLB,,, | Performed by: PODIATRIST

## 2023-07-20 PROCEDURE — 73630 X-RAY EXAM OF FOOT: CPT | Mod: TC,FY,RT

## 2023-07-20 PROCEDURE — 3078F DIAST BP <80 MM HG: CPT | Mod: CPTII,S$GLB,, | Performed by: PODIATRIST

## 2023-07-20 PROCEDURE — 99999 PR PBB SHADOW E&M-EST. PATIENT-LVL III: CPT | Mod: PBBFAC,,, | Performed by: PODIATRIST

## 2023-07-20 PROCEDURE — 3074F PR MOST RECENT SYSTOLIC BLOOD PRESSURE < 130 MM HG: ICD-10-PCS | Mod: CPTII,S$GLB,, | Performed by: PODIATRIST

## 2023-07-20 PROCEDURE — 3008F PR BODY MASS INDEX (BMI) DOCUMENTED: ICD-10-PCS | Mod: CPTII,S$GLB,, | Performed by: PODIATRIST

## 2023-07-20 PROCEDURE — 1160F RVW MEDS BY RX/DR IN RCRD: CPT | Mod: CPTII,S$GLB,, | Performed by: PODIATRIST

## 2023-07-20 PROCEDURE — 3074F SYST BP LT 130 MM HG: CPT | Mod: CPTII,S$GLB,, | Performed by: PODIATRIST

## 2023-07-20 NOTE — PROGRESS NOTES
[facility]   Physical Therapy Treatment Note      Name: Kirti Romero  Clinic Number: [id number]    Therapy Diagnosis:   Encounter Diagnoses   Name Primary?    Hallux abductovalgus, right Yes    Mallet toe, right     Tailor's bunion of right foot     Difficulty walking        Physician: Arthur Alcocer DPM    Visit Date: 7/21/2023    Physician Orders: PT Eval and Treat   Medical Diagnosis from Referral: Z98.890 (ICD-10-CM) - Postoperative state   M62.81 (ICD-10-CM) - Muscle weakness of lower extremity   Evaluation Date: 6/14/2023  Authorization Period Expiration: 12/21/2023  Plan of Care Expiration: 8/27/2023  Visit # / Visits authorized: 8/20  PTA Visit #: 2/5     Foto #1/3 on 6/14/2023, #2/3 on 6/29/2023, #3/3 on 7/12/2023  Time In: 10:00 am  Time Out: 11:10 am  Total Billable Time: 70 minutes    Subjective     Pt reports : She is doing well, it just feels tight under neath her arch . She questioned if she is limited in her mo ility because of scar tissue or because of the hardware in her foot .   HOME EXERCISE PROGRAM: reviewed, reports compliance   Response to previous treatment: she felt good after no pain   Function: feels like there is more mobility in her foot and she has practiced normalizing her walking pattern.     Pain: 1 or 2/10  Location: right foot, great toe     Objective    7/14/2023:  Observation/posture: incisions at R great toe and 5th MET healing well     Gait/assistive device: Impaired, presents to PHYSICAL THERAPY today with B tennis shoes donned      Range of Motion: AROM:  Ankle Right  Left    Dorsiflexion -3 5   Plantarflexion 47 70   Inversion 36 32   Eversion 9 16      Strength:  Ankle Right  Left    Dorsiflexion 4/5 4+/5   Plantarflexion 4/5 4/5   Inversion 4/5 4/5   Eversion 4/5 4+/5      Joint Mobility:   TCJ, STJ, MET, 1st met: R hypomobile, painful      Palpation/sensation:   LE light touch sensation: mild altered sensation at incision of dorsum of  "distal incision      Flexibility:   R Plantar fascia: restricted      Function/balance:   Sit - stand: tba X in 30:     R SLS: tba  L SLS: tba     Edema:   R ankle figure of eight: 47.5 cm  L ankle figure of eight: 46.5 cm     R ankle joint line: 24.0 cm  L ankle joint line: 22.0 cm     R mets: 19.5cm  L mets: 18.5 cm     Limitation/Restriction for FOTO Foot Survey     Therapist reviewed FOTO scores for Kirti Romero on 6/14/2023.   FOTO documents entered into [facility] - see Media section.     Limitation Score: 45%  Predicted Score: 27%          Treatment     Bold = performed   Blue = increase reps/resistance or initiated exercise     therapeutic exercises to develop strength, endurance, ROM, flexibility, posture, and core stabilization for 42 minutes:  R ankle alphabet x 1   R ankle PF/DF/EV/IV in long sitting with red band: 2x15  R marble  x 1   Toe yoga, big toe up then small toes up, 3x10  Toe crunches, #2 on towel, x30  Heel raises from 4" step, eccentric emphasis at L LE: 3x10   Heel raises, B concentric RLE eccentric: on hold secondary to pain  Gastroc stretch, incline board, 45"x4  Soleus stretch, incline board, 45"x4  Plantar fascia stretch, 30"x5  Toe raises: 2x10     manual therapy techniques: Joint mobilizations, Manual traction, Myofacial release, Soft tissue Mobilization, and Friction Massage for 8 minutes:  Scar massage/ cross friction massage - education for home performance   Great toe distraction and gentle passive flexion/ extension - education on home performance     Not performed :   R STM to minimize localized swelling  R Gr III/IV JM 1st MET, TCJ mobilizations      neuromuscular re-education activities to improve: Balance, Coordination, Kinesthetic, Sense, Proprioception, and Posture for 12 minutes:  Static standing in tandem stance on Airex pad, c/ RLE set posteriorly: 4x45 sec  Single leg standing, R/L, airex mat, 30"x5   Static standing on BOSU ball, round side up: 2x15 sec  Static " standing on BOSU ball, flat side up: 2x15 sec    therapeutic activities to improve functional performance for 8 minutes:  Recumbent bike, 4.5 resistance  x 8 minutes      gait training to improve functional mobility and safety for 0 minutes, including:  Fwd/bkw weight shifting on fitter board, R/L LE leading x 30 each   Step over BOSU ball, with focus on eccentric control: 2x10, L foot stepping onto ball   Forward cone stepping, R/L LE leading, x 3 laps each    Patient Education and Home Exercises       Education provided:   - Patient was educated on all therapeutic interventions performed during today's treatment visit.     Home Exercises Provided: Yes & Patient instructed to cont prior HEP. Exercises were reviewed and Kirti was able to demonstrate them prior to the end of the session.  Kirti demonstrated good  understanding of the education provided. See EMR under Patient Instructions for exercises provided during therapy sessions    Assessment     Pt demonstrated a good tolerance to session today and was able to complete with no symptom exacerbation. Discussed scar massage and great toe mobility to address complaints of stiffness in these areas. Good response to above exercises performed and appropriate muscle response noted upon session completion.     Kirti is making good progress towards meeting set goals.   Pt prognosis is Good.     Pt will continue to benefit from skilled outpatient physical therapy to address the deficits listed in the problem list box on initial evaluation, provide pt/family education and to maximize pt's level of independence in the home and community environment.     Pt's spiritual, cultural and educational needs considered and pt agreeable to plan of care and goals.     Anticipated barriers to physical therapy: co morbididities     Goals:   Short Term Goals: 2 weeks   (1) patient will be I with HOME EXERCISE PROGRAM (progressing, not met, 6/27/2023)      (2) patient will obtain 16 deg R  ankle eversion ACTIVE RANGE OF MOTION to facilitate improved ability to negotiate uneven terrain for attending grand children's sporting events (progressing, not met, 6/27/2023)      (3) patient will obtain 5 deg R ankle DF ACTIVE RANGE OF MOTION to facilitate improved ability to clear obstacles in pathway (progressing, not met, 6/27/2023)      Long Term Goals: 4 weeks   (1) patient will obtain R ankle eversion 4+/5 MMT to indicate improved ability to participate in vacuuming according to previous level of function (progressing, not met, 6/27/2023)      (2) patient will present with 18.8cm circumferential measure at R METs to indicate improved ability to wear 2 inch heels (progressing, not met, 6/27/2023)      (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     Outpatient Physical Therapy 2 times weekly for 4 weeks to include the following interventions: Cervical/Lumbar Traction, Electrical Stimulation re-eval, dry needling, Gait Training, Manual Therapy, Moist Heat/ Ice, Neuromuscular Re-ed, Patient Education, Self Care, Therapeutic Activities, and Therapeutic Exercise.      Physical therapist and physical therapy assistant(s) met face to face to discuss patient's treatment plan and progress towards established goals. Pt will be seen by a physical therapist minimally every 6th visit or every 30 days.    Ingris Hanson, PTA

## 2023-07-20 NOTE — PROGRESS NOTES
"Subjective:      Patient ID: Kirti Romero is a 62 y.o. female.    Chief Complaint: Follow-up (Follow up for right foot PMHx of surgery)    Presents today complaining of progressive bunions on both feet.  Relates that she uses toe separators and has been wearing shoes with more room in the toe box to help accommodate her feet.  No pain reported.    02/27/2023:  Returns following completing her x-ray of both feet for surgical consultation.  No new complaints.    04/27/2023:  Post Lapidus bunionectomy with Taran osteotomy, flexor tenotomy 4th toe and tailor's bunionectomy 5th metatarsal with flexor tenotomy 5th toe right foot on 04/21/2023.  Doing well.  No significant pain reported.  States that the dressing did caused some irritation along the front of the ankle.  Dressing has remained intact.  Nonweightbearing to the right lower extremity with Cam boot and rolling knee scooter.  Accompanied by her .    05/11/2023: 3 weeks postop.   No pain reported.  She is attempted to place some weight down however has some anxiety.  She is using a rolling knee scooter.    06/13/2023:  7 weeks postop.  No pain reported.  Patient is permitted to be weight-bearing with Cam boot and walker however presents for rolling knee scooter today.  Accompanied by her .    07/20/2023:  Nearly 3 months postop.  Reports that she is attending physical therapy and is going very well.  She reports intermittent discomfort pointing along the medial arch of the left foot.  No significant pain with standing and walking.  Utilizing tennis shoes as advised.    Vitals:    07/20/23 1302   BP: 126/66   Pulse: 69   Weight: 48.1 kg (106 lb)   Height: 5' 2" (1.575 m)   PainSc: 0-No pain      Past Medical History:   Diagnosis Date    Abnormal Pap smear     Treated with Hysterectomy    Basal cell carcinoma     Breast cyst 03/01/2016    rt breast    Breast disorder        Past Surgical History:   Procedure Laterality Date    AUGMENTATION OF " BREAST Bilateral 2003    BREAST BIOPSY Right 03/01/2016    cyst that popped    BREAST SURGERY Bilateral 06/01/2003    COLONOSCOPY N/A 9/22/2020    Procedure: COLONOSCOPY;  Surgeon: Alberto Santos MD;  Location: [facility] ENDO ([address] [place] [address]);  Service: Endoscopy;  Laterality: N/A;  COVID [facility] 9[place]/20 [facility]    HYSTERECTOMY  09/1998    full 38    LAPIDUS BUNIONECTOMY Right 4/21/2023    Procedure: BUNIONECTOMY, LAPIDUS;  Surgeon: Arthur Alcocer DPM;  Location: [facility] OR;  Service: Podiatry;  Laterality: Right;  mini c-arm, Lapiplasty set([place]) notified cc  bunionectomy samantha notified cc    OOPHORECTOMY  1998    MARGARET BUNIONECTOMY Right 4/21/2023    Procedure: EXCISION, BUNIONETTE;  Surgeon: Arthur Alcocer DPM;  Location: [facility] OR;  Service: Podiatry;  Laterality: Right;  5th toe    MARGARET BUNIONECTOMY Right 4/21/2023    Procedure: EXCISION, BUNIONETTE;  Surgeon: Arthur Alcocer DPM;  Location: [facility] OR;  Service: Podiatry;  Laterality: Right;  Arthrex screws    TENOTOMY Right 4/21/2023    Procedure: TENOTOMY;  Surgeon: Arthur Alcocer DPM;  Location: [facility] OR;  Service: Podiatry;  Laterality: Right;  4th toe    TONSILLECTOMY         Family History   Problem Relation Age of Onset    Diabetes Mother     Stroke Paternal Grandfather     Breast cancer Paternal Grandmother     Amblyopia Neg Hx     Blindness Neg Hx     Cancer Neg Hx     Cataracts Neg Hx     Glaucoma Neg Hx     Hypertension Neg Hx     Macular degeneration Neg Hx     Retinal detachment Neg Hx     Strabismus Neg Hx     Thyroid disease Neg Hx     Melanoma Neg Hx        Social History     Socioeconomic History    Marital status:    Tobacco Use    Smoking status: Never    Smokeless tobacco: Never   Substance and Sexual Activity    Alcohol use: No     Comment: very rarely    Drug use: No    Sexual activity: Yes     Partners: Male     Comment:      Social Determinants of Health     Financial Resource Strain: Low Risk      Difficulty of Paying Living Expenses: Not hard at all   Food Insecurity: No Food Insecurity    Worried About Running Out of Food in the Last Year: Never true    Ran Out of Food in the Last Year: Never true   Transportation Needs: No Transportation Needs    Lack of Transportation (Medical): No    Lack of Transportation (Non-Medical): No   Physical Activity: Insufficiently Active    Days of Exercise per Week: 2 days    Minutes of Exercise per Session: 20 min   Stress: No Stress Concern Present    Feeling of Stress : Only a little   Social Connections: Unknown    Frequency of Communication with Friends and Family: More than three times a week    Frequency of Social Gatherings with Friends and Family: Twice a week    Active Member of Clubs or Organizations: Yes    Attends Club or Organization Meetings: 1 to 4 times per year    Marital Status:    Housing Stability: Low Risk     Unable to Pay for Housing in the Last Year: No    Number of Places Lived in the Last Year: 1    Unstable Housing in the Last Year: No       Current Outpatient Medications   Medication Sig Dispense Refill    CA CITRATE/MGOX/VIT D3/B6/MIN (CALCIUM CITRATE + D WITH MAG ORAL) Take by mouth.      cyanocobalamin (VITAMIN B-12) 1000 MCG tablet Take 100 mcg by mouth once daily.      estradioL (ESTRACE) 1 MG tablet Take 1 tablet (1 mg total) by mouth once daily. 90 tablet 3    hydrOXYzine HCL (ATARAX) 25 MG tablet 1/2 to 1 nightly prn. 30 tablet 3    levothyroxine (SYNTHROID) 75 MCG tablet Take 1 tablet by mouth once daily 90 tablet 0    vitamin D (VITAMIN D3) 1000 units Tab Take 1,000 Units by mouth once daily.       Current Facility-Administered Medications   Medication Dose Route Frequency Provider Last Rate Last Admin    sodium chloride 0.9% flush 10 mL  10 mL Intravenous PRN Arthur Alcocer DPM           Review of patient's allergies indicates:  No Known Allergies      Review of Systems   Constitutional: Negative for chills, fever and  malaise/fatigue.   HENT:  Negative for congestion and hearing loss.    Cardiovascular:  Negative for chest pain, claudication and leg swelling.   Respiratory:  Negative for cough and shortness of breath.    Musculoskeletal:  Negative for back pain, joint pain, muscle cramps and muscle weakness.   Gastrointestinal:  Negative for nausea and vomiting.   Neurological:  Negative for numbness, paresthesias and weakness.   Psychiatric/Behavioral:  Negative for altered mental status.          Objective:      Physical Exam  Constitutional:       General: She is not in acute distress.     Appearance: Normal appearance. She is not ill-appearing.   Cardiovascular:      Pulses:           Dorsalis pedis pulses are 2+ on the right side and 2+ on the left side.        Posterior tibial pulses are 2+ on the right side and 2+ on the left side.      Comments: No lower extremity edema bilateral.  Skin temp is warm bilateral foot.  No rubor on dependency bilateral foot.  Musculoskeletal:      Comments: Rectus alignment to the right hallux, 4th and 5th toes right foot.  Previous tailor's bunion is resolved.    No significant pain on palpation to surgical sites right foot.  1 MTP range motion with 45 degrees of dorsiflexion and 0° of plantar flexion without pain.  No audible crepitus with 1 MTP range motion.    Right ankle range motion with near 10° of dorsiflexion with the knee extended.    Semi rigid mild cavus foot structure bilateral foot.  Ankle range of motion appears to be within normal limits.  Subtalar joint range motion somewhat limited.     Skin:     General: Skin is warm.      Capillary Refill: Capillary refill takes less than 2 seconds.      Findings: No ecchymosis or erythema.      Nails: There is no clubbing.      Comments: Normal-appearing scars right foot.  Note small papular lesion at the proximal aspect of the scar overlying the dorsal distal 5th metatarsal shaft/neck which could be the result of a superficial underlying  dissolvable suture.   Neurological:      Mental Status: She is alert and oriented to person, place, and time.      Sensory: Sensation is intact.      Motor: Motor function is intact.             Assessment:       Encounter Diagnosis   Name Primary?    Postoperative state Yes         Plan:       Kirti Nagel was seen today for follow-up.    Diagnoses and all orders for this visit:    Postoperative state  -     X-Ray Foot Complete Right; Future      I counseled the patient on her conditions, their implications and medical management.    Reviewed right foot x-ray noting fully consolidated 1st metatarsocuneiform joint arthrodesis with intact fixation and and also fully consolidated Akin osteotomy and 5th metatarsal osteotomy sites.  Overall maintained alignment of the 1st and 5th rays right foot.  Rectus alignment of the 1 MTP right foot.  We had also discussed that she has some arthritic changes to the 1 MTP however she is asymptomatic we will continue to monitor this.    Patient may continue increasing activity as tolerated.  We reviewed passive plantar flexion of the right hallux in order to help improve purchase of the great toe in addition to continued physical therapy.  No restrictions at this time.      RTC within 3 months to do final follow-up weight-bearing x-ray.    A portion of this note was generated by voice recognition software and may contain spelling and grammar errors.      .

## 2023-07-21 ENCOUNTER — CLINICAL SUPPORT (OUTPATIENT)
Dept: REHABILITATION | Facility: HOSPITAL | Age: 63
End: 2023-07-21
Payer: COMMERCIAL

## 2023-07-21 DIAGNOSIS — M20.11 HALLUX ABDUCTOVALGUS, RIGHT: Primary | ICD-10-CM

## 2023-07-21 DIAGNOSIS — M21.621 TAILOR'S BUNION OF RIGHT FOOT: ICD-10-CM

## 2023-07-21 DIAGNOSIS — R26.2 DIFFICULTY WALKING: ICD-10-CM

## 2023-07-21 DIAGNOSIS — M20.5X1 MALLET TOE, RIGHT: ICD-10-CM

## 2023-07-21 PROCEDURE — 97110 THERAPEUTIC EXERCISES: CPT | Mod: CQ

## 2023-07-21 PROCEDURE — 97530 THERAPEUTIC ACTIVITIES: CPT | Mod: CQ

## 2023-07-21 PROCEDURE — 97112 NEUROMUSCULAR REEDUCATION: CPT | Mod: CQ

## 2023-07-21 PROCEDURE — 97140 MANUAL THERAPY 1/> REGIONS: CPT | Mod: CQ

## 2023-07-24 NOTE — PROGRESS NOTES
[facility]   Physical Therapy Treatment Note      Name: Kirti Romero  Clinic Number: [id number]    Therapy Diagnosis:   Encounter Diagnoses   Name Primary?    Hallux abductovalgus, right Yes    Mallet toe, right     Tailor's bunion of right foot     Difficulty walking      Physician: Arthur Alcocer DPM    Visit Date: 7/25/2023    Physician Orders: PT Eval and Treat   Medical Diagnosis from Referral: Z98.890 (ICD-10-CM) - Postoperative state   M62.81 (ICD-10-CM) - Muscle weakness of lower extremity   Evaluation Date: 6/14/2023  Authorization Period Expiration: 12/21/2023  Plan of Care Expiration: 8/27/2023  Visit # / Visits authorized: 13/20  PTA Visit #: 2/5     Foto #1/3 on 6/14/2023, #2/3 on 6/29/2023, #3/3 on 7/12/2023  Time In: 10:05am  Time Out: 11:45pm  Total Billable Time: 100 minutes    Subjective     HOME EXERCISE PROGRAM: reviewed, reports compliance   Response to previous treatment: pain at anterior ankle and underneath great toe   Function: feels like there is more mobility in her foot and she has practiced normalizing her walking pattern.     Pain: 1 or 2/10  Location: right foot, great toe     Objective    7/14/2023:  Observation/posture: incisions at R great toe and 5th MET healing well     Gait/assistive device: Impaired, presents to PHYSICAL THERAPY today with B tennis shoes donned      Range of Motion: AROM:  Ankle Right  Left    Dorsiflexion -3 5   Plantarflexion 47 70   Inversion 36 32   Eversion 9 16      Strength:  Ankle Right  Left    Dorsiflexion 4/5 4+/5   Plantarflexion 4/5 4/5   Inversion 4/5 4/5   Eversion 4/5 4+/5      Joint Mobility:   TCJ, STJ, MET, 1st met: R hypomobile, painful      Palpation/sensation:   LE light touch sensation: mild altered sensation at incision of dorsum of distal incision      Flexibility:   R Plantar fascia: restricted      Function/balance:   Sit - stand: tba X in 30:     R SLS: tba  L SLS: tba     Edema:   R ankle figure  "of eight: 47.5 cm  L ankle figure of eight: 46.5 cm     R ankle joint line: 24.0 cm  L ankle joint line: 22.0 cm     R mets: 19.5cm  L mets: 18.5 cm     Limitation/Restriction for FOTO Foot Survey     Therapist reviewed FOTO scores for Kirti Romero on 6/14/2023.   FOTO documents entered into EPIC - see Media section.     Limitation Score: 45%  Predicted Score: 27%          Treatment     Bold = performed   Blue = increase reps/resistance or initiated exercise     therapeutic exercises to develop strength, endurance, ROM, flexibility, posture, and core stabilization for 54 minutes:  R ankle alphabet x 1   R ankle PF/DF/EV/IV in long sitting with red band: 2x15  R marble  x 1   Toe yoga, big toe up then small toes up, 3x10  Toe crunches, #2 on towel, x30  Heel raises from 4" step, eccentric emphasis at L LE: 3x10   Heel raises, B concentric RLE eccentric: on hold secondary to pain  Gastroc stretch, incline board, 45"x4  Soleus stretch, incline board, 45"x4  Plantar fascia stretch, 30"x5  Toe raises: 2x10  +Written HOME EXERCISE PROGRAM updated, reviewed, patient demo understanding      manual therapy techniques: Joint mobilizations, Manual traction, Myofacial release, Soft tissue Mobilization, and Friction Massage for 10 minutes:  R STM to minimize localized swelling  R Gr III/IV JM 1st MET, TCJ mobilizations      neuromuscular re-education activities to improve: Balance, Coordination, Kinesthetic, Sense, Proprioception, and Posture for 15 minutes:  Static standing in tandem stance on Airex pad, c/ RLE set posteriorly: 4x45 sec  Single leg standing, R/L, airex mat, 30"x5   Static standing on BOSU ball, round side up: 2x15 sec  Static standing on BOSU ball, flat side up: 2x15 sec    therapeutic activities to improve functional performance for 8 minutes:  Upright bike, 3.5 resistance  x 8 minutes      gait training to improve functional mobility and safety for 25 minutes, including:  Fwd/bkw weight shifting " on fitter board, R/L LE leading x 30 each   Step over BOSU ball, with focus on eccentric control: 2x10, L foot stepping onto ball   Forward cone stepping, R/L LE leading, x 3 laps each    Patient Education and Home Exercises       Education provided:   - Patient was educated on all therapeutic interventions performed during today's treatment visit.     Home Exercises Provided: Yes & Patient instructed to cont prior HEP. Exercises were reviewed and Kirti was able to demonstrate them prior to the end of the session.  Kirti demonstrated good  understanding of the education provided. See EMR under Patient Instructions for exercises provided during therapy sessions    Assessment   Audible followed by increased motion at great toe during AP/PA JM today. Decreased pain underneath MET with use of step for completion of heel raise.     Kirti is making good progress towards meeting set goals.   Pt prognosis is Good.     Pt will continue to benefit from skilled outpatient physical therapy to address the deficits listed in the problem list box on initial evaluation, provide pt/family education and to maximize pt's level of independence in the home and community environment.     Pt's spiritual, cultural and educational needs considered and pt agreeable to plan of care and goals.     Anticipated barriers to physical therapy: co morbididities     Goals:   Short Term Goals: 2 weeks   (1) patient will be I with HOME EXERCISE PROGRAM (progressing, not met, 6/27/2023)      (2) patient will obtain 16 deg R ankle eversion ACTIVE RANGE OF MOTION to facilitate improved ability to negotiate uneven terrain for attending grand children's sporting events (progressing, not met, 6/27/2023)      (3) patient will obtain 5 deg R ankle DF ACTIVE RANGE OF MOTION to facilitate improved ability to clear obstacles in pathway (progressing, not met, 6/27/2023)      Long Term Goals: 4 weeks   (1) patient will obtain R ankle eversion 4+/5 MMT to indicate  improved ability to participate in vacuuming according to previous level of function (progressing, not met, 6/27/2023)      (2) patient will present with 18.8cm circumferential measure at R METs to indicate improved ability to wear 2 inch heels (progressing, not met, 6/27/2023)      (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     Outpatient Physical Therapy 2 times weekly for 4 weeks to include the following interventions: Cervical/Lumbar Traction, Electrical Stimulation re-eval, dry needling, Gait Training, Manual Therapy, Moist Heat/ Ice, Neuromuscular Re-ed, Patient Education, Self Care, Therapeutic Activities, and Therapeutic Exercise.      Physical therapist and physical therapy assistant(s) met face to face to discuss patient's treatment plan and progress towards established goals. Pt will be seen by a physical therapist minimally every 6th visit or every 30 days.    Luzmaria Goncalves, PT

## 2023-07-25 ENCOUNTER — CLINICAL SUPPORT (OUTPATIENT)
Dept: REHABILITATION | Facility: HOSPITAL | Age: 63
End: 2023-07-25
Payer: COMMERCIAL

## 2023-07-25 DIAGNOSIS — M21.621 TAILOR'S BUNION OF RIGHT FOOT: ICD-10-CM

## 2023-07-25 DIAGNOSIS — R26.2 DIFFICULTY WALKING: ICD-10-CM

## 2023-07-25 DIAGNOSIS — M20.5X1 MALLET TOE, RIGHT: ICD-10-CM

## 2023-07-25 DIAGNOSIS — M20.11 HALLUX ABDUCTOVALGUS, RIGHT: Primary | ICD-10-CM

## 2023-07-25 PROCEDURE — 97116 GAIT TRAINING THERAPY: CPT

## 2023-07-25 PROCEDURE — 97112 NEUROMUSCULAR REEDUCATION: CPT

## 2023-07-25 PROCEDURE — 97110 THERAPEUTIC EXERCISES: CPT

## 2023-07-25 PROCEDURE — 97140 MANUAL THERAPY 1/> REGIONS: CPT

## 2023-07-25 PROCEDURE — 97530 THERAPEUTIC ACTIVITIES: CPT

## 2023-07-27 ENCOUNTER — CLINICAL SUPPORT (OUTPATIENT)
Dept: REHABILITATION | Facility: HOSPITAL | Age: 63
End: 2023-07-27
Payer: COMMERCIAL

## 2023-07-27 DIAGNOSIS — M21.621 TAILOR'S BUNION OF RIGHT FOOT: ICD-10-CM

## 2023-07-27 DIAGNOSIS — R26.2 DIFFICULTY WALKING: ICD-10-CM

## 2023-07-27 DIAGNOSIS — M20.5X1 MALLET TOE, RIGHT: ICD-10-CM

## 2023-07-27 DIAGNOSIS — M20.11 HALLUX ABDUCTOVALGUS, RIGHT: Primary | ICD-10-CM

## 2023-07-27 PROCEDURE — 97110 THERAPEUTIC EXERCISES: CPT | Mod: CQ

## 2023-07-27 PROCEDURE — 97530 THERAPEUTIC ACTIVITIES: CPT | Mod: CQ

## 2023-07-27 PROCEDURE — 97140 MANUAL THERAPY 1/> REGIONS: CPT | Mod: CQ

## 2023-07-27 PROCEDURE — 97116 GAIT TRAINING THERAPY: CPT | Mod: CQ

## 2023-07-27 NOTE — PROGRESS NOTES
"[facility]   Physical Therapy Treatment Note      Name: Kirti Romero  Clinic Number: [id number]    Therapy Diagnosis:   Encounter Diagnoses   Name Primary?    Hallux abductovalgus, right Yes    Mallet toe, right     Tailor's bunion of right foot     Difficulty walking        Physician: Arthur Alcocer DPM    Visit Date: 7/27/2023    Physician Orders: PT Eval and Treat   Medical Diagnosis from Referral: Z98.890 (ICD-10-CM) - Postoperative state   M62.81 (ICD-10-CM) - Muscle weakness of lower extremity   Evaluation Date: 6/14/2023  Authorization Period Expiration: 12/21/2023  Plan of Care Expiration: 8/27/2023  Visit # / Visits authorized: 14/20  PTA Visit #: 1/5     Foto #1/3 on 6/14/2023, #2/3 on 6/29/2023, #3/3 on 7/12/2023  Time In: 10:10 am  Time Out: 11:05 am  Total Billable Time: 55 minutes    Subjective     Pt reports: pinching in her skin in the front of her ankle joint when descending stairs.     She is doing well, it just feels tight under neath her arch . She questioned if she is limited in her moility because of scar tissue or because of the hardware in her foot.   HOME EXERCISE PROGRAM: reviewed, reports compliance   Response to previous treatment: she felt good after no pain   Function: feels like there is more mobility in her foot and she has practiced normalizing her walking pattern.     Pain: 0/10  Location: right foot, great toe     Objective      Objective Measures updated at progress report unless specified.     Treatment     Bold = performed   Blue = increase reps/resistance or initiated exercise     therapeutic exercises to develop strength, endurance, ROM, flexibility, posture, and core stabilization for 22 minutes:  R ankle alphabet x 1   R ankle PF/DF/EV/IV in long sitting with red band: 2x15  R marble  x 1   Toe yoga, big toe up then small toes up, 3x10  Toe crunches, #2 on towel, x30  Heel raises from 4" step, eccentric emphasis at L LE: 3x10 " "  Heel raises, B concentric RLE eccentric: on hold secondary to pain  Gastroc stretch, incline board, 60"x2  Soleus stretch, incline board, 60"x4  Plantar fascia stretch, 30"x5  Toe raises: 2x10  Toe raises with knees slightly flexed  during ascent + TKE during descent: 2x10     manual therapy techniques: Joint mobilizations, Manual traction, Myofacial release, Soft tissue Mobilization, and Friction Massage for 8 minutes:  R Gr III/IV JM TCJ mobilizations   Great toe distraction and gentle passive flexion/ extension     Not performed:   Scar massage/ cross friction massage - education for home performance   R STM to minimize localized swelling  R Gr III/IV JM 1st MET     neuromuscular re-education activities to improve: Balance, Coordination, Kinesthetic, Sense, Proprioception, and Posture for 0 minutes:  Static standing in tandem stance on Airex pad, c/ RLE set posteriorly: 4x45 sec  Single leg standing, R/L, airex mat, 30"x5   Static standing on BOSU ball, round side up: 2x15 sec  Static standing on BOSU ball, flat side up: 2x15 sec    therapeutic activities to improve functional performance for 8 minutes:  Upright bike, 3.5 resistance  x 8 minutes      gait training to improve functional mobility and safety for 17 minutes, including:  Fwd/bkw weight shifting on fitter board, R/L LE leading x 30 each   Step over BOSU ball, with focus on eccentric control: 2x10, L foot stepping onto ball   Forward cone stepping, R/L LE leading, x 3 laps each    Patient Education and Home Exercises       Education provided:   - Patient was educated on all therapeutic interventions performed during today's treatment visit.     Home Exercises Provided: Yes & Patient instructed to cont prior HEP. Exercises were reviewed and Kirti was able to demonstrate them prior to the end of the session.  Kirti demonstrated good  understanding of the education provided. See EMR under Patient Instructions for exercises provided during therapy " sessions    Assessment   Pt demonstrated a good tolerance to session today and was able to complete with no symptom exacerbation. Good response to above exercises performed and appropriate muscle response noted upon session completion.     Kirti is making good progress towards meeting set goals.   Pt prognosis is Good.     Pt will continue to benefit from skilled outpatient physical therapy to address the deficits listed in the problem list box on initial evaluation, provide pt/family education and to maximize pt's level of independence in the home and community environment.     Pt's spiritual, cultural and educational needs considered and pt agreeable to plan of care and goals.     Anticipated barriers to physical therapy: co morbididities     Goals:   Short Term Goals: 2 weeks   (1) patient will be I with HOME EXERCISE PROGRAM (progressing, not met, 6/27/2023)      (2) patient will obtain 16 deg R ankle eversion ACTIVE RANGE OF MOTION to facilitate improved ability to negotiate uneven terrain for attending grand children's sporting events (progressing, not met, 6/27/2023)      (3) patient will obtain 5 deg R ankle DF ACTIVE RANGE OF MOTION to facilitate improved ability to clear obstacles in pathway (progressing, not met, 6/27/2023)      Long Term Goals: 4 weeks   (1) patient will obtain R ankle eversion 4+/5 MMT to indicate improved ability to participate in vacuuming according to previous level of function (progressing, not met, 6/27/2023)      (2) patient will present with 18.8cm circumferential measure at R METs to indicate improved ability to wear 2 inch heels (progressing, not met, 6/27/2023)      (3) pt will return to mopping according to previous level of function (progressing, not met, 6/27/2023)    Previous Short Term Goals Status: progressing   New Short Term Goals Status: current remain appropriate   Long Term Goal Status:   progressing   Reasons for Recertification of Therapy: update PHYSICAL THERAPY  POC    Plan   Plan of care Certification: 6/27/2023 to 8/27/2023.     Outpatient Physical Therapy 2 times weekly for 4 weeks to include the following interventions: Cervical/Lumbar Traction, Electrical Stimulation re-eval, dry needling, Gait Training, Manual Therapy, Moist Heat/ Ice, Neuromuscular Re-ed, Patient Education, Self Care, Therapeutic Activities, and Therapeutic Exercise.      Physical therapist and physical therapy assistant(s) met face to face to discuss patient's treatment plan and progress towards established goals. Pt will be seen by a physical therapist minimally every 6th visit or every 30 days.    Damir Stroud, PTA

## 2023-08-01 ENCOUNTER — CLINICAL SUPPORT (OUTPATIENT)
Dept: REHABILITATION | Facility: HOSPITAL | Age: 63
End: 2023-08-01
Payer: COMMERCIAL

## 2023-08-01 DIAGNOSIS — R26.2 DIFFICULTY WALKING: ICD-10-CM

## 2023-08-01 DIAGNOSIS — M20.5X1 MALLET TOE, RIGHT: ICD-10-CM

## 2023-08-01 DIAGNOSIS — M20.11 HALLUX ABDUCTOVALGUS, RIGHT: Primary | ICD-10-CM

## 2023-08-01 DIAGNOSIS — M21.621 TAILOR'S BUNION OF RIGHT FOOT: ICD-10-CM

## 2023-08-01 PROCEDURE — 97110 THERAPEUTIC EXERCISES: CPT

## 2023-08-01 PROCEDURE — 97140 MANUAL THERAPY 1/> REGIONS: CPT

## 2023-08-01 PROCEDURE — 97116 GAIT TRAINING THERAPY: CPT

## 2023-08-01 PROCEDURE — 97112 NEUROMUSCULAR REEDUCATION: CPT

## 2023-08-01 NOTE — PLAN OF CARE
[facility]   Physical Therapy Progress Note      Name: Kirti Romero  Clinic Number: [id number]    Therapy Diagnosis:   Encounter Diagnoses   Name Primary?    Hallux abductovalgus, right Yes    Mallet toe, right     Tailor's bunion of right foot     Difficulty walking      Physician: Arthur Alcocer DPKIANNA    Visit Date: 8/1/2023    Physician Orders: PT Eval and Treat   Medical Diagnosis from Referral: Z98.890 (ICD-10-CM) - Postoperative state   M62.81 (ICD-10-CM) - Muscle weakness of lower extremity   Evaluation Date: 6/14/2023  Authorization Period Expiration: 12/31/2023  Plan of Care Expiration: 9/30/2023  Visit # / Visits authorized: 15/20  PTA Visit #: 1/5     Foto #1/3 on 6/14/2023, #2/3 on 6/29/2023, #3/3 on 7/12/2023  Time In: 10:00am  Time Out: 11:15am  Total Billable Time: 75 minutes    Subjective     HOME EXERCISE PROGRAM: reviewed, reports compliance   Response to previous treatment: felt really sore at B quads after doing dynamic lunges at the end of her last tx session   Function: feels like there is more mobility in her foot and she has practiced normalizing her walking pattern.     Pain: 2/10  Location: plantar surface of R foot at 1st MET     Objective    8/1/2023:  Observation/posture: patient rests in about 5 degrees of R great toe valgus     Gait/assistive device: slight impairment during toe off      Range of Motion: AROM:  Ankle Right  Left    Dorsiflexion 4 5   Plantarflexion 60 70   Inversion 36 32   Eversion 10 16   Great toe  -15 to 5 deg flex        Strength:  Ankle Right  Left    Dorsiflexion 4/5 4+/5   Plantarflexion 4/5 4/5   Inversion 4/5 4/5   Eversion 4/5 4+/5     Joint Mobility:   TCJ, STJ, MET, 1st met: R hypomobile, painful      Palpation/sensation:   LE light touch sensation: mild altered sensation at incision of dorsum of distal incision      Flexibility:   R Plantar fascia: restricted      Function/balance:   R SLS: tba  L SLS: tba     Edema:  "  R ankle figure of eight: 46.0 cm  L ankle figure of eight: 46.5 cm     R ankle joint line: 23.0 cm  L ankle joint line: 22.0 cm     R mets: 19.7 cm  L mets: 18.5 cm     Limitation/Restriction for FOTO Foot Survey     Therapist reviewed FOTO scores for Kirti Romero on 7/18/2023.   FOTO documents entered into EPIC - see Media section.     Limitation Score: 77     Treatment     Bold = performed   Blue = increase reps/resistance or initiated exercise     therapeutic exercises to develop strength, endurance, ROM, flexibility, posture, and core stabilization for 31 minutes:  R ankle PF/DF/EV/IV in long sitting with red band: 2x15  R marble  x 1   Toe yoga, big toe up then small toes up, 3x10  Toe crunches, #2 on towel, x30  Heel raises from 4" step, eccentric emphasis at L LE: 3x10   Heel raises, B concentric RLE eccentric: on hold secondary to pain  Gastroc stretch, incline board, 30"x3  Soleus stretch, incline board, 30"x3  Plantar fascia stretch, incline board, 30"x5  Patient education on nature of current condition and PHYSICAL THERAPY POC   Updated PHYSICAL THERAPY POC      manual therapy techniques: Joint mobilizations, Manual traction, Myofacial release, Soft tissue Mobilization, and Friction Massage for 15 minutes:  R Gr III/IV JM TCJ mobilizations   Great toe distraction and genu varus Gr III/IV JM  R STM to minimize localized swelling and address tightness   R Gr III/IV JM 1st MET     neuromuscular re-education activities to improve: Balance, Coordination, Kinesthetic, Sense, Proprioception, and Posture for 12 minutes:  Static standing in tandem stance on Airex pad, c/ RLE set posteriorly: 4x45 sec  Single leg standing, R, airex mat, 30"x5   Step over on BOSU ball, round side up (R leg on ball, L LE to the ground): 3x10  Static standing on BOSU ball, flat side up: 2x15 sec    therapeutic activities to improve functional performance for 5 minutes:  Upright bike, 5.0 resistance  x 5 minutes    "   gait training to improve functional mobility and safety for 12 minutes, including:  Fwd/bkw weight shifting on [place] fitter board, R LE x 20  Step over BOSU ball, with focus on eccentric control: 2x10, L foot stepping onto ball   Dynamic heel lifts x 2 laps     Patient Education and Home Exercises       Education provided:   - Patient was educated on all therapeutic interventions performed during today's treatment visit.     Home Exercises Provided: Yes & Patient instructed to cont prior HEP. Exercises were reviewed and Kirti was able to demonstrate them prior to the end of the session.  Kirti demonstrated good  understanding of the education provided. See EMR under Patient Instructions for exercises provided during therapy sessions    Assessment   Patient is showing improvement in R ankle/foot ACTIVE RANGE OF MOTION, MMT and edema measures since onset of PHYSICAL THERAPY POC. Her walking pattern and functional tolerances have improved; however, she continues with pain at plantar surface of 1st MET during toe off type movements. This has consistently been her comparable sign.     Kirti is making good progress towards meeting set goals.   Pt prognosis is Good.     Pt will continue to benefit from skilled outpatient physical therapy to address the deficits listed in the problem list box on initial evaluation, provide pt/family education and to maximize pt's level of independence in the home and community environment.     Pt's spiritual, cultural and educational needs considered and pt agreeable to plan of care and goals.     Anticipated barriers to physical therapy: co morbididities     Goals:   Short Term Goals: 2 weeks   (1) patient will be I with HOME EXERCISE PROGRAM (met, 8/1/2023)      (2) patient will obtain 16 deg R ankle eversion ACTIVE RANGE OF MOTION to facilitate improved ability to negotiate uneven terrain for attending grand children's sporting events (ongoing, partially met, 8/1/2023)      (3) patient  will obtain 5 deg R ankle DF ACTIVE RANGE OF MOTION to facilitate improved ability to clear obstacles in pathway (progressing, nearly met, 8/1/2023)      Long Term Goals: 4 weeks   (1) patient will obtain R ankle eversion 4+/5 MMT to indicate improved ability to participate in vacuuming according to previous level of function (progressing, not met, 8/1/2023)      (2) patient will present with 18.8cm circumferential measure at R METs to indicate improved ability to wear 2 inch heels (progressing, not met, 8/1/2023)      (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     Outpatient Physical Therapy 2 times weekly for 2-3 weeks to include the following interventions: Cervical/Lumbar Traction, Electrical Stimulation re-eval, dry needling, Gait Training, Manual Therapy, Moist Heat/ Ice, Neuromuscular Re-ed, Patient Education, Self Care, Therapeutic Activities, and Therapeutic Exercise.      Physical therapist and physical therapy assistant(s) met face to face to discuss patient's treatment plan and progress towards established goals. Pt will be seen by a physical therapist minimally every 6th visit or every 30 days.    Luzmaria Goncalves, PT       I CERTIFY THE NEED FOR THESE SERVICES FURNISHED UNDER THIS PLAN OF TREATMENT AND WHILE UNDER MY CARE     Physician's comments:           Physician's Signature: ___________________________________________________

## 2023-08-01 NOTE — PROGRESS NOTES
[facility]   Physical Therapy Progress Note      Name: Kirti Romero  Clinic Number: [id number]    Therapy Diagnosis:   Encounter Diagnoses   Name Primary?    Hallux abductovalgus, right Yes    Mallet toe, right     Tailor's bunion of right foot     Difficulty walking      Physician: Arthur Alcocer DPKIANNA    Visit Date: 8/1/2023    Physician Orders: PT Eval and Treat   Medical Diagnosis from Referral: Z98.890 (ICD-10-CM) - Postoperative state   M62.81 (ICD-10-CM) - Muscle weakness of lower extremity   Evaluation Date: 6/14/2023  Authorization Period Expiration: 12/31/2023  Plan of Care Expiration: 9/30/2023  Visit # / Visits authorized: 15/20  PTA Visit #: 1/5     Foto #1/3 on 6/14/2023, #2/3 on 6/29/2023, #3/3 on 7/12/2023  Time In: 10:00am  Time Out: 11:15am  Total Billable Time: 75 minutes    Subjective     HOME EXERCISE PROGRAM: reviewed, reports compliance   Response to previous treatment: felt really sore at B quads after doing dynamic lunges at the end of her last tx session   Function: feels like there is more mobility in her foot and she has practiced normalizing her walking pattern.     Pain: 2/10  Location: plantar surface of R foot at 1st MET     Objective    8/1/2023:  Observation/posture: patient rests in about 5 degrees of R great toe valgus     Gait/assistive device: slight impairment during toe off      Range of Motion: AROM:  Ankle Right  Left    Dorsiflexion 4 5   Plantarflexion 60 70   Inversion 36 32   Eversion 10 16   Great toe  -15 to 5 deg flex        Strength:  Ankle Right  Left    Dorsiflexion 4/5 4+/5   Plantarflexion 4/5 4/5   Inversion 4/5 4/5   Eversion 4/5 4+/5     Joint Mobility:   TCJ, STJ, MET, 1st met: R hypomobile, painful      Palpation/sensation:   LE light touch sensation: mild altered sensation at incision of dorsum of distal incision      Flexibility:   R Plantar fascia: restricted      Function/balance:   R SLS: tba  L SLS: tba     Edema:  "  R ankle figure of eight: 46.0 cm  L ankle figure of eight: 46.5 cm     R ankle joint line: 23.0 cm  L ankle joint line: 22.0 cm     R mets: 19.7 cm  L mets: 18.5 cm     Limitation/Restriction for FOTO Foot Survey     Therapist reviewed FOTO scores for Kirti Romero on 7/18/2023.   FOTO documents entered into EPIC - see Media section.     Limitation Score: 77     Treatment     Bold = performed   Blue = increase reps/resistance or initiated exercise     therapeutic exercises to develop strength, endurance, ROM, flexibility, posture, and core stabilization for 31 minutes:  R ankle PF/DF/EV/IV in long sitting with red band: 2x15  R marble  x 1   Toe yoga, big toe up then small toes up, 3x10  Toe crunches, #2 on towel, x30  Heel raises from 4" step, eccentric emphasis at L LE: 3x10   Heel raises, B concentric RLE eccentric: on hold secondary to pain  Gastroc stretch, incline board, 30"x3  Soleus stretch, incline board, 30"x3  Plantar fascia stretch, incline board, 30"x5  Patient education on nature of current condition and PHYSICAL THERAPY POC   Updated PHYSICAL THERAPY POC      manual therapy techniques: Joint mobilizations, Manual traction, Myofacial release, Soft tissue Mobilization, and Friction Massage for 15 minutes:  R Gr III/IV JM TCJ mobilizations   Great toe distraction and genu varus Gr III/IV JM  R STM to minimize localized swelling and address tightness   R Gr III/IV JM 1st MET     neuromuscular re-education activities to improve: Balance, Coordination, Kinesthetic, Sense, Proprioception, and Posture for 12 minutes:  Static standing in tandem stance on Airex pad, c/ RLE set posteriorly: 4x45 sec  Single leg standing, R, airex mat, 30"x5   Step over on BOSU ball, round side up (R leg on ball, L LE to the ground): 3x10  Static standing on BOSU ball, flat side up: 2x15 sec    therapeutic activities to improve functional performance for 5 minutes:  Upright bike, 5.0 resistance  x 5 minutes    "   gait training to improve functional mobility and safety for 12 minutes, including:  Fwd/bkw weight shifting on [place] fitter board, R LE x 20  Step over BOSU ball, with focus on eccentric control: 2x10, L foot stepping onto ball   Dynamic heel lifts x 2 laps     Patient Education and Home Exercises       Education provided:   - Patient was educated on all therapeutic interventions performed during today's treatment visit.     Home Exercises Provided: Yes & Patient instructed to cont prior HEP. Exercises were reviewed and Kirti was able to demonstrate them prior to the end of the session.  Kirti demonstrated good  understanding of the education provided. See EMR under Patient Instructions for exercises provided during therapy sessions    Assessment   Patient is showing improvement in R ankle/foot ACTIVE RANGE OF MOTION, MMT and edema measures since onset of PHYSICAL THERAPY POC. Her walking pattern and functional tolerances have improved; however, she continues with pain at plantar surface of 1st MET during toe off type movements. This has consistently been her comparable sign.     Kirti is making good progress towards meeting set goals.   Pt prognosis is Good.     Pt will continue to benefit from skilled outpatient physical therapy to address the deficits listed in the problem list box on initial evaluation, provide pt/family education and to maximize pt's level of independence in the home and community environment.     Pt's spiritual, cultural and educational needs considered and pt agreeable to plan of care and goals.     Anticipated barriers to physical therapy: co morbididities     Goals:   Short Term Goals: 2 weeks   (1) patient will be I with HOME EXERCISE PROGRAM (met, 8/1/2023)      (2) patient will obtain 16 deg R ankle eversion ACTIVE RANGE OF MOTION to facilitate improved ability to negotiate uneven terrain for attending grand children's sporting events (ongoing, partially met, 8/1/2023)      (3) patient  will obtain 5 deg R ankle DF ACTIVE RANGE OF MOTION to facilitate improved ability to clear obstacles in pathway (progressing, nearly met, 8/1/2023)      Long Term Goals: 4 weeks   (1) patient will obtain R ankle eversion 4+/5 MMT to indicate improved ability to participate in vacuuming according to previous level of function (progressing, not met, 8/1/2023)      (2) patient will present with 18.8cm circumferential measure at R METs to indicate improved ability to wear 2 inch heels (progressing, not met, 8/1/2023)      (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     Outpatient Physical Therapy 2 times weekly for 2-3 weeks to include the following interventions: Cervical/Lumbar Traction, Electrical Stimulation re-eval, dry needling, Gait Training, Manual Therapy, Moist Heat/ Ice, Neuromuscular Re-ed, Patient Education, Self Care, Therapeutic Activities, and Therapeutic Exercise.      Physical therapist and physical therapy assistant(s) met face to face to discuss patient's treatment plan and progress towards established goals. Pt will be seen by a physical therapist minimally every 6th visit or every 30 days.    Luzmaria Goncalves, PT       I CERTIFY THE NEED FOR THESE SERVICES FURNISHED UNDER THIS PLAN OF TREATMENT AND WHILE UNDER MY CARE     Physician's comments:           Physician's Signature: ___________________________________________________

## 2023-08-03 ENCOUNTER — CLINICAL SUPPORT (OUTPATIENT)
Dept: REHABILITATION | Facility: HOSPITAL | Age: 63
End: 2023-08-03
Payer: COMMERCIAL

## 2023-08-03 DIAGNOSIS — M20.11 HALLUX ABDUCTOVALGUS, RIGHT: Primary | ICD-10-CM

## 2023-08-03 DIAGNOSIS — R26.2 DIFFICULTY WALKING: ICD-10-CM

## 2023-08-03 DIAGNOSIS — M20.5X1 MALLET TOE, RIGHT: ICD-10-CM

## 2023-08-03 DIAGNOSIS — M21.621 TAILOR'S BUNION OF RIGHT FOOT: ICD-10-CM

## 2023-08-03 PROCEDURE — 97110 THERAPEUTIC EXERCISES: CPT

## 2023-08-03 PROCEDURE — 97112 NEUROMUSCULAR REEDUCATION: CPT

## 2023-08-03 PROCEDURE — 97140 MANUAL THERAPY 1/> REGIONS: CPT

## 2023-08-03 PROCEDURE — 97116 GAIT TRAINING THERAPY: CPT

## 2023-08-03 NOTE — PROGRESS NOTES
[facility]   Physical Therapy Treatment Note      Name: Kirti Romero  Clinic Number: [id number]    Therapy Diagnosis:   Encounter Diagnoses   Name Primary?    Hallux abductovalgus, right Yes    Mallet toe, right     Tailor's bunion of right foot     Difficulty walking      Physician: Arthur Alcocer DPM    Visit Date: 8/3/2023    Physician Orders: PT Eval and Treat   Medical Diagnosis from Referral: Z98.890 (ICD-10-CM) - Postoperative state   M62.81 (ICD-10-CM) - Muscle weakness of lower extremity   Evaluation Date: 6/14/2023  Authorization Period Expiration: 12/31/2023  Plan of Care Expiration: 9/30/2023  Visit # / Visits authorized: 16/20  PTA Visit #: 1/5     Foto #1/3 on 6/14/2023, #2/3 on 6/29/2023, #3/3 on 7/12/2023 - closed   Time In: 11:03am  Time Out: 12:20pm  Total Billable Time: 77 minutes    Subjective     HOME EXERCISE PROGRAM: reviewed, reports compliance   Response to previous treatment: reports no soreness following last PHYSICAL THERAPY visit that emphasized intrinsics of the foot. Still feels pain at base of great toe when WB  Function: feels like there is more mobility in her foot and she has practiced normalizing her walking pattern.    Pain: 2/10  Location: plantar surface of R foot at 1st MET     Objective    8/1/2023:  Observation/posture: patient rests in about 5 degrees of R great toe valgus     Gait/assistive device: slight impairment during toe off      Range of Motion: AROM:  Ankle Right  Left    Dorsiflexion 4 5   Plantarflexion 60 70   Inversion 36 32   Eversion 10 16   Great toe flex  -15 to 5 deg flex        Strength:  Ankle Right  Left    Dorsiflexion 4/5 4+/5   Plantarflexion 4/5 4/5   Inversion 4/5 4/5   Eversion 4/5 4+/5     Joint Mobility:   TCJ, STJ, MET, 1st met: R hypomobile, painful      Palpation/sensation:   LE light touch sensation: mild altered sensation at incision of dorsum of distal incision      Flexibility:   R Plantar fascia:  "restricted      Function/balance:   R SLS: tba  L SLS: tba     Edema:   R ankle figure of eight: 46.0 cm  L ankle figure of eight: 46.5 cm     R ankle joint line: 23.0 cm  L ankle joint line: 22.0 cm     R mets: 19.7 cm  L mets: 18.5 cm     Limitation/Restriction for FOTO Foot Survey     Therapist reviewed FOTO scores for Kirti Romero on 7/18/2023.   FOTO documents entered into EPIC - see Media section.     Limitation Score: 77     Treatment     Bold = performed   Blue = increase reps/resistance or initiated exercise     therapeutic exercises to develop strength, endurance, ROM, flexibility, posture, and core stabilization for 25 minutes:  R ankle PF/DF/EV/IV in long sitting with red band: 2x15  R marble  x 1 cup  Toe yoga, big toe up then small toes up, 3x10  +Written HOME EXERCISE PROGRAM updated, reviewed, patient demo understanding   Toe crunches, #2 on towel, x30  Heel raises from 4" step, eccentric emphasis at L LE: 3x10   Heel raises, B concentric RLE eccentric: on hold secondary to pain  Gastroc stretch, incline board, 30"x3  Soleus stretch, incline board, 30"x3  Plantar fascia stretch, incline board, 30"x5     manual therapy techniques: Joint mobilizations, Manual traction, Myofacial release, Soft tissue Mobilization, and Friction Massage for 20 minutes:  R Gr III/IV JM TCJ mobilizations   Great toe distraction and genu varus Gr III/IV JM  R STM to minimize localized swelling and address tightness   R Gr III/IV JM 1st MET     neuromuscular re-education activities to improve: Balance, Coordination, Kinesthetic, Sense, Proprioception, and Posture for 15 minutes:  Static standing in tandem stance on Airex pad, 30" hold x 3 each LE  Single leg standing, R, airex mat, 30"x5   Step over on BOSU ball, round side up (R leg on ball, L LE to the ground): 3x10  Static standing on BOSU ball, flat side up: 2x15 sec    therapeutic activities to improve functional performance for 5 minutes:  Upright bike, " 5.0 resistance  x 5 minutes      gait training to improve functional mobility and safety for 12 minutes, including:  Fwd/bkw weight shifting on [employer] board, R LE x 20  Step over BOSU ball, with focus on eccentric control: 2x10, L foot stepping onto ball   Dynamic heel lifts x 2 laps     Patient Education and Home Exercises       Education provided:   - Patient was educated on all therapeutic interventions performed during today's treatment visit.     Home Exercises Provided: Yes & Patient instructed to cont prior HEP. Exercises were reviewed and Kirti was able to demonstrate them prior to the end of the session.  Kirti demonstrated good  understanding of the education provided. See EMR under Patient Instructions for exercises provided during therapy sessions    Assessment   Improved response to dynamic heel lifts as opposed to static with patient report of being able to feel stretch appropriately at plantar fascia while also experiencing decreased pain at 1st MET during weight shifting onto the area. Continued with emphasis on intrinsic muscle strengthening and written HOME EXERCISE PROGRAM updated accordingly.     Kirti is making good progress towards meeting set goals.   Pt prognosis is Good.     Pt will continue to benefit from skilled outpatient physical therapy to address the deficits listed in the problem list box on initial evaluation, provide pt/family education and to maximize pt's level of independence in the home and community environment.     Pt's spiritual, cultural and educational needs considered and pt agreeable to plan of care and goals.     Anticipated barriers to physical therapy: co morbididities     Goals:   Short Term Goals: 2 weeks   (1) patient will be I with HOME EXERCISE PROGRAM (met, 8/1/2023)      (2) patient will obtain 16 deg R ankle eversion ACTIVE RANGE OF MOTION to facilitate improved ability to negotiate uneven terrain for attending grand children's sporting events (ongoing,  partially met, 8/1/2023)      (3) patient will obtain 5 deg R ankle DF ACTIVE RANGE OF MOTION to facilitate improved ability to clear obstacles in pathway (progressing, nearly met, 8/1/2023)      Long Term Goals: 4 weeks   (1) patient will obtain R ankle eversion 4+/5 MMT to indicate improved ability to participate in vacuuming according to previous level of function (progressing, not met, 8/1/2023)      (2) patient will present with 18.8cm circumferential measure at R METs to indicate improved ability to wear 2 inch heels (progressing, not met, 8/1/2023)      (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     Outpatient Physical Therapy 2 times weekly for 2-3 weeks to include the following interventions: Cervical/Lumbar Traction, Electrical Stimulation re-eval, dry needling, Gait Training, Manual Therapy, Moist Heat/ Ice, Neuromuscular Re-ed, Patient Education, Self Care, Therapeutic Activities, and Therapeutic Exercise.      Physical therapist and physical therapy assistant(s) met face to face to discuss patient's treatment plan and progress towards established goals. Pt will be seen by a physical therapist minimally every 6th visit or every 30 days.    Luzmaria Goncalves, PT       I CERTIFY THE NEED FOR THESE SERVICES FURNISHED UNDER THIS PLAN OF TREATMENT AND WHILE UNDER MY CARE     Physician's comments:           Physician's Signature: ___________________________________________________

## 2023-08-07 ENCOUNTER — CLINICAL SUPPORT (OUTPATIENT)
Dept: REHABILITATION | Facility: HOSPITAL | Age: 63
End: 2023-08-07
Payer: COMMERCIAL

## 2023-08-07 DIAGNOSIS — M20.11 HALLUX ABDUCTOVALGUS, RIGHT: Primary | ICD-10-CM

## 2023-08-07 DIAGNOSIS — R26.2 DIFFICULTY WALKING: ICD-10-CM

## 2023-08-07 DIAGNOSIS — M21.621 TAILOR'S BUNION OF RIGHT FOOT: ICD-10-CM

## 2023-08-07 DIAGNOSIS — M20.5X1 MALLET TOE, RIGHT: ICD-10-CM

## 2023-08-07 PROCEDURE — 97530 THERAPEUTIC ACTIVITIES: CPT

## 2023-08-07 PROCEDURE — 97116 GAIT TRAINING THERAPY: CPT

## 2023-08-07 PROCEDURE — 97140 MANUAL THERAPY 1/> REGIONS: CPT

## 2023-08-07 PROCEDURE — 97110 THERAPEUTIC EXERCISES: CPT

## 2023-08-07 NOTE — PROGRESS NOTES
[facility]   Physical Therapy Treatment Note      Name: Kirti Romero  Clinic Number: [id number]    Therapy Diagnosis:   Encounter Diagnoses   Name Primary?    Hallux abductovalgus, right Yes    Mallet toe, right     Tailor's bunion of right foot     Difficulty walking      Physician: Arthur Alcocer DPM    Visit Date: 8/7/2023    Physician Orders: PT Eval and Treat   Medical Diagnosis from Referral: Z98.890 (ICD-10-CM) - Postoperative state   M62.81 (ICD-10-CM) - Muscle weakness of lower extremity   Evaluation Date: 6/14/2023  Authorization Period Expiration: 12/31/2023  Plan of Care Expiration: 9/30/2023  Visit # / Visits authorized: 17/20  PTA Visit #: 1/5     Foto #1/3 on 6/14/2023, #2/3 on 6/29/2023, #3/3 on 7/12/2023 - closed   Time In: 1:04pm  Time Out: 2:22pm  Total Billable Time: 78 minutes    Subjective     HOME EXERCISE PROGRAM: reviewed, reports compliance   Response to previous treatment: reports no soreness following last PHYSICAL THERAPY visit that emphasized intrinsics of the foot. Still feels pain at base of great toe when WB  Function: feels like there is more mobility in her foot and she has practiced normalizing her walking pattern.    Pain: 2/10  Location: plantar surface of R foot at 1st MET     Objective    Observation/posture: patient rests in about 5 degrees of R great toe valgus     Gait/assistive device: slight impairment during toe off      Range of Motion: AROM:  Ankle Right  Left    Dorsiflexion 4 5   Plantarflexion 60 70   Inversion 36 32   Eversion 10 16   Great toe flex  -15 to 5 deg flex        Strength:  Ankle Right  Left    Dorsiflexion 4/5 4+/5   Plantarflexion 4/5 4/5   Inversion 4/5 4/5   Eversion 4/5 4+/5     Joint Mobility:   TCJ, STJ, MET, 1st met: R hypomobile, painful      Palpation/sensation:   LE light touch sensation: mild altered sensation at incision of dorsum of distal incision      Flexibility:   R Plantar fascia: restricted     "  Function/balance:   R SLS: tba  L SLS: tba     Edema:   R ankle figure of eight: 46.0 cm  L ankle figure of eight: 46.5 cm     R ankle joint line: 23.0 cm  L ankle joint line: 22.0 cm     R mets: 19.7 cm  L mets: 18.5 cm     Limitation/Restriction for FOTO Foot Survey     Therapist reviewed FOTO scores for Kirti Romero on 7/18/2023.   FOTO documents entered into EPIC - see Media section.     Limitation Score: 77     Treatment     Bold = performed   Blue = increase reps/resistance or initiated exercise     therapeutic exercises to develop strength, endurance, ROM, flexibility, posture, and core stabilization for 15 minutes:  R ankle PF/DF/EV/IV in long sitting with red band: 2x15  R marble  x 1 cup  Toe yoga, big toe up then small toes up, 3x10  Toe crunches, #2 on towel, x30  Heel raises from 4" step, eccentric emphasis at L LE: 3x10   Heel raises, B concentric RLE eccentric: on hold secondary to pain  Gastroc stretch, incline board, 30"x3  Soleus stretch, incline board, 30"x3  Plantar fascia stretch, incline board, 30"x5     manual therapy techniques: Joint mobilizations, Manual traction, Myofacial release, Soft tissue Mobilization, and Friction Massage for 23 minutes:  R Gr III/IV JM TCJ mobilizations   Great toe distraction and genu varus Gr III/IV JM  R STM to minimize localized swelling and address tightness   R Gr III/IV JM 1st MET     neuromuscular re-education activities to improve: Balance, Coordination, Kinesthetic, Sense, Proprioception, and Posture for 00 minutes:  Static standing in tandem stance on Airex pad, 30" hold x 3 each LE  Single leg standing, R, airex mat, 30"x5   Step over on BOSU ball, round side up (R leg on ball, L LE to the ground): 3x10  Static standing on BOSU ball, flat side up: 2x15 sec    therapeutic activities to improve functional performance for 25 minutes:  Bike, 5.0 resistance  x 5 minutes   +Lunges, R/L LE forward, 2 airex mats on 6inch step, B HRA, x 20 each " "  +Leg press, B LE squat, 5" hold each way, 2 black cords, 2x10   +Heel lifts, B LE s, 3" hold each way, 2x10      gait training to improve functional mobility and safety for 15 minutes, including:  Fwd/bkw weight shifting, patient standing on [place] fitter board, R LE, 3x10   Dynamic heel lifts x 2 laps     Patient Education and Home Exercises       Education provided:   - Patient was educated on all therapeutic interventions performed during today's treatment visit.     Home Exercises Provided: Yes & Patient instructed to cont prior HEP. Exercises were reviewed and Kirti was able to demonstrate them prior to the end of the session.  Kirti demonstrated good  understanding of the education provided. See EMR under Patient Instructions for exercises provided during therapy sessions    Assessment   Emphasis on progression towards unilateral strengthening via leg press machine today. Patient is able to complete with improving tolerance and proper mechanics.     Kirti is making good progress towards meeting set goals.   Pt prognosis is Good.     Pt will continue to benefit from skilled outpatient physical therapy to address the deficits listed in the problem list box on initial evaluation, provide pt/family education and to maximize pt's level of independence in the home and community environment.     Pt's spiritual, cultural and educational needs considered and pt agreeable to plan of care and goals.     Anticipated barriers to physical therapy: co morbididities     Goals:   Short Term Goals: 2 weeks   (1) patient will be I with HOME EXERCISE PROGRAM (met, 8/1/2023)      (2) patient will obtain 16 deg R ankle eversion ACTIVE RANGE OF MOTION to facilitate improved ability to negotiate uneven terrain for attending grand children's sporting events (ongoing, partially met, 8/1/2023)      (3) patient will obtain 5 deg R ankle DF ACTIVE RANGE OF MOTION to facilitate improved ability to clear obstacles in pathway (progressing, " nearly met, 8/1/2023)      Long Term Goals: 4 weeks   (1) patient will obtain R ankle eversion 4+/5 MMT to indicate improved ability to participate in vacuuming according to previous level of function (progressing, not met, 8/1/2023)      (2) patient will present with 18.8cm circumferential measure at R METs to indicate improved ability to wear 2 inch heels (progressing, not met, 8/1/2023)      (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     Outpatient Physical Therapy 2 times weekly for 2-3 weeks to include the following interventions: Cervical/Lumbar Traction, Electrical Stimulation re-eval, dry needling, Gait Training, Manual Therapy, Moist Heat/ Ice, Neuromuscular Re-ed, Patient Education, Self Care, Therapeutic Activities, and Therapeutic Exercise.      Physical therapist and physical therapy assistant(s) met face to face to discuss patient's treatment plan and progress towards established goals. Pt will be seen by a physical therapist minimally every 6th visit or every 30 days.    Luzmaria Goncalves, PT

## 2023-08-09 ENCOUNTER — CLINICAL SUPPORT (OUTPATIENT)
Dept: REHABILITATION | Facility: HOSPITAL | Age: 63
End: 2023-08-09
Payer: COMMERCIAL

## 2023-08-09 DIAGNOSIS — M20.11 HALLUX ABDUCTOVALGUS, RIGHT: Primary | ICD-10-CM

## 2023-08-09 DIAGNOSIS — R26.2 DIFFICULTY WALKING: ICD-10-CM

## 2023-08-09 DIAGNOSIS — M21.621 TAILOR'S BUNION OF RIGHT FOOT: ICD-10-CM

## 2023-08-09 DIAGNOSIS — M20.5X1 MALLET TOE, RIGHT: ICD-10-CM

## 2023-08-09 PROCEDURE — 97140 MANUAL THERAPY 1/> REGIONS: CPT

## 2023-08-09 PROCEDURE — 97530 THERAPEUTIC ACTIVITIES: CPT

## 2023-08-09 PROCEDURE — 97116 GAIT TRAINING THERAPY: CPT

## 2023-08-09 NOTE — PROGRESS NOTES
[facility]ERAP[facility] AND [facility]LL[facility]   Physical Therapy Treatment Note      Name: Kirti Romero  Clinic Number: [id number]    Therapy Diagnosis:   Encounter Diagnoses   Name Primary?    Hallux abductovalgus, right Yes    Mallet toe, right     Tailor's bunion of right foot     Difficulty walking      Physician: Arthur Alcocer DPM    Visit Date: 8/9/2023    Physician Orders: PT Eval and Treat   Medical Diagnosis from Referral: Z98.890 (ICD-10-CM) - Postoperative state   M62.81 (ICD-10-CM) - Muscle weakness of lower extremity   Evaluation Date: 6/14/2023  Authorization Period Expiration: 12/31/2023  Plan of Care Expiration: 9/30/2023  Visit # / Visits authorized: 17/20  PTA Visit #: 1/5     Foto #1/3 on 6/14/2023, #2/3 on 6/29/2023, #3/3 on 7/12/2023 - closed   Time In: 1:04pm  Time Out: 2:22pm  Total Billable Time: 78 minutes    Subjective     HOME EXERCISE PROGRAM: reviewed, reports compliance   Response to previous treatment: continues with pain at base of great toe when WB  Function: feels like there is more mobility in her foot and she has practiced normalizing her walking pattern.    Pain: 2/10  Location: plantar surface of R foot at 1st MET     Objective    Observation/posture: patient rests in about 5 degrees of R great toe valgus     Gait/assistive device: slight impairment during toe off      Range of Motion: AROM:  Ankle Right  Left    Dorsiflexion 4 5   Plantarflexion 60 70   Inversion 36 32   Eversion 10 16   Great toe flex  -15 to 5 deg flex        Strength:  Ankle Right  Left    Dorsiflexion 4/5 4+/5   Plantarflexion 4/5 4/5   Inversion 4/5 4/5   Eversion 4/5 4+/5     Joint Mobility:   TCJ, STJ, MET, 1st met: R hypomobile, painful      Palpation/sensation:   LE light touch sensation: mild altered sensation at incision of dorsum of distal incision      Flexibility:   R Plantar fascia: restricted      Function/balance:   R SLS: tba  L SLS: tba     Edema:   R ankle figure of eight: 46.0 cm  L  "ankle figure of eight: 46.5 cm     R ankle joint line: 23.0 cm  L ankle joint line: 22.0 cm     R mets: 19.7 cm  L mets: 18.5 cm     Limitation/Restriction for FOTO Foot Survey     Therapist reviewed FOTO scores for Kirti Romero on 7/18/2023.   FOTO documents entered into EPIC - see Media section.     Limitation Score: 77     Treatment     Bold = performed   Blue = increase reps/resistance or initiated exercise     therapeutic exercises to develop strength, endurance, ROM, flexibility, posture, and core stabilization for 00 minutes:  R ankle PF/DF/EV/IV in long sitting with red band: 2x15  R marble  x 1 cup  Toe yoga, big toe up then small toes up, 3x10  Toe crunches, #2 on towel, x30  Heel raises from 4" step, eccentric emphasis at L LE: 3x10   Heel raises, B concentric RLE eccentric: on hold secondary to pain  Gastroc stretch, incline board, 30"x3  Soleus stretch, incline board, 30"x3  Plantar fascia stretch, incline board, 30"x5     manual therapy techniques: Joint mobilizations, Manual traction, Myofacial release, Soft tissue Mobilization, and Friction Massage for 20 minutes:  R Gr III/IV JM TCJ, 1st MET mobilizations with wedge   Great toe distraction and genu varus Gr III/IV JM  R STM to minimize localized swelling and address tightness   R Gr III/IV JM 1st MET     neuromuscular re-education activities to improve: Balance, Coordination, Kinesthetic, Sense, Proprioception, and Posture for 6 minutes:  Static standing in tandem stance on Airex pad, 30" hold x 3 each LE  Single leg standing, R/L, airex mat, 30"x5   Step over on BOSU ball, round side up (R leg on ball, L LE to the ground): 3x10  Static standing on BOSU ball, flat side up: 2x15 sec    therapeutic activities to improve functional performance for 37 minutes:  Upright bike, 5.0 resistance  x 5 minutes   Lunges, R/L LE forward, 2 airex mats on 6inch step, B HRA, x 20 each   Leg press, B LE squat, 5" hold each way, 2 black cords, 2x13   Leg " "press, heel lifts, B LE s, 3" hold each way, 2x10      gait training to improve functional mobility and safety for 15 minutes, including:  Fwd/bkw weight shifting, patient standing on [place] fitter board, R LE, 3x10   Dynamic heel lifts x 2 laps     Patient Education and Home Exercises       Education provided:   - Patient was educated on all therapeutic interventions performed during today's treatment visit.     Home Exercises Provided: Yes & Patient instructed to cont prior HEP. Exercises were reviewed and Kirti was able to demonstrate them prior to the end of the session.  Kirti demonstrated good  understanding of the education provided. See EMR under Patient Instructions for exercises provided during therapy sessions    Assessment   Emphasis on progression towards unilateral strengthening via leg press machine continued today. Patient is able to complete with improving tolerance and proper mechanics. However, pain at distal and plantar aspect of 1st MET of R foot persists with increased WB tasks. This is significant to toe off during walking and tolerance for prolonged walking.     Kirti is making good progress towards meeting set goals.   Pt prognosis is Good.     Pt will continue to benefit from skilled outpatient physical therapy to address the deficits listed in the problem list box on initial evaluation, provide pt/family education and to maximize pt's level of independence in the home and community environment.     Pt's spiritual, cultural and educational needs considered and pt agreeable to plan of care and goals.     Anticipated barriers to physical therapy: co morbididities     Goals:   Short Term Goals: 2 weeks   (1) patient will be I with HOME EXERCISE PROGRAM (met, 8/1/2023)      (2) patient will obtain 16 deg R ankle eversion ACTIVE RANGE OF MOTION to facilitate improved ability to negotiate uneven terrain for attending grand children's sporting events (ongoing, partially met, 8/1/2023)      (3) " patient will obtain 5 deg R ankle DF ACTIVE RANGE OF MOTION to facilitate improved ability to clear obstacles in pathway (progressing, nearly met, 8/1/2023)      Long Term Goals: 4 weeks   (1) patient will obtain R ankle eversion 4+/5 MMT to indicate improved ability to participate in vacuuming according to previous level of function (progressing, not met, 8/1/2023)      (2) patient will present with 18.8cm circumferential measure at R METs to indicate improved ability to wear 2 inch heels (progressing, not met, 8/1/2023)      (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     Outpatient Physical Therapy 2 times weekly for 2-3 weeks to include the following interventions: Cervical/Lumbar Traction, Electrical Stimulation re-eval, dry needling, Gait Training, Manual Therapy, Moist Heat/ Ice, Neuromuscular Re-ed, Patient Education, Self Care, Therapeutic Activities, and Therapeutic Exercise.      Physical therapist and physical therapy assistant(s) met face to face to discuss patient's treatment plan and progress towards established goals. Pt will be seen by a physical therapist minimally every 6th visit or every 30 days.    Luzmaria Goncalves, PT

## 2023-08-14 ENCOUNTER — CLINICAL SUPPORT (OUTPATIENT)
Dept: REHABILITATION | Facility: HOSPITAL | Age: 63
End: 2023-08-14
Payer: COMMERCIAL

## 2023-08-14 DIAGNOSIS — M20.11 HALLUX ABDUCTOVALGUS, RIGHT: Primary | ICD-10-CM

## 2023-08-14 DIAGNOSIS — M21.621 TAILOR'S BUNION OF RIGHT FOOT: ICD-10-CM

## 2023-08-14 DIAGNOSIS — R26.2 DIFFICULTY WALKING: ICD-10-CM

## 2023-08-14 DIAGNOSIS — M20.5X1 MALLET TOE, RIGHT: ICD-10-CM

## 2023-08-14 PROCEDURE — 97140 MANUAL THERAPY 1/> REGIONS: CPT

## 2023-08-14 PROCEDURE — 97110 THERAPEUTIC EXERCISES: CPT

## 2023-08-14 PROCEDURE — 97530 THERAPEUTIC ACTIVITIES: CPT

## 2023-08-14 PROCEDURE — 97116 GAIT TRAINING THERAPY: CPT

## 2023-08-14 NOTE — PROGRESS NOTES
[facility]   Physical Therapy Treatment Note      Name: Kirti Romero  Clinic Number: [id number]    Therapy Diagnosis:   Encounter Diagnoses   Name Primary?    Hallux abductovalgus, right Yes    Mallet toe, right     Tailor's bunion of right foot     Difficulty walking      Physician: Arthur Alcocer DPM    Visit Date: 8/14/2023    Physician Orders: PT Eval and Treat   Medical Diagnosis from Referral: Z98.890 (ICD-10-CM) - Postoperative state   M62.81 (ICD-10-CM) - Muscle weakness of lower extremity   Evaluation Date: 6/14/2023  Authorization Period Expiration: 12/31/2023  Plan of Care Expiration: 9/30/2023  Visit # / Visits authorized: 17/20  PTA Visit #: 1/5     Foto #1/3 on 6/14/2023, #2/3 on 6/29/2023, #3/3 on 7/12/2023 - closed   Time In: 11:05am  Time Out: 12:21pm  Total Billable Time: 78 minutes    Subjective     HOME EXERCISE PROGRAM: reviewed, reports compliance   Response to previous treatment: bottom of foot felt great after last visit for about a day and a half (no pain when walking) but symptoms returned over the weekend   Function: had significant difficulty with lifting marbles, but temporarily walked better     Pain: 2/10  Location: plantar surface of R foot at 1st MET     Objective    Observation/posture: patient rests in about 5 degrees of R great toe valgus     Gait/assistive device: slight impairment during toe off      Range of Motion: AROM:  Ankle Right  Left    Dorsiflexion 4 5   Plantarflexion 60 70   Inversion 36 32   Eversion 10 16   Great toe flex  -15 to 5 deg flex        Strength:  Ankle Right  Left    Dorsiflexion 4/5 4+/5   Plantarflexion 4/5 4/5   Inversion 4/5 4/5   Eversion 4/5 4+/5     Joint Mobility:   TCJ, STJ, MET, 1st met: R hypomobile, painful      Palpation/sensation:   LE light touch sensation: mild altered sensation at incision of dorsum of distal incision      Flexibility:   R Plantar fascia: restricted      Function/balance:   R SLS:  "tba  L SLS: tba     Edema:   R ankle figure of eight: 46.0 cm  L ankle figure of eight: 46.5 cm     R ankle joint line: 23.0 cm  L ankle joint line: 22.0 cm     R mets: 19.7 cm  L mets: 18.5 cm     Limitation/Restriction for FOTO Foot Survey     Therapist reviewed FOTO scores for Kirti Romero on 7/18/2023.   FOTO documents entered into EPIC - see Media section.     Limitation Score: 77     Treatment     Bold = performed   Blue = increase reps/resistance or initiated exercise     therapeutic exercises to develop strength, endurance, ROM, flexibility, posture, and core stabilization for 25 minutes:  R ankle PF/DF/EV/IV in long sitting with red band: 2x15  R marble  x 1 cup  Toe yoga, big toe up then small toes up, 3x10  Toe crunches, #2 on towel, x30  Heel raises from 4" step, eccentric emphasis at L LE: 3x10   Heel raises, B concentric RLE eccentric: on hold secondary to pain  Gastroc stretch, incline board, 30"x3  Soleus stretch, incline board, 30"x3  Plantar fascia stretch, incline board, 30"x5  +Dynamic drinking birds, x 1 lap   +Review of HOME EXERCISE PROGRAM      manual therapy techniques: Joint mobilizations, Manual traction, Myofacial release, Soft tissue Mobilization, and Friction Massage for 23 minutes:  R Gr III/IV JM TCJ, 1st MET mobilizations with wedge   Great toe distraction and genu varus Gr III/IV JM  R STM to minimize localized swelling and address tightness   R Gr III/IV JM 1st MET     neuromuscular re-education activities to improve: Balance, Coordination, Kinesthetic, Sense, Proprioception, and Posture for 00 minutes:  Static standing in tandem stance on Airex pad, 30" hold x 3 each LE  Single leg standing, R/L, airex mat, 30"x5   Step over on BOSU ball, round side up (R leg on ball, L LE to the ground): 3x10  Static standing on BOSU ball, flat side up: 2x15 sec    therapeutic activities to improve functional performance for 15 minutes:  Upright bike, 5.0 resistance  x 5 minutes " "  Lunges, R/L LE forward, 2 airex mats on 6inch step, B HRA, x 20 each   +Leg press, R/L LE squat, 5" hold each way, 1 black cord & 1 red cord, 2x20  +Leg press, heel lifts, B LE s with R LE lowering, 3" hold each way, 3x10      gait training to improve functional mobility and safety for 15 minutes, including:  Fwd/bkw weight shifting, patient standing on [place] fitter board, R LE,  B HRA, 3x10   Dynamic heel lifts x 2 laps     Patient Education and Home Exercises       Education provided:   - Patient was educated on all therapeutic interventions performed during today's treatment visit.     Home Exercises Provided: Yes & Patient instructed to cont prior HEP. Exercises were reviewed and Kirti was able to demonstrate them prior to the end of the session.  Kirti demonstrated good  understanding of the education provided. See EMR under Patient Instructions for exercises provided during therapy sessions    Assessment   Emphasis on progression towards unilateral strengthening via L LE lowering while on leg press machine today. Patient is able to complete with improving tolerance and gradually improving quads strength. However, pain at distal and plantar aspect of 1st ray of R foot  occurs with increased WB tasks. This is significant to toe off during walking and tolerance for prolonged walking.     Kirti is making good progress towards meeting set goals.   Pt prognosis is Good.     Pt will continue to benefit from skilled outpatient physical therapy to address the deficits listed in the problem list box on initial evaluation, provide pt/family education and to maximize pt's level of independence in the home and community environment.     Pt's spiritual, cultural and educational needs considered and pt agreeable to plan of care and goals.     Anticipated barriers to physical therapy: co morbididities     Goals:   Short Term Goals: 2 weeks   (1) patient will be I with HOME EXERCISE PROGRAM (met, 8/1/2023)      (2) patient " will obtain 16 deg R ankle eversion ACTIVE RANGE OF MOTION to facilitate improved ability to negotiate uneven terrain for attending grand children's sporting events (ongoing, partially met, 8/1/2023)      (3) patient will obtain 5 deg R ankle DF ACTIVE RANGE OF MOTION to facilitate improved ability to clear obstacles in pathway (progressing, nearly met, 8/1/2023)      Long Term Goals: 4 weeks   (1) patient will obtain R ankle eversion 4+/5 MMT to indicate improved ability to participate in vacuuming according to previous level of function (progressing, not met, 8/1/2023)      (2) patient will present with 18.8cm circumferential measure at R METs to indicate improved ability to wear 2 inch heels (progressing, not met, 8/1/2023)      (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     Outpatient Physical Therapy 2 times weekly for 2-3 weeks to include the following interventions: Cervical/Lumbar Traction, Electrical Stimulation re-eval, dry needling, Gait Training, Manual Therapy, Moist Heat/ Ice, Neuromuscular Re-ed, Patient Education, Self Care, Therapeutic Activities, and Therapeutic Exercise.      Physical therapist and physical therapy assistant(s) met face to face to discuss patient's treatment plan and progress towards established goals. Pt will be seen by a physical therapist minimally every 6th visit or every 30 days.    Luzmaria Goncalves, PT

## 2023-08-16 ENCOUNTER — OFFICE VISIT (OUTPATIENT)
Dept: OPTOMETRY | Facility: CLINIC | Age: 63
End: 2023-08-16
Payer: COMMERCIAL

## 2023-08-16 ENCOUNTER — PATIENT MESSAGE (OUTPATIENT)
Dept: INTERNAL MEDICINE | Facility: CLINIC | Age: 63
End: 2023-08-16
Payer: COMMERCIAL

## 2023-08-16 DIAGNOSIS — H43.822 VITREOMACULAR ADHESION OF LEFT EYE: ICD-10-CM

## 2023-08-16 DIAGNOSIS — E53.8 B12 DEFICIENCY: ICD-10-CM

## 2023-08-16 DIAGNOSIS — G47.00 INSOMNIA, UNSPECIFIED TYPE: ICD-10-CM

## 2023-08-16 DIAGNOSIS — E55.9 VITAMIN D DEFICIENCY: ICD-10-CM

## 2023-08-16 DIAGNOSIS — Z00.00 ROUTINE PHYSICAL EXAMINATION: Primary | ICD-10-CM

## 2023-08-16 DIAGNOSIS — E03.9 HYPOTHYROIDISM, UNSPECIFIED TYPE: ICD-10-CM

## 2023-08-16 DIAGNOSIS — H52.13 MYOPIA OF BOTH EYES WITH ASTIGMATISM: ICD-10-CM

## 2023-08-16 DIAGNOSIS — H52.4 PRESBYOPIA OF BOTH EYES: ICD-10-CM

## 2023-08-16 DIAGNOSIS — R73.09 ELEVATED GLUCOSE LEVEL: ICD-10-CM

## 2023-08-16 DIAGNOSIS — H25.13 NUCLEAR SCLEROSIS OF BOTH EYES: Primary | ICD-10-CM

## 2023-08-16 DIAGNOSIS — Z13.5 SCREENING FOR EYE CONDITION: ICD-10-CM

## 2023-08-16 DIAGNOSIS — H52.203 MYOPIA OF BOTH EYES WITH ASTIGMATISM: ICD-10-CM

## 2023-08-16 PROCEDURE — 99999 PR PBB SHADOW E&M-EST. PATIENT-LVL III: ICD-10-PCS | Mod: PBBFAC,,, | Performed by: OPTOMETRIST

## 2023-08-16 PROCEDURE — 92014 PR EYE EXAM, EST PATIENT,COMPREHESV: ICD-10-PCS | Mod: S$GLB,,, | Performed by: OPTOMETRIST

## 2023-08-16 PROCEDURE — 99999 PR PBB SHADOW E&M-EST. PATIENT-LVL III: CPT | Mod: PBBFAC,,, | Performed by: OPTOMETRIST

## 2023-08-16 PROCEDURE — 92014 COMPRE OPH EXAM EST PT 1/>: CPT | Mod: S$GLB,,, | Performed by: OPTOMETRIST

## 2023-08-16 NOTE — PATIENT INSTRUCTIONS
Trace nuclear sclerosis of lens of both eyes, consistent with age.  Small peripheral cortical cataract.    Slight asymmetry in optic nerve head cupping, greater in the right eye.  Noted previously.  Cup-to-disc ratio appears stable in each eye, and intraocular pressure remains within normal range in each eye.  Continue to monitor only.       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 Alex, retina specialist).  No evidence of secondary retinal tear/hole/break.    OCT of retina at macula done today for use as baseline.      Good ocular health in both eyes, otherwise.    Myopia with astigmatism in each eye.  Good best correctable VA in each eye.  Presbyopia consistent with age.  New spectacle lens Rx issued for use as desired.  Recommend full-time wear.      Recheck in one year - or prior, if any problems in the interim.

## 2023-08-16 NOTE — PROGRESS NOTES
HPI     eye exam            Comments: General eye exam and refraction.  Wears glasses full-time.  Notes need to left glasses to see better at near.  Distance VA with glasses okay             Comments    Patient's age: 62 y.o. WF   Last saw Dr. Garcia 11/23/2021  Wears glasses? yes      If yes, wears  Full-time or part-time?  Full-time   Present glasses are: Bifocal, SV Distance, SV Reading?  Progressive lenses     Approximate age of present glasses:  2 years old  Got new glasses following last exam, or subsequently?: Yes    Any problem with VA with glasses? Reading is off. Pt has to lift her   glasses up to read.   Wears CLs?:  no   Headaches?  no   Eye pain/discomfort?  no                                                                                     Flashes?  no   Floaters?  yes, in OS looks like a spider web.   Diplopia/Double vision?  no   Patient's Ocular History:          Any eye surgeries? No          Any eye injury?  No          Any treatment for eye disease?  No   Family history of eye disease?  No   Significant patient medical history:         1. Diabetes?  no      If yes, IDDM or NIDDM? n/a   2. HBP?  no               3. Other (describe):  None   ! OTC eyedrops currently using:  No    ! Prescription eye meds currently using:  no    ! Any history of allergy/adverse reaction to any eye meds used   previously?  no    ! Any history of allergy/adverse reaction to eyedrops used during prior   eye exam(s)? no    ! Any history of allergy/adverse reaction to Novacaine or similar meds?   no    ! Any history of allergy/adverse reaction to Epinephrine or similar meds?   no    ! Patient okay with use of anesthetic eyedrops to check eye pressure?    yes        ! Patient okay with use of eyedrops to dilate pupils today?  Yes, but   requests only one drop (of Tropicamide) in each eye, and not Phenylephrine      !  Allergies/Medications/Medical History/Family History reviewed today?    yes       PD =   61/57  "  Desired reading distance =  12" (use 13")                           Last edited by Santo Garcia, OD on 8/16/2023 10:13 AM.            Assessment /Plan     For exam results, see Encounter Report.    1. Nuclear sclerosis of both eyes        2. Vitreomacular adhesion of left eye        3. Myopia of both eyes with astigmatism        4. Presbyopia of both eyes        5. Screening for eye condition                            Trace nuclear sclerosis of lens of both eyes, consistent with age.  Small peripheral cortical cataract.    Slight asymmetry in optic nerve head cupping, greater in the right eye.  Noted previously.  Cup-to-disc ratio appears stable in each eye, and intraocular pressure remains within normal range in each eye.  Continue to monitor only.       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 Alex, retina specialist).  No evidence of secondary retinal tear/hole/break.    OCT of retina at macula done today for use as baseline.      Good ocular health in both eyes, otherwise.    Myopia with astigmatism in each eye.  Good best correctable VA in each eye.  Presbyopia consistent with age.  New spectacle lens Rx issued for use as desired.  Recommend full-time wear.      Recheck in one year - or prior, if any problems in the interim.             "

## 2023-08-17 ENCOUNTER — CLINICAL SUPPORT (OUTPATIENT)
Dept: REHABILITATION | Facility: HOSPITAL | Age: 63
End: 2023-08-17
Payer: COMMERCIAL

## 2023-08-17 DIAGNOSIS — M20.11 HALLUX ABDUCTOVALGUS, RIGHT: Primary | ICD-10-CM

## 2023-08-17 DIAGNOSIS — M21.621 TAILOR'S BUNION OF RIGHT FOOT: ICD-10-CM

## 2023-08-17 DIAGNOSIS — M20.5X1 MALLET TOE, RIGHT: ICD-10-CM

## 2023-08-17 DIAGNOSIS — R26.2 DIFFICULTY WALKING: ICD-10-CM

## 2023-08-17 PROCEDURE — 97110 THERAPEUTIC EXERCISES: CPT

## 2023-08-17 PROCEDURE — 97530 THERAPEUTIC ACTIVITIES: CPT

## 2023-08-17 PROCEDURE — 97140 MANUAL THERAPY 1/> REGIONS: CPT

## 2023-08-17 PROCEDURE — 97116 GAIT TRAINING THERAPY: CPT

## 2023-08-17 NOTE — PROGRESS NOTES
[facility]   Physical Therapy Treatment Note      Name: Kirti Romero  Clinic Number: [id number]    Therapy Diagnosis:   Encounter Diagnoses   Name Primary?    Hallux abductovalgus, right Yes    Mallet toe, right     Tailor's bunion of right foot     Difficulty walking      Physician: Arthur Alcocer DPM    Visit Date: 8/17/2023    Physician Orders: PT Eval and Treat   Medical Diagnosis from Referral: Z98.890 (ICD-10-CM) - Postoperative state   M62.81 (ICD-10-CM) - Muscle weakness of lower extremity   Evaluation Date: 6/14/2023  Authorization Period Expiration: 12/31/2023  Plan of Care Expiration: 9/30/2023  Visit # / Visits authorized: 18/20  PTA Visit #: 1/5     Foto #1/3 on 6/14/2023, #2/3 on 6/29/2023, #3/3 on 7/12/2023 - closed   Time In: 11:00am  Time Out: 12:30pm  Total Billable Time: 90 minutes    Subjective     HOME EXERCISE PROGRAM: reviewed, reports compliance   Response to previous treatment: plantar surface at R great toe is feeling less pain between visits   Function: walking is improving     Pain: 2/10  Location: plantar surface of R foot at 1st MET     Objective    Observation/posture: patient rests in about 5 degrees of R great toe valgus     Gait/assistive device: slight impairment during toe off      Range of Motion: AROM:  Ankle Right  Left    Dorsiflexion 4 5   Plantarflexion 60 70   Inversion 36 32   Eversion 10 16   Great toe flex  -15 to 5 deg flex        Strength:  Ankle Right  Left    Dorsiflexion 4/5 4+/5   Plantarflexion 4/5 4/5   Inversion 4/5 4/5   Eversion 4/5 4+/5     Joint Mobility:   TCJ, STJ, MET, 1st met: R hypomobile, painful      Palpation/sensation:   LE light touch sensation: mild altered sensation at incision of dorsum of distal incision      Flexibility:   R Plantar fascia: restricted      Function/balance:   R SLS: tba  L SLS: tba     Edema:   R ankle figure of eight: 46.0 cm  L ankle figure of eight: 46.5 cm     R ankle joint line: 23.0  "cm  L ankle joint line: 22.0 cm     R mets: 19.7 cm  L mets: 18.5 cm     Limitation/Restriction for FOTO Foot Survey     Therapist reviewed FOTO scores for Kirti Romero on 7/18/2023.   FOTO documents entered into EPIC - see Media section.     Limitation Score: 77     Treatment     Bold = performed   Blue = increase reps/resistance  + = initiated exercise     therapeutic exercises to develop strength, endurance, ROM, flexibility, posture, and core stabilization for 15 minutes:  R ankle PF/DF/EV/IV in long sitting with red band: 2x15  R marble  x 1 cup  Toe yoga, big toe up then small toes up, 3x10  Toe crunches, #2 on towel, x30  Heel raises from 4" step, eccentric emphasis at L LE: 3x10   Heel raises, B concentric RLE eccentric: on hold secondary to pain  Gastroc stretch, incline board, 30"x3  Soleus stretch, incline board, 30"x3  Plantar fascia stretch, incline board, 30"x5  Dynamic drinking birds, x 2 laps     manual therapy techniques: Joint mobilizations, Manual traction, Myofacial release, Soft tissue Mobilization, and Friction Massage for 30 minutes:  R Gr III/IV JM TCJ, 1st MET mobilizations with wedge   Great toe distraction and genu varus Gr III/IV JM  R STM to minimize localized swelling and address tightness   R Gr III/IV JM 1st MET     neuromuscular re-education activities to improve: Balance, Coordination, Kinesthetic, Sense, Proprioception, and Posture for 00 minutes:  Static standing in tandem stance on Airex pad, 30" hold x 3 each LE  Single leg standing, R/L, airex mat, 30"x5   Step over on BOSU ball, round side up (R leg on ball, L LE to the ground): 3x10  Static standing on BOSU ball, flat side up: 2x15 sec    therapeutic activities to improve functional performance for 30 minutes:  Upright bike, 5.5 resistance  x 5 minutes   Lunges, R/L LE forward, 2 airex mats on 6inch step, B HRA, x 20 each   Leg press, R/L LE squat, 3" hold each way, 1 black cord & 1 red cord, 1x20 each  Leg " "press, heel lifts, B LE s with R LE lowering, 3" hold each way, 3x10   +Step down, L LE leading, 6inch step, B HRA, 3x10     gait training to improve functional mobility and safety for 15 minutes, including:  Fwd/bkw weight shifting, patient standing on [place] fitter board, R LE,  B HRA, 1' three times   Dynamic heel lifts x 2 laps     Patient Education and Home Exercises       Education provided:   - Patient was educated on all therapeutic interventions performed during today's treatment visit.     Home Exercises Provided: Yes & Patient instructed to cont prior HEP. Exercises were reviewed and Kirti was able to demonstrate them prior to the end of the session.  Kirti demonstrated good  understanding of the education provided. See EMR under Patient Instructions for exercises provided during therapy sessions    Assessment   Emphasis on progression towards unilateral strengthening via L LE lowering while on leg press machine. Patient is able to complete with improving tolerance and gradually improving quads strength. Additionally, there is less pain at distal and plantar aspect of 1st ray of R foot occurring with WB tasks today. This is significant to toe off during walking and tolerance for prolonged walking. Patient allowed a rest day from exercises the day following her last PHYSICAL THERAPY session, per PHYSICAL THERAPY instruction, and this appears to be helpful in overall recovery response.     Kirti is making good progress towards meeting set goals.   Pt prognosis is Good.     Pt will continue to benefit from skilled outpatient physical therapy to address the deficits listed in the problem list box on initial evaluation, provide pt/family education and to maximize pt's level of independence in the home and community environment.     Pt's spiritual, cultural and educational needs considered and pt agreeable to plan of care and goals.     Anticipated barriers to physical therapy: co morbididities     Goals:   Short " Term Goals: 2 weeks   (1) patient will be I with HOME EXERCISE PROGRAM (met, 8/1/2023)      (2) patient will obtain 16 deg R ankle eversion ACTIVE RANGE OF MOTION to facilitate improved ability to negotiate uneven terrain for attending grand children's sporting events (ongoing, partially met, 8/1/2023)      (3) patient will obtain 5 deg R ankle DF ACTIVE RANGE OF MOTION to facilitate improved ability to clear obstacles in pathway (progressing, nearly met, 8/1/2023)      Long Term Goals: 4 weeks   (1) patient will obtain R ankle eversion 4+/5 MMT to indicate improved ability to participate in vacuuming according to previous level of function (progressing, not met, 8/1/2023)      (2) patient will present with 18.8cm circumferential measure at R METs to indicate improved ability to wear 2 inch heels (progressing, not met, 8/1/2023)      (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     Outpatient Physical Therapy 2 times weekly for 2-3 weeks to include the following interventions: Cervical/Lumbar Traction, Electrical Stimulation re-eval, dry needling, Gait Training, Manual Therapy, Moist Heat/ Ice, Neuromuscular Re-ed, Patient Education, Self Care, Therapeutic Activities, and Therapeutic Exercise.      Physical therapist and physical therapy assistant(s) met face to face to discuss patient's treatment plan and progress towards established goals. Pt will be seen by a physical therapist minimally every 6th visit or every 30 days.    Luzmaria Goncalves, PT

## 2023-08-22 ENCOUNTER — CLINICAL SUPPORT (OUTPATIENT)
Dept: REHABILITATION | Facility: HOSPITAL | Age: 63
End: 2023-08-22
Payer: COMMERCIAL

## 2023-08-22 DIAGNOSIS — M20.11 HALLUX ABDUCTOVALGUS, RIGHT: Primary | ICD-10-CM

## 2023-08-22 DIAGNOSIS — M20.5X1 MALLET TOE, RIGHT: ICD-10-CM

## 2023-08-22 DIAGNOSIS — R26.2 DIFFICULTY WALKING: ICD-10-CM

## 2023-08-22 DIAGNOSIS — M21.621 TAILOR'S BUNION OF RIGHT FOOT: ICD-10-CM

## 2023-08-22 PROCEDURE — 97116 GAIT TRAINING THERAPY: CPT

## 2023-08-22 PROCEDURE — 97140 MANUAL THERAPY 1/> REGIONS: CPT

## 2023-08-22 PROCEDURE — 97110 THERAPEUTIC EXERCISES: CPT

## 2023-08-22 PROCEDURE — 97530 THERAPEUTIC ACTIVITIES: CPT

## 2023-08-22 NOTE — PROGRESS NOTES
[facility]   Physical Therapy Treatment Note      Name: Kirti Romero  Clinic Number: [id number]    Therapy Diagnosis:   Encounter Diagnoses   Name Primary?    Hallux abductovalgus, right Yes    Mallet toe, right     Tailor's bunion of right foot     Difficulty walking      Physician: Arthur Alcocer DPM    Visit Date: 8/22/2023    Physician Orders: PT Eval and Treat   Medical Diagnosis from Referral: Z98.890 (ICD-10-CM) - Postoperative state   M62.81 (ICD-10-CM) - Muscle weakness of lower extremity   Evaluation Date: 6/14/2023  Authorization Period Expiration: 12/31/2023  Plan of Care Expiration: 9/30/2023  Visit # / Visits authorized: 18/20  PTA Visit #: 1/5     Foto #1/3 on 6/14/2023, #2/3 on 6/29/2023, #3/3 on 7/12/2023 - closed   Time In: 11:00am  Time Out: 12:30pm  Total Billable Time: 90 minutes    Subjective     HOME EXERCISE PROGRAM: reviewed, compliant   Response to previous treatment: plantar surface at R great toe is feeling less pain between visits   Function: walking is improving     Pain: 2/10  Location: plantar surface of R foot at 1st MET     Objective    Observation/posture: patient rests in about 5 degrees of R great toe valgus     Gait/assistive device: slight impairment during toe off      Range of Motion: AROM:  Ankle Right  Left    Dorsiflexion 4 5   Plantarflexion 60 70   Inversion 36 32   Eversion 10 16   Great toe flex  -15 to 5 deg flex        Strength:  Ankle Right  Left    Dorsiflexion 4/5 4+/5   Plantarflexion 4/5 4/5   Inversion 4/5 4/5   Eversion 4/5 4+/5     Joint Mobility:   TCJ, STJ, MET, 1st met: R hypomobile, painful      Palpation/sensation:   LE light touch sensation: mild altered sensation at incision of dorsum of distal incision      Flexibility:   R Plantar fascia: restricted      Function/balance:   R SLS: tba  L SLS: tba     Edema:   R ankle figure of eight: 46.0 cm  L ankle figure of eight: 46.5 cm     R ankle joint line: 23.0 cm  L  "ankle joint line: 22.0 cm     R mets: 19.7 cm  L mets: 18.5 cm     Limitation/Restriction for FOTO Foot Survey     Therapist reviewed FOTO scores   FOTO documents entered into EPIC - see Media section.     Limitation Score: 77     Treatment     Bold = performed   Blue = increase reps/resistance  + = initiated exercise     therapeutic exercises to develop strength, endurance, ROM, flexibility, posture, and core stabilization for 15 minutes:  R ankle PF/DF/EV/IV in long sitting with red band: 2x15  R marble  x 1 cup  Toe yoga, big toe up then small toes up, 3x10  Toe crunches, #2 on towel, x30  Heel raises from 4" step, eccentric emphasis at L LE: 3x10   Heel raises, B concentric RLE eccentric: on hold secondary to pain  Gastroc stretch, incline board, 30"x3  Soleus stretch, incline board, 30"x3  Plantar fascia stretch, incline board, 30"x5  Dynamic drinking birds, x 4 laps     manual therapy techniques: Joint mobilizations, Manual traction, Myofacial release, Soft tissue Mobilization, and Friction Massage for 30 minutes:  R Gr III/IV JM TCJ, 1st MET mobilizations with wedge   Great toe distraction and genu varus Gr III/IV JM  R STM to minimize localized swelling and address tightness   R Gr III/IV JM 1st MET     neuromuscular re-education activities to improve: Balance, Coordination, Kinesthetic, Sense, Proprioception, and Posture for 00 minutes:  Static standing in tandem stance on Airex pad, 30" hold x 3 each LE  Single leg standing, R/L, airex mat, 30"x5   Step over on BOSU ball, round side up (R leg on ball, L LE to the ground): 3x10  Static standing on BOSU ball, flat side up: 2x15 sec    therapeutic activities to improve functional performance for 30 minutes:  Upright bike, 5.5 resistance  x 5 minutes   Lunges, R/L LE forward, unilateral HRA, x 20 each   Leg press, R/L LE squat, 3" hold each way, 1 black cord & 1 red cord, 2x13 each  Leg press, heel lifts, B LE s with R LE lowering, 3" hold each way, " 2x13  Step down, L LE leading, 6inch step, B HRA, 3x10     gait training to improve functional mobility and safety for 15 minutes, including:  Fwd/bkw weight shifting, patient standing on [place] fitter board, R LE,  B HRA, 1' three times   Dynamic heel lifts x 2 laps     Patient Education and Home Exercises       Education provided:   - Patient was educated on all therapeutic interventions performed during today's treatment visit.     Home Exercises Provided: Yes & Patient instructed to cont prior HEP. Exercises were reviewed and Kirti was able to demonstrate them prior to the end of the session.  Kirti demonstrated good  understanding of the education provided. See EMR under Patient Instructions for exercises provided during therapy sessions    Assessment   Emphasis on progression towards unilateral strengthening via L LE lowering while on leg press machine. Patient is able to complete with improving tolerance and gradually improving quads strength. Additionally, there is less pain at distal and plantar aspect of 1st ray of R foot occurring with WB tasks. This is significant to toe off during walking and tolerance for prolonged walking.     Kirti is making good progress towards meeting set goals.   Pt prognosis is Good.     Pt will continue to benefit from skilled outpatient physical therapy to address the deficits listed in the problem list box on initial evaluation, provide pt/family education and to maximize pt's level of independence in the home and community environment.     Pt's spiritual, cultural and educational needs considered and pt agreeable to plan of care and goals.     Anticipated barriers to physical therapy: co morbididities     Goals:   Short Term Goals: 2 weeks   (1) patient will be I with HOME EXERCISE PROGRAM (met, 8/1/2023)      (2) patient will obtain 16 deg R ankle eversion ACTIVE RANGE OF MOTION to facilitate improved ability to negotiate uneven terrain for attending grand children's sporting  events (ongoing, partially met, 8/1/2023)      (3) patient will obtain 5 deg R ankle DF ACTIVE RANGE OF MOTION to facilitate improved ability to clear obstacles in pathway (progressing, nearly met, 8/1/2023)      Long Term Goals: 4 weeks   (1) patient will obtain R ankle eversion 4+/5 MMT to indicate improved ability to participate in vacuuming according to previous level of function (progressing, not met, 8/1/2023)      (2) patient will present with 18.8cm circumferential measure at R METs to indicate improved ability to wear 2 inch heels (progressing, not met, 8/1/2023)      (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     Outpatient Physical Therapy 2 times weekly for 2-3 weeks to include the following interventions: Cervical/Lumbar Traction, Electrical Stimulation re-eval, dry needling, Gait Training, Manual Therapy, Moist Heat/ Ice, Neuromuscular Re-ed, Patient Education, Self Care, Therapeutic Activities, and Therapeutic Exercise.      Physical therapist and physical therapy assistant(s) met face to face to discuss patient's treatment plan and progress towards established goals. Pt will be seen by a physical therapist minimally every 6th visit or every 30 days.    Luzmaria Goncalves, PT

## 2023-08-23 LAB
25(OH)D3 SERPL-MCNC: 71 NG/ML (ref 30–100)
ALBUMIN SERPL-MCNC: 4.6 G/DL (ref 3.6–5.1)
ALBUMIN/GLOB SERPL: 2.4 (CALC) (ref 1–2.5)
ALP SERPL-CCNC: 67 U/L (ref 37–153)
ALT SERPL-CCNC: 12 U/L (ref 6–29)
AST SERPL-CCNC: 19 U/L (ref 10–35)
BASOPHILS # BLD AUTO: 52 CELLS/UL (ref 0–200)
BASOPHILS NFR BLD AUTO: 1.1 %
BILIRUB SERPL-MCNC: 0.6 MG/DL (ref 0.2–1.2)
BUN SERPL-MCNC: 14 MG/DL (ref 7–25)
BUN/CREAT SERPL: NORMAL (CALC) (ref 6–22)
CALCIUM SERPL-MCNC: 9.5 MG/DL (ref 8.6–10.4)
CHLORIDE SERPL-SCNC: 105 MMOL/L (ref 98–110)
CHOLEST SERPL-MCNC: 217 MG/DL
CHOLEST/HDLC SERPL: 2.4 (CALC)
CO2 SERPL-SCNC: 30 MMOL/L (ref 20–32)
CREAT SERPL-MCNC: 0.77 MG/DL (ref 0.5–1.05)
EGFR: 87 ML/MIN/1.73M2
EOSINOPHIL # BLD AUTO: 118 CELLS/UL (ref 15–500)
EOSINOPHIL NFR BLD AUTO: 2.5 %
ERYTHROCYTE [DISTWIDTH] IN BLOOD BY AUTOMATED COUNT: 12.6 % (ref 11–15)
GLOBULIN SER CALC-MCNC: 1.9 G/DL (CALC) (ref 1.9–3.7)
GLUCOSE SERPL-MCNC: 77 MG/DL (ref 65–99)
HBA1C MFR BLD: 5.1 % OF TOTAL HGB
HCT VFR BLD AUTO: 39.8 % (ref 35–45)
HDLC SERPL-MCNC: 90 MG/DL
HGB BLD-MCNC: 13.3 G/DL (ref 11.7–15.5)
LDLC SERPL CALC-MCNC: 108 MG/DL (CALC)
LYMPHOCYTES # BLD AUTO: 2110 CELLS/UL (ref 850–3900)
LYMPHOCYTES NFR BLD AUTO: 44.9 %
MCH RBC QN AUTO: 31.8 PG (ref 27–33)
MCHC RBC AUTO-ENTMCNC: 33.4 G/DL (ref 32–36)
MCV RBC AUTO: 95.2 FL (ref 80–100)
MONOCYTES # BLD AUTO: 465 CELLS/UL (ref 200–950)
MONOCYTES NFR BLD AUTO: 9.9 %
NEUTROPHILS # BLD AUTO: 1955 CELLS/UL (ref 1500–7800)
NEUTROPHILS NFR BLD AUTO: 41.6 %
NONHDLC SERPL-MCNC: 127 MG/DL (CALC)
PLATELET # BLD AUTO: 230 THOUSAND/UL (ref 140–400)
PMV BLD REES-ECKER: 11.3 FL (ref 7.5–12.5)
POTASSIUM SERPL-SCNC: 3.9 MMOL/L (ref 3.5–5.3)
PROT SERPL-MCNC: 6.5 G/DL (ref 6.1–8.1)
RBC # BLD AUTO: 4.18 MILLION/UL (ref 3.8–5.1)
SODIUM SERPL-SCNC: 143 MMOL/L (ref 135–146)
TRIGL SERPL-MCNC: 95 MG/DL
TSH SERPL-ACNC: 2.44 MIU/L (ref 0.4–4.5)
VIT B12 SERPL-MCNC: 469 PG/ML (ref 200–1100)
WBC # BLD AUTO: 4.7 THOUSAND/UL (ref 3.8–10.8)

## 2023-08-24 ENCOUNTER — CLINICAL SUPPORT (OUTPATIENT)
Dept: REHABILITATION | Facility: HOSPITAL | Age: 63
End: 2023-08-24
Payer: COMMERCIAL

## 2023-08-24 DIAGNOSIS — M21.621 TAILOR'S BUNION OF RIGHT FOOT: ICD-10-CM

## 2023-08-24 DIAGNOSIS — M20.11 HALLUX ABDUCTOVALGUS, RIGHT: Primary | ICD-10-CM

## 2023-08-24 DIAGNOSIS — M20.5X1 MALLET TOE, RIGHT: ICD-10-CM

## 2023-08-24 DIAGNOSIS — R26.2 DIFFICULTY WALKING: ICD-10-CM

## 2023-08-24 PROCEDURE — 97110 THERAPEUTIC EXERCISES: CPT

## 2023-08-24 PROCEDURE — 97140 MANUAL THERAPY 1/> REGIONS: CPT

## 2023-08-24 PROCEDURE — 97530 THERAPEUTIC ACTIVITIES: CPT

## 2023-08-24 NOTE — PROGRESS NOTES
[facility]   Physical Therapy Treatment Note      Name: Kirti Romero  Clinic Number: [id number]    Therapy Diagnosis:   Encounter Diagnoses   Name Primary?    Hallux abductovalgus, right Yes    Mallet toe, right     Tailor's bunion of right foot     Difficulty walking      Physician: Arthur Alcocer DPM    Visit Date: 8/24/2023    Physician Orders: PT Eval and Treat   Medical Diagnosis from Referral: Z98.890 (ICD-10-CM) - Postoperative state   M62.81 (ICD-10-CM) - Muscle weakness of lower extremity   Evaluation Date: 6/14/2023  Authorization Period Expiration: 12/31/2023  Plan of Care Expiration: 9/30/2023  Visit # / Visits authorized: 19/20  PTA Visit #: 1/5     Foto #1/3 on 6/14/2023, #2/3 on 6/29/2023, #3/3 on 7/12/2023 - closed   Time In: 9:00am  Time Out: 10:16am  Total Billable Time: 76 minutes    Subjective     HOME EXERCISE PROGRAM: reviewed, compliant   Response to previous treatment: R lateral 3 digits ran into something between PHYSICAL THERAPY visits so there is more pain present today. Increased walking yesterday and notes increased symptoms at bottom of R foot/toes   Function: walking is improving and rest days are helping     Pain: 2/10  Location: plantar surface of R foot at 1st MET     Objective    Observation/posture: patient rests in about 5 degrees of R great toe valgus     Gait/assistive device: slight impairment during toe off      Range of Motion: AROM:  Ankle Right  Left    Dorsiflexion 4 5   Plantarflexion 60 70   Inversion 36 32   Eversion 10 16   Great toe flex  -15 to 5 deg flex        Strength:  Ankle Right  Left    Dorsiflexion 4/5 4+/5   Plantarflexion 4/5 4/5   Inversion 4/5 4/5   Eversion 4/5 4+/5     Joint Mobility:   TCJ, STJ, MET, 1st met: R hypomobile, painful      Palpation/sensation:   LE light touch sensation: mild altered sensation at incision of dorsum of distal incision      Flexibility:   R Plantar fascia: restricted     "  Function/balance:   R SLS: tba  L SLS: tba     Edema:   R ankle figure of eight: 46.0 cm  L ankle figure of eight: 46.5 cm     R ankle joint line: 23.0 cm  L ankle joint line: 22.0 cm     R mets: 19.7 cm  L mets: 18.5 cm     Limitation/Restriction for FOTO Foot Survey     Therapist reviewed FOTO scores   FOTO documents entered into EPIC - see Media section.     Limitation Score: 77     Treatment     Bold = performed   Blue = increase reps/resistance  + = initiated exercise     therapeutic exercises to develop strength, endurance, ROM, flexibility, posture, and core stabilization for 12 minutes:  R ankle PF/DF/EV/IV in long sitting with red band: 2x15  R marble  x 1 cup  Toe yoga, big toe up then small toes up, 3x10  Toe crunches, #2 on towel, x30  Heel raises from 4" step, eccentric emphasis at L LE: 3x10   Heel raises, B concentric RLE eccentric: on hold secondary to pain  Gastroc stretch, incline board, 30"x3  Soleus stretch, incline board, 30"x3  Plantar fascia stretch, incline board, 30"x5  Dynamic drinking birds, x 4 laps     manual therapy techniques: Joint mobilizations, Manual traction, Myofacial release, Soft tissue Mobilization, and Friction Massage for 20 minutes:  R Gr III/IV JM TCJ, 1st MET mobilizations with wedge   Great toe distraction and genu varus Gr III/IV JM  R STM to minimize localized swelling and address tightness   R Gr III/IV JM 1st MET     neuromuscular re-education activities to improve: Balance, Coordination, Kinesthetic, Sense, Proprioception, and Posture for 6 minutes:  Static standing in tandem stance on Airex pad, 30" hold x 3 each LE  Single leg standing, R/L, airex mat, 30"x5   Step over on BOSU ball, round side up (R leg on ball, L LE to the ground): 3x10  Static standing on BOSU ball, flat side up: 2x15 sec    therapeutic activities to improve functional performance for 25 minutes:  Upright bike, 5.5 resistance  x 5 minutes   Lunges, R/L LE forward, unilateral HRA, x 20 " "each   Leg press, R/L LE squat, 3" hold each way, 1 black cord & 1 red cord, 2x15 each  Leg press, heel lifts, B LE s with R LE lowering, 3" hold each way, 2x15  Step down, L LE leading, 6inch step, B HRA, 15" x 8     gait training to improve functional mobility and safety for 13 minutes, including:  Fwd/bkw weight shifting, patient standing on [place] fitter board, R LE,  B HRA, 1' three times   Dynamic heel lifts x 2 laps     Patient Education and Home Exercises       Education provided:   - Patient was educated on all therapeutic interventions performed during today's treatment visit.     Home Exercises Provided: Yes & Patient instructed to cont prior HEP. Exercises were reviewed and Kirti was able to demonstrate them prior to the end of the session.  Kirti demonstrated good  understanding of the education provided. See EMR under Patient Instructions for exercises provided during therapy sessions    Assessment   Emphasis on extension stretching for R 1st toe today via manual JM and stretches during step downs. Patient completes with appropriate stretch to targeted structures.     Kirti is making good progress towards meeting set goals.   Pt prognosis is Good.     Pt will continue to benefit from skilled outpatient physical therapy to address the deficits listed in the problem list box on initial evaluation, provide pt/family education and to maximize pt's level of independence in the home and community environment.     Pt's spiritual, cultural and educational needs considered and pt agreeable to plan of care and goals.     Anticipated barriers to physical therapy: co morbididities     Goals:   Short Term Goals: 2 weeks   (1) patient will be I with HOME EXERCISE PROGRAM (met, 8/1/2023)      (2) patient will obtain 16 deg R ankle eversion ACTIVE RANGE OF MOTION to facilitate improved ability to negotiate uneven terrain for attending grand children's sporting events (ongoing, partially met, 8/1/2023)      (3) patient " will obtain 5 deg R ankle DF ACTIVE RANGE OF MOTION to facilitate improved ability to clear obstacles in pathway (progressing, nearly met, 8/1/2023)      Long Term Goals: 4 weeks   (1) patient will obtain R ankle eversion 4+/5 MMT to indicate improved ability to participate in vacuuming according to previous level of function (progressing, not met, 8/1/2023)      (2) patient will present with 18.8cm circumferential measure at R METs to indicate improved ability to wear 2 inch heels (progressing, not met, 8/1/2023)      (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     Outpatient Physical Therapy 2 times weekly for 2-3 weeks to include the following interventions: Cervical/Lumbar Traction, Electrical Stimulation re-eval, dry needling, Gait Training, Manual Therapy, Moist Heat/ Ice, Neuromuscular Re-ed, Patient Education, Self Care, Therapeutic Activities, and Therapeutic Exercise.      Physical therapist and physical therapy assistant(s) met face to face to discuss patient's treatment plan and progress towards established goals. Pt will be seen by a physical therapist minimally every 6th visit or every 30 days.    Luzmaria Goncalves, PT

## 2023-08-29 ENCOUNTER — CLINICAL SUPPORT (OUTPATIENT)
Dept: REHABILITATION | Facility: HOSPITAL | Age: 63
End: 2023-08-29
Payer: COMMERCIAL

## 2023-08-29 DIAGNOSIS — M20.11 HALLUX ABDUCTOVALGUS, RIGHT: Primary | ICD-10-CM

## 2023-08-29 DIAGNOSIS — M20.5X1 MALLET TOE, RIGHT: ICD-10-CM

## 2023-08-29 DIAGNOSIS — R26.2 DIFFICULTY WALKING: ICD-10-CM

## 2023-08-29 DIAGNOSIS — M21.621 TAILOR'S BUNION OF RIGHT FOOT: ICD-10-CM

## 2023-08-29 PROCEDURE — 97110 THERAPEUTIC EXERCISES: CPT | Mod: CQ

## 2023-08-29 PROCEDURE — 97116 GAIT TRAINING THERAPY: CPT | Mod: CQ

## 2023-08-29 PROCEDURE — 97530 THERAPEUTIC ACTIVITIES: CPT | Mod: CQ

## 2023-08-29 NOTE — PROGRESS NOTES
"[facility]   Physical Therapy Treatment Note      Name: Kirti Romero  Clinic Number: [id number]    Therapy Diagnosis:   Encounter Diagnoses   Name Primary?    Hallux abductovalgus, right Yes    Mallet toe, right     Tailor's bunion of right foot     Difficulty walking        Physician: Arthur Alcocer DPM    Visit Date: 8/29/2023    Physician Orders: PT Eval and Treat   Medical Diagnosis from Referral: Z98.890 (ICD-10-CM) - Postoperative state   M62.81 (ICD-10-CM) - Muscle weakness of lower extremity   Evaluation Date: 6/14/2023  Authorization Period Expiration: 12/31/2023  Plan of Care Expiration: 9/30/2023  Visit # / Visits authorized: 22/32  PTA Visit #: 1/5     Foto #1/3 on 6/14/2023, #2/3 on 6/29/2023, #3/3 on 7/12/2023 - closed   Time In: 9:05 am  Time Out: 10:01 am  Total Billable Time: 56 minutes    Subjective     HOME EXERCISE PROGRAM: reviewed, compliant   Response to previous treatment: R lateral 3 digits ran into something between PHYSICAL THERAPY visits so there is more pain present today. Increased walking yesterday and notes increased symptoms at bottom of R foot/toes   Function: walking is improving and rest days are helping     Pain: 0/10  Location: plantar surface of R foot at 1st MET     Objective      Objective Measures updated at progress report unless specified.     Treatment     Bold = performed   Blue = increase reps/resistance  + = initiated exercise     therapeutic exercises to develop strength, endurance, ROM, flexibility, posture, and core stabilization for 12 minutes:  R ankle PF/DF/EV/IV in long sitting with red band: 2x15  R marble  x 1 cup  Toe yoga, big toe up then small toes up, 3x10  Toe crunches, #2 on towel, x30  Heel raises from 4" step, eccentric emphasis at L LE: 3x10   Heel raises, B concentric RLE eccentric: on hold secondary to pain  Gastroc stretch, incline board, 30"x3  Soleus stretch, incline board, 30"x3  Plantar fascia " "stretch, incline board, 30"x5  Dynamic drinking birds, x 4 laps     manual therapy techniques: Joint mobilizations, Manual traction, Myofacial release, Soft tissue Mobilization, and Friction Massage for 0 minutes:  R Gr III/IV JM TCJ, 1st MET mobilizations with wedge   Great toe distraction and genu varus Gr III/IV JM  R STM to minimize localized swelling and address tightness   R Gr III/IV JM 1st MET     neuromuscular re-education activities to improve: Balance, Coordination, Kinesthetic, Sense, Proprioception, and Posture for 6 minutes:  Static standing in tandem stance on Airex pad, 30" hold x 3 each LE  Single leg standing, R/L, airex mat, 30"x5   Step over on BOSU ball, round side up (R leg on ball, L LE to the ground): 3x10  Static standing on BOSU ball, flat side up: 2x15 sec    therapeutic activities to improve functional performance for 25 minutes:  Upright bike, 5.5 resistance  x 5 minutes   Lunges, R/L LE forward, unilateral HRA, x 20 each   Leg press, R/L LE squat, 3" hold each way, 1 black cord & 1 red cord, 2x15 each  Leg press, heel lifts, B LE s with R LE lowering, 3" hold each way, 2x15  Step down, L LE leading, 6inch step, B HRA, 15" x 8     gait training to improve functional mobility and safety for 13 minutes, including:  Fwd/bkw weight shifting, patient standing on [place] fitter board, R LE,  B HRA, 1' three times   Dynamic heel lifts x 2 laps     Patient Education and Home Exercises       Education provided:   - Patient was educated on all therapeutic interventions performed during today's treatment visit.     Home Exercises Provided: Yes & Patient instructed to cont prior HEP. Exercises were reviewed and Kirti was able to demonstrate them prior to the end of the session.  Kirti demonstrated good  understanding of the education provided. See EMR under Patient Instructions for exercises provided during therapy sessions    Assessment   Patient demonstrates excellent knowledge, technique and " fluidity with all therapeutic exercises. Emphasis on extension stretching for R 1st toe today via manual JM and stretches during step downs. Patient completes with appropriate stretch to targeted structures.     Kirti is making good progress towards meeting set goals.   Pt prognosis is Good.     Pt will continue to benefit from skilled outpatient physical therapy to address the deficits listed in the problem list box on initial evaluation, provide pt/family education and to maximize pt's level of independence in the home and community environment.     Pt's spiritual, cultural and educational needs considered and pt agreeable to plan of care and goals.     Anticipated barriers to physical therapy: co morbididities     Goals:   Short Term Goals: 2 weeks   (1) patient will be I with HOME EXERCISE PROGRAM (met, 8/1/2023)      (2) patient will obtain 16 deg R ankle eversion ACTIVE RANGE OF MOTION to facilitate improved ability to negotiate uneven terrain for attending grand children's sporting events (ongoing, partially met, 8/1/2023)      (3) patient will obtain 5 deg R ankle DF ACTIVE RANGE OF MOTION to facilitate improved ability to clear obstacles in pathway (progressing, nearly met, 8/1/2023)      Long Term Goals: 4 weeks   (1) patient will obtain R ankle eversion 4+/5 MMT to indicate improved ability to participate in vacuuming according to previous level of function (progressing, not met, 8/1/2023)      (2) patient will present with 18.8cm circumferential measure at R METs to indicate improved ability to wear 2 inch heels (progressing, not met, 8/1/2023)      (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     Outpatient Physical Therapy 2 times weekly for 2-3 weeks to include the following interventions: Cervical/Lumbar Traction, Electrical Stimulation re-eval, dry needling, Gait Training, Manual Therapy, Moist Heat/ Ice, Neuromuscular Re-ed, Patient Education, Self Care, Therapeutic Activities, and Therapeutic Exercise.      Physical therapist and physical therapy assistant(s) met face to face to discuss patient's treatment plan and progress towards established goals. Pt will be seen by a physical therapist minimally every 6th visit or every 30 days.    Damir Stroud, PTA

## 2023-08-31 ENCOUNTER — CLINICAL SUPPORT (OUTPATIENT)
Dept: REHABILITATION | Facility: HOSPITAL | Age: 63
End: 2023-08-31
Payer: COMMERCIAL

## 2023-08-31 DIAGNOSIS — M21.621 TAILOR'S BUNION OF RIGHT FOOT: ICD-10-CM

## 2023-08-31 DIAGNOSIS — R26.2 DIFFICULTY WALKING: ICD-10-CM

## 2023-08-31 DIAGNOSIS — M20.5X1 MALLET TOE, RIGHT: ICD-10-CM

## 2023-08-31 DIAGNOSIS — M20.11 HALLUX ABDUCTOVALGUS, RIGHT: Primary | ICD-10-CM

## 2023-08-31 PROCEDURE — 97116 GAIT TRAINING THERAPY: CPT

## 2023-08-31 PROCEDURE — 97140 MANUAL THERAPY 1/> REGIONS: CPT

## 2023-08-31 PROCEDURE — 97530 THERAPEUTIC ACTIVITIES: CPT

## 2023-08-31 PROCEDURE — 97110 THERAPEUTIC EXERCISES: CPT

## 2023-08-31 NOTE — PROGRESS NOTES
[facility]   Physical Therapy Discharge Note      Name: Kirti Romero  Clinic Number: [id number]    Therapy Diagnosis:   Encounter Diagnoses   Name Primary?    Hallux abductovalgus, right Yes    Mallet toe, right     Tailor's bunion of right foot     Difficulty walking      Physician: Arthur Alcocer DPM    Visit Date: 8/31/2023    Physician Orders: PT Eval and Treat   Medical Diagnosis from Referral: Z98.890 (ICD-10-CM) - Postoperative state   M62.81 (ICD-10-CM) - Muscle weakness of lower extremity   Evaluation Date: 6/14/2023  Authorization Period Expiration: 12/31/2023  Plan of Care Expiration: 9/30/2023  Visit # / Visits authorized: 22/32  PTA Visit #: 1/5     Foto #1/3 on 6/14/2023, #2/3 on 6/29/2023, #3/3 on 7/12/2023 - closed   Time In: 10:05am  Time Out: 11:55am  Total Billable Time: 110 minutes    Subjective     HOME EXERCISE PROGRAM: reviewed, compliant   Response to previous treatment: feeling less pain at the bottom of her R foot and base of toes   Function: walking is improving and rest days are helping     Pain: 0/10  Location: plantar surface of R foot at 1st MET     Objective    8/31/2023:  Observation/posture: patient rests in about 5 degrees of R great toe valgus     Gait/assistive device: slight impairment during toe off      Range of Motion: AROM:  Ankle Right  Left    Dorsiflexion 6 5   Plantarflexion 73 70   Inversion 35 40   Eversion 8 16   Great toe flex -15 to -3   35      Strength:  Ankle Right  Left    Dorsiflexion 4+/5 4+/5   Plantarflexion 4/5 4/5   Inversion 4/5 4/5   Eversion 4/5 4+/5      Joint Mobility:   TCJ, STJ, MET, 1st met: R hypomobile, painful      Palpation/sensation:   LE light touch sensation: mild altered sensation at incision of dorsum of distal incision      Flexibility:   R Plantar fascia: restricted      Function/balance:   R SLS: tba  L SLS: tba     Edema:   R ankle figure of eight: 45.5 cm  L ankle figure of eight: 46.5 cm     R  "ankle joint line: 22.5 cm  L ankle joint line: 22.0 cm     R mets: 19.6 cm  L mets: 18.5 cm     Limitation/Restriction for FOTO Foot Survey     Therapist reviewed FOTO scores   FOTO documents entered into EPIC - see Media section.     Limitation Score: 77      Treatment     Bold = performed   Blue = increase reps/resistance  + = initiated exercise     therapeutic exercises to develop strength, endurance, ROM, flexibility, posture, and core stabilization for 35 minutes:  R ankle PF/DF/EV/IV in long sitting with red band: 2x15  R marble  x 1 cup  Toe yoga, big toe up then small toes up, 3x10  Toe crunches, #2 on towel, x30  Heel raises from 4" step, eccentric emphasis at L LE: 3x10   Heel raises, B concentric RLE eccentric: on hold secondary to pain  Gastroc stretch, incline board, 30"x3  Soleus stretch, incline board, 30"x3  Plantar fascia stretch, incline board, 30"x5  Dynamic drinking birds, x 4 laps  Review of HOME EXERCISE PROGRAM and progressions to HOME EXERCISE PROGRAM for the next month (emphasis on heel raises balance)   Updated PHYSICAL THERAPY POC     manual therapy techniques: Joint mobilizations, Manual traction, Myofacial release, Soft tissue Mobilization, and Friction Massage for 25 minutes:  R Gr III/IV JM TCJ, 1st MET mobilizations with wedge   Great toe distraction and genu varus Gr III/IV JM  R STM to minimize localized swelling and address tightness   R Gr III/IV JM 1st MET     neuromuscular re-education activities to improve: Balance, Coordination, Kinesthetic, Sense, Proprioception, and Posture for 6 minutes:  Static standing in tandem stance on Airex pad, 30" hold x 3 each LE  Single leg standing, R/L, airex mat, 30"x5   Step over on BOSU ball, round side up (R leg on ball, L LE to the ground): 3x10  Static standing on BOSU ball, flat side up: 2x15 sec    therapeutic activities to improve functional performance for 29 minutes:  Upright bike, 7.0 resistance  x 5 minutes   Lunges, R/L LE " "forward, unilateral HRA, x 3 laps  Leg press R unilateral LE squat, 2 black cords, 2x15  Leg press, heel lifts, B LE s with R LE lowering, 3" hold each way, 2x15  Step down, L LE leading, 6inch step, B HRA, 15" x 8     gait training to improve functional mobility and safety for 15 minutes, including:  Fwd/bkw weight shifting, patient standing on [place] fitter board, R LE,  B HRA, 1' three times   Dynamic heel lifts x 3 laps     Patient Education and Home Exercises       Education provided:   - Patient was educated on all therapeutic interventions performed during today's treatment visit.     Home Exercises Provided: Yes & Patient instructed to cont prior HEP. Exercises were reviewed and Kirti was able to demonstrate them prior to the end of the session.  Kirti demonstrated good  understanding of the education provided. See EMR under Patient Instructions for exercises provided during therapy sessions    Assessment   Review of progression of heel lifts and balance training completed with updated POC today. Ankle/foot MMT, ACTIVE RANGE OF MOTION and balance has improved since onset of PHYSICAL THERAPY POC. Patient is I with HOME EXERCISE PROGRAM and is being discharged today.     Kirti is making good progress towards meeting set goals.   Pt prognosis is Good.     Pt's spiritual, cultural and educational needs considered and pt agreeable to plan of care and goals.     Anticipated barriers to physical therapy: co morbididities     Goals:   Short Term Goals: 2 weeks   (1) patient will be I with HOME EXERCISE PROGRAM (met, 8/1/2023)      (2) patient will obtain 16 deg R ankle eversion ACTIVE RANGE OF MOTION to facilitate improved ability to negotiate uneven terrain for attending grand children's sporting events (partially met, 8/31/2023)      (3) patient will obtain 5 deg R ankle DF ACTIVE RANGE OF MOTION to facilitate improved ability to clear obstacles in pathway ( met, 8/31/2023)      Long Term Goals: 4 weeks   (1) patient " will obtain R ankle eversion 4+/5 MMT to indicate improved ability to participate in vacuuming according to previous level of function (nearly met, 8/31/2023)      (2) patient will present with 18.8cm circumferential measure at R METs to indicate improved ability to wear 2 inch heels ( met, 8/31/2023)      (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     Patient is being discharged to HOME EXERCISE PROGRAM.      Physical therapist and physical therapy assistant(s) met face to face to discuss patient's treatment plan and progress towards established goals. Pt will be seen by a physical therapist minimally every 6th visit or every 30 days.    Luzmaria Goncalves, PT              I CERTIFY THE NEED FOR THESE SERVICES FURNISHED UNDER THIS PLAN OF TREATMENT AND WHILE UNDER MY CARE     Physician's comments:           Physician's Signature: ___________________________________________________

## 2023-09-01 NOTE — PLAN OF CARE
[facility]   Physical Therapy Discharge Note      Name: Kirti Romero  Clinic Number: [id number]    Therapy Diagnosis:   Encounter Diagnoses   Name Primary?    Hallux abductovalgus, right Yes    Mallet toe, right     Tailor's bunion of right foot     Difficulty walking      Physician: Arthur Alcocer DPM    Visit Date: 8/31/2023    Physician Orders: PT Eval and Treat   Medical Diagnosis from Referral: Z98.890 (ICD-10-CM) - Postoperative state   M62.81 (ICD-10-CM) - Muscle weakness of lower extremity   Evaluation Date: 6/14/2023  Authorization Period Expiration: 12/31/2023  Plan of Care Expiration: 9/30/2023  Visit # / Visits authorized: 22/32  PTA Visit #: 1/5     Foto #1/3 on 6/14/2023, #2/3 on 6/29/2023, #3/3 on 7/12/2023 - closed   Time In: 10:05am  Time Out: 11:55am  Total Billable Time: 110 minutes    Subjective     HOME EXERCISE PROGRAM: reviewed, compliant   Response to previous treatment: feeling less pain at the bottom of her R foot and base of toes   Function: walking is improving and rest days are helping     Pain: 0/10  Location: plantar surface of R foot at 1st MET     Objective    8/31/2023:  Observation/posture: patient rests in about 5 degrees of R great toe valgus     Gait/assistive device: slight impairment during toe off      Range of Motion: AROM:  Ankle Right  Left    Dorsiflexion 6 5   Plantarflexion 73 70   Inversion 35 40   Eversion 8 16   Great toe flex -15 to -3   35      Strength:  Ankle Right  Left    Dorsiflexion 4+/5 4+/5   Plantarflexion 4/5 4/5   Inversion 4/5 4/5   Eversion 4/5 4+/5      Joint Mobility:   TCJ, STJ, MET, 1st met: R hypomobile, painful      Palpation/sensation:   LE light touch sensation: mild altered sensation at incision of dorsum of distal incision      Flexibility:   R Plantar fascia: restricted      Function/balance:   R SLS: tba  L SLS: tba     Edema:   R ankle figure of eight: 45.5 cm  L ankle figure of eight: 46.5 cm     R  "ankle joint line: 22.5 cm  L ankle joint line: 22.0 cm     R mets: 19.6 cm  L mets: 18.5 cm     Limitation/Restriction for FOTO Foot Survey     Therapist reviewed FOTO scores   FOTO documents entered into EPIC - see Media section.     Limitation Score: 77      Treatment     Bold = performed   Blue = increase reps/resistance  + = initiated exercise     therapeutic exercises to develop strength, endurance, ROM, flexibility, posture, and core stabilization for 35 minutes:  R ankle PF/DF/EV/IV in long sitting with red band: 2x15  R marble  x 1 cup  Toe yoga, big toe up then small toes up, 3x10  Toe crunches, #2 on towel, x30  Heel raises from 4" step, eccentric emphasis at L LE: 3x10   Heel raises, B concentric RLE eccentric: on hold secondary to pain  Gastroc stretch, incline board, 30"x3  Soleus stretch, incline board, 30"x3  Plantar fascia stretch, incline board, 30"x5  Dynamic drinking birds, x 4 laps  Review of HOME EXERCISE PROGRAM and progressions to HOME EXERCISE PROGRAM for the next month (emphasis on heel raises balance)   Updated PHYSICAL THERAPY POC     manual therapy techniques: Joint mobilizations, Manual traction, Myofacial release, Soft tissue Mobilization, and Friction Massage for 25 minutes:  R Gr III/IV JM TCJ, 1st MET mobilizations with wedge   Great toe distraction and genu varus Gr III/IV JM  R STM to minimize localized swelling and address tightness   R Gr III/IV JM 1st MET     neuromuscular re-education activities to improve: Balance, Coordination, Kinesthetic, Sense, Proprioception, and Posture for 6 minutes:  Static standing in tandem stance on Airex pad, 30" hold x 3 each LE  Single leg standing, R/L, airex mat, 30"x5   Step over on BOSU ball, round side up (R leg on ball, L LE to the ground): 3x10  Static standing on BOSU ball, flat side up: 2x15 sec    therapeutic activities to improve functional performance for 29 minutes:  Upright bike, 7.0 resistance  x 5 minutes   Lunges, R/L LE " "forward, unilateral HRA, x 3 laps  Leg press R unilateral LE squat, 2 black cords, 2x15  Leg press, heel lifts, B LE s with R LE lowering, 3" hold each way, 2x15  Step down, L LE leading, 6inch step, B HRA, 15" x 8     gait training to improve functional mobility and safety for 15 minutes, including:  Fwd/bkw weight shifting, patient standing on [place] fitter board, R LE,  B HRA, 1' three times   Dynamic heel lifts x 3 laps     Patient Education and Home Exercises       Education provided:   - Patient was educated on all therapeutic interventions performed during today's treatment visit.     Home Exercises Provided: Yes & Patient instructed to cont prior HEP. Exercises were reviewed and Kirti was able to demonstrate them prior to the end of the session.  Kirti demonstrated good  understanding of the education provided. See EMR under Patient Instructions for exercises provided during therapy sessions    Assessment   Review of progression of heel lifts and balance training completed with updated POC today. Ankle/foot MMT, ACTIVE RANGE OF MOTION and balance has improved since onset of PHYSICAL THERAPY POC. Patient is I with HOME EXERCISE PROGRAM and is being discharged today.     Kirti is making good progress towards meeting set goals.   Pt prognosis is Good.     Pt's spiritual, cultural and educational needs considered and pt agreeable to plan of care and goals.     Anticipated barriers to physical therapy: co morbididities     Goals:   Short Term Goals: 2 weeks   (1) patient will be I with HOME EXERCISE PROGRAM (met, 8/1/2023)      (2) patient will obtain 16 deg R ankle eversion ACTIVE RANGE OF MOTION to facilitate improved ability to negotiate uneven terrain for attending grand children's sporting events (partially met, 8/31/2023)      (3) patient will obtain 5 deg R ankle DF ACTIVE RANGE OF MOTION to facilitate improved ability to clear obstacles in pathway ( met, 8/31/2023)      Long Term Goals: 4 weeks   (1) patient " will obtain R ankle eversion 4+/5 MMT to indicate improved ability to participate in vacuuming according to previous level of function (nearly met, 8/31/2023)      (2) patient will present with 18.8cm circumferential measure at R METs to indicate improved ability to wear 2 inch heels ( met, 8/31/2023)      (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     Patient is being discharged to HOME EXERCISE PROGRAM.      Physical therapist and physical therapy assistant(s) met face to face to discuss patient's treatment plan and progress towards established goals. Pt will be seen by a physical therapist minimally every 6th visit or every 30 days.    Luzmaria Goncalves, PT              I CERTIFY THE NEED FOR THESE SERVICES FURNISHED UNDER THIS PLAN OF TREATMENT AND WHILE UNDER MY CARE     Physician's comments:           Physician's Signature: ___________________________________________________

## 2023-09-07 ENCOUNTER — OFFICE VISIT (OUTPATIENT)
Dept: INTERNAL MEDICINE | Facility: CLINIC | Age: 63
End: 2023-09-07
Payer: COMMERCIAL

## 2023-09-07 VITALS
SYSTOLIC BLOOD PRESSURE: 100 MMHG | WEIGHT: 108.44 LBS | HEART RATE: 64 BPM | BODY MASS INDEX: 19.96 KG/M2 | DIASTOLIC BLOOD PRESSURE: 62 MMHG | OXYGEN SATURATION: 100 % | HEIGHT: 62 IN

## 2023-09-07 DIAGNOSIS — Z00.00 ROUTINE PHYSICAL EXAMINATION: Primary | ICD-10-CM

## 2023-09-07 DIAGNOSIS — E03.9 HYPOTHYROIDISM, UNSPECIFIED TYPE: ICD-10-CM

## 2023-09-07 DIAGNOSIS — E53.8 B12 DEFICIENCY: ICD-10-CM

## 2023-09-07 DIAGNOSIS — Z12.31 ENCOUNTER FOR SCREENING MAMMOGRAM FOR MALIGNANT NEOPLASM OF BREAST: ICD-10-CM

## 2023-09-07 DIAGNOSIS — E55.9 VITAMIN D DEFICIENCY: ICD-10-CM

## 2023-09-07 PROCEDURE — 1159F MED LIST DOCD IN RCRD: CPT | Mod: CPTII,S$GLB,, | Performed by: INTERNAL MEDICINE

## 2023-09-07 PROCEDURE — 99396 PREV VISIT EST AGE 40-64: CPT | Mod: S$GLB,,, | Performed by: INTERNAL MEDICINE

## 2023-09-07 PROCEDURE — 1160F RVW MEDS BY RX/DR IN RCRD: CPT | Mod: CPTII,S$GLB,, | Performed by: INTERNAL MEDICINE

## 2023-09-07 PROCEDURE — 99999 PR PBB SHADOW E&M-EST. PATIENT-LVL III: CPT | Mod: PBBFAC,,, | Performed by: INTERNAL MEDICINE

## 2023-09-07 PROCEDURE — 1159F PR MEDICATION LIST DOCUMENTED IN MEDICAL RECORD: ICD-10-PCS | Mod: CPTII,S$GLB,, | Performed by: INTERNAL MEDICINE

## 2023-09-07 PROCEDURE — 3078F DIAST BP <80 MM HG: CPT | Mod: CPTII,S$GLB,, | Performed by: INTERNAL MEDICINE

## 2023-09-07 PROCEDURE — 99999 PR PBB SHADOW E&M-EST. PATIENT-LVL III: ICD-10-PCS | Mod: PBBFAC,,, | Performed by: INTERNAL MEDICINE

## 2023-09-07 PROCEDURE — 3078F PR MOST RECENT DIASTOLIC BLOOD PRESSURE < 80 MM HG: ICD-10-PCS | Mod: CPTII,S$GLB,, | Performed by: INTERNAL MEDICINE

## 2023-09-07 PROCEDURE — 3008F BODY MASS INDEX DOCD: CPT | Mod: CPTII,S$GLB,, | Performed by: INTERNAL MEDICINE

## 2023-09-07 PROCEDURE — 99396 PR PREVENTIVE VISIT,EST,40-64: ICD-10-PCS | Mod: S$GLB,,, | Performed by: INTERNAL MEDICINE

## 2023-09-07 PROCEDURE — 3074F SYST BP LT 130 MM HG: CPT | Mod: CPTII,S$GLB,, | Performed by: INTERNAL MEDICINE

## 2023-09-07 PROCEDURE — 3074F PR MOST RECENT SYSTOLIC BLOOD PRESSURE < 130 MM HG: ICD-10-PCS | Mod: CPTII,S$GLB,, | Performed by: INTERNAL MEDICINE

## 2023-09-07 PROCEDURE — 3008F PR BODY MASS INDEX (BMI) DOCUMENTED: ICD-10-PCS | Mod: CPTII,S$GLB,, | Performed by: INTERNAL MEDICINE

## 2023-09-07 PROCEDURE — 1160F PR REVIEW ALL MEDS BY PRESCRIBER/CLIN PHARMACIST DOCUMENTED: ICD-10-PCS | Mod: CPTII,S$GLB,, | Performed by: INTERNAL MEDICINE

## 2023-09-07 PROCEDURE — 3044F PR MOST RECENT HEMOGLOBIN A1C LEVEL <7.0%: ICD-10-PCS | Mod: CPTII,S$GLB,, | Performed by: INTERNAL MEDICINE

## 2023-09-07 PROCEDURE — 3044F HG A1C LEVEL LT 7.0%: CPT | Mod: CPTII,S$GLB,, | Performed by: INTERNAL MEDICINE

## 2023-09-07 RX ORDER — HYDROXYZINE HYDROCHLORIDE 25 MG/1
TABLET, FILM COATED ORAL
Qty: 30 TABLET | Refills: 3 | Status: SHIPPED | OUTPATIENT
Start: 2023-09-07

## 2023-09-07 RX ORDER — ESTRADIOL 1 MG/1
1 TABLET ORAL DAILY
Qty: 90 TABLET | Refills: 3 | Status: SHIPPED | OUTPATIENT
Start: 2023-09-07

## 2023-09-07 RX ORDER — LEVOTHYROXINE SODIUM 75 UG/1
75 TABLET ORAL DAILY
Qty: 90 TABLET | Refills: 3 | Status: SHIPPED | OUTPATIENT
Start: 2023-09-07

## 2023-09-07 NOTE — PROGRESS NOTES
Subjective:       Patient ID: Kirti Romero is a 62 y.o. female.    Chief Complaint: Annual Exam    Patient here for annual exam.  Generally doing well.  Taking her thyroid medication.  She needed a few other refills.  Will see gyn upcoming and get a mammogram.    Labs all reviewed and stable.  Reviewed in detail.  Continue regular exercise.  She did well with her foot surgery and is just about finished her therapy      Review of Systems   Constitutional:  Negative for activity change and unexpected weight change.   HENT:  Negative for hearing loss, rhinorrhea and trouble swallowing.    Eyes:  Negative for discharge and visual disturbance.   Respiratory:  Negative for chest tightness and wheezing.    Cardiovascular:  Negative for chest pain and palpitations.   Gastrointestinal:  Negative for blood in stool, constipation, diarrhea and vomiting.   Endocrine: Negative for polydipsia and polyuria.   Genitourinary:  Negative for difficulty urinating, dysuria, hematuria and menstrual problem.   Musculoskeletal:  Positive for arthralgias (improving postop foot). Negative for joint swelling and neck pain.   Neurological:  Negative for weakness and headaches.   Psychiatric/Behavioral:  Negative for confusion and dysphoric mood.            Past Medical History:   Diagnosis Date    Abnormal Pap smear     Treated with Hysterectomy    Basal cell carcinoma     Breast cyst 03/01/2016    rt breast    Breast disorder      Past Surgical History:   Procedure Laterality Date    AUGMENTATION OF BREAST Bilateral 2003    BREAST BIOPSY Right 03/01/2016    cyst that popped    BREAST SURGERY Bilateral 06/01/2003    COLONOSCOPY N/A 9/22/2020    Procedure: COLONOSCOPY;  Surgeon: Alberto Santos MD;  Location: [address];  Service: Endoscopy;  Laterality: N/A;  COVID test on 9/26/20 at [facility] prep    HYSTERECTOMY  09/1998    full 38    LAPIDUS BUNIONECTOMY Right 4/21/2023    Procedure: BUNIONECTOMY, LAPIDUS;   Surgeon: Arthur Alcocer DPM;  Location: [facility] OR;  Service: Podiatry;  Laterality: Right;  mini c-arm, Lapiplasty set(Sal) notified cc  bunionectomy samantha notified cc    OOPHORECTOMY  1998    MARGARET BUNIONECTOMY Right 4/21/2023    Procedure: EXCISION, BUNIONETTE;  Surgeon: Arthur Alcocer DPM;  Location: [facility] OR;  Service: Podiatry;  Laterality: Right;  5th toe    MARGARET BUNIONECTOMY Right 4/21/2023    Procedure: EXCISION, BUNIONETTE;  Surgeon: Arthur Alcocer DPM;  Location: [facility] OR;  Service: Podiatry;  Laterality: Right;  Arthrex screws    TENOTOMY Right 4/21/2023    Procedure: TENOTOMY;  Surgeon: Arthur Alcocer DPM;  Location: [facility] OR;  Service: Podiatry;  Laterality: Right;  4th toe    TONSILLECTOMY        Patient Active Problem List   Diagnosis    Hypothyroidism    Hallux abductovalgus, right    Mallet toe, right    Tailor's bunion of right foot    Difficulty walking        Objective:      Physical Exam  Constitutional:       General: She is not in acute distress.     Appearance: She is well-developed.   HENT:      Head: Normocephalic and atraumatic.      Right Ear: Tympanic membrane, ear canal and external ear normal.      Left Ear: Tympanic membrane, ear canal and external ear normal.      Mouth/Throat:      Pharynx: No oropharyngeal exudate or posterior oropharyngeal erythema.   Eyes:      General: No scleral icterus.     Conjunctiva/sclera: Conjunctivae normal.      Pupils: Pupils are equal, round, and reactive to light.   Neck:      Thyroid: No thyromegaly.   Cardiovascular:      Rate and Rhythm: Normal rate and regular rhythm.      Pulses: Normal pulses.      Heart sounds: No murmur heard.  Pulmonary:      Effort: Pulmonary effort is normal.      Breath sounds: Normal breath sounds. No wheezing.   Abdominal:      General: Bowel sounds are normal. There is no distension.      Palpations: Abdomen is soft.      Tenderness: There is no abdominal tenderness.   Musculoskeletal:          General: No tenderness.      Cervical back: Normal range of motion and neck supple.      Right lower leg: No edema.      Left lower leg: No edema.   Lymphadenopathy:      Cervical: No cervical adenopathy.   Skin:     Coloration: Skin is not jaundiced.      Findings: No rash.   Neurological:      General: No focal deficit present.      Mental Status: She is alert and oriented to person, place, and time.   Psychiatric:         Mood and Affect: Mood normal.         Behavior: Behavior normal.         Assessment:       Problem List Items Addressed This Visit          Endocrine    Hypothyroidism    Relevant Orders    Vitamin D    Vitamin B12    TSH    CBC Auto Differential    Comprehensive Metabolic Panel    Hemoglobin A1C    Lipid Panel     Other Visit Diagnoses       Routine physical examination    -  Primary    Relevant Orders    Vitamin D    Vitamin B12    TSH    CBC Auto Differential    Comprehensive Metabolic Panel    Hemoglobin A1C    Lipid Panel    Encounter for screening mammogram for malignant neoplasm of breast        Relevant Orders    Mammo Digital Screening Bilat w/ Bacilio    Vitamin D    Vitamin B12    TSH    CBC Auto Differential    Comprehensive Metabolic Panel    Hemoglobin A1C    Lipid Panel    B12 deficiency        Relevant Orders    Vitamin D    Vitamin B12    TSH    CBC Auto Differential    Comprehensive Metabolic Panel    Hemoglobin A1C    Lipid Panel    Vitamin D deficiency        Relevant Orders    Vitamin D    Vitamin B12    TSH    CBC Auto Differential    Comprehensive Metabolic Panel    Hemoglobin A1C    Lipid Panel            Plan:         Kirti Nagel was seen today for annual exam.    Diagnoses and all orders for this visit:    Routine physical examination  -     Vitamin D; Future  -     Vitamin B12; Future  -     TSH; Future  -     CBC Auto Differential; Future  -     Comprehensive Metabolic Panel; Future  -     Hemoglobin A1C; Future  -     Lipid Panel; Future  -     Vitamin D  -     Vitamin  B12  -     TSH  -     CBC Auto Differential  -     Comprehensive Metabolic Panel  -     Hemoglobin A1C  -     Lipid Panel    Encounter for screening mammogram for malignant neoplasm of breast  -     Mammo Digital Screening Bilat w/ Bacilio; Future  -     Vitamin D; Future  -     Vitamin B12; Future  -     TSH; Future  -     CBC Auto Differential; Future  -     Comprehensive Metabolic Panel; Future  -     Hemoglobin A1C; Future  -     Lipid Panel; Future  -     Vitamin D  -     Vitamin B12  -     TSH  -     CBC Auto Differential  -     Comprehensive Metabolic Panel  -     Hemoglobin A1C  -     Lipid Panel    Hypothyroidism, unspecified type  -     Vitamin D; Future  -     Vitamin B12; Future  -     TSH; Future  -     CBC Auto Differential; Future  -     Comprehensive Metabolic Panel; Future  -     Hemoglobin A1C; Future  -     Lipid Panel; Future  -     Vitamin D  -     Vitamin B12  -     TSH  -     CBC Auto Differential  -     Comprehensive Metabolic Panel  -     Hemoglobin A1C  -     Lipid Panel    B12 deficiency  -     Vitamin D; Future  -     Vitamin B12; Future  -     TSH; Future  -     CBC Auto Differential; Future  -     Comprehensive Metabolic Panel; Future  -     Hemoglobin A1C; Future  -     Lipid Panel; Future  -     Vitamin D  -     Vitamin B12  -     TSH  -     CBC Auto Differential  -     Comprehensive Metabolic Panel  -     Hemoglobin A1C  -     Lipid Panel    Vitamin D deficiency  -     Vitamin D; Future  -     Vitamin B12; Future  -     TSH; Future  -     CBC Auto Differential; Future  -     Comprehensive Metabolic Panel; Future  -     Hemoglobin A1C; Future  -     Lipid Panel; Future  -     Vitamin D  -     Vitamin B12  -     TSH  -     CBC Auto Differential  -     Comprehensive Metabolic Panel  -     Hemoglobin A1C  -     Lipid Panel    Other orders  -     levothyroxine (SYNTHROID) 75 MCG tablet; Take 1 tablet (75 mcg total) by mouth once daily.  -     hydrOXYzine HCL (ATARAX) 25 MG tablet; 1/2 to 1  "nightly prn.  -     estradioL (ESTRACE) 1 MG tablet; Take 1 tablet (1 mg total) by mouth once daily.                     Portions of this note may have been created with voice recognition software. Occasional "wrong-word" or "sound-a-like" substitutions may have occurred due to the inherent limitations of voice recognition software. Please, read the note carefully and recognize, using context, where substitutions have occurred.  "

## 2023-09-08 ENCOUNTER — PATIENT MESSAGE (OUTPATIENT)
Dept: PODIATRY | Facility: CLINIC | Age: 63
End: 2023-09-08
Payer: COMMERCIAL

## 2023-09-14 ENCOUNTER — OFFICE VISIT (OUTPATIENT)
Dept: DERMATOLOGY | Facility: CLINIC | Age: 63
End: 2023-09-14
Payer: COMMERCIAL

## 2023-09-14 DIAGNOSIS — L81.4 LENTIGINES: ICD-10-CM

## 2023-09-14 DIAGNOSIS — D18.01 ANGIOMA OF SKIN: ICD-10-CM

## 2023-09-14 DIAGNOSIS — L57.0 ACTINIC KERATOSIS: Primary | ICD-10-CM

## 2023-09-14 DIAGNOSIS — L82.0 INFLAMED SEBORRHEIC KERATOSIS: ICD-10-CM

## 2023-09-14 DIAGNOSIS — Z85.828 HISTORY OF NONMELANOMA SKIN CANCER: ICD-10-CM

## 2023-09-14 DIAGNOSIS — L82.1 SEBORRHEIC KERATOSIS: ICD-10-CM

## 2023-09-14 DIAGNOSIS — Z12.83 SKIN CANCER SCREENING: ICD-10-CM

## 2023-09-14 PROCEDURE — 17110 DESTRUCTION B9 LES UP TO 14: CPT | Mod: S$GLB,,, | Performed by: DERMATOLOGY

## 2023-09-14 PROCEDURE — 99213 PR OFFICE/OUTPT VISIT, EST, LEVL III, 20-29 MIN: ICD-10-PCS | Mod: 25,S$GLB,, | Performed by: DERMATOLOGY

## 2023-09-14 PROCEDURE — 1160F PR REVIEW ALL MEDS BY PRESCRIBER/CLIN PHARMACIST DOCUMENTED: ICD-10-PCS | Mod: CPTII,S$GLB,, | Performed by: DERMATOLOGY

## 2023-09-14 PROCEDURE — 99213 OFFICE O/P EST LOW 20 MIN: CPT | Mod: 25,S$GLB,, | Performed by: DERMATOLOGY

## 2023-09-14 PROCEDURE — 1159F PR MEDICATION LIST DOCUMENTED IN MEDICAL RECORD: ICD-10-PCS | Mod: CPTII,S$GLB,, | Performed by: DERMATOLOGY

## 2023-09-14 PROCEDURE — 17003 DESTRUCTION, PREMALIGNANT LESIONS; SECOND THROUGH 14 LESIONS: ICD-10-PCS | Mod: XS,S$GLB,, | Performed by: DERMATOLOGY

## 2023-09-14 PROCEDURE — 1159F MED LIST DOCD IN RCRD: CPT | Mod: CPTII,S$GLB,, | Performed by: DERMATOLOGY

## 2023-09-14 PROCEDURE — 17000 DESTRUCT PREMALG LESION: CPT | Mod: XS,S$GLB,, | Performed by: DERMATOLOGY

## 2023-09-14 PROCEDURE — 1160F RVW MEDS BY RX/DR IN RCRD: CPT | Mod: CPTII,S$GLB,, | Performed by: DERMATOLOGY

## 2023-09-14 PROCEDURE — 17003 DESTRUCT PREMALG LES 2-14: CPT | Mod: XS,S$GLB,, | Performed by: DERMATOLOGY

## 2023-09-14 PROCEDURE — 17110 PR DESTRUCTION BENIGN LESIONS UP TO 14: ICD-10-PCS | Mod: S$GLB,,, | Performed by: DERMATOLOGY

## 2023-09-14 PROCEDURE — 17000 PR DESTRUCTION(LASER SURGERY,CRYOSURGERY,CHEMOSURGERY),PREMALIGNANT LESIONS,FIRST LESION: ICD-10-PCS | Mod: XS,S$GLB,, | Performed by: DERMATOLOGY

## 2023-09-14 PROCEDURE — 3044F HG A1C LEVEL LT 7.0%: CPT | Mod: CPTII,S$GLB,, | Performed by: DERMATOLOGY

## 2023-09-14 PROCEDURE — 3044F PR MOST RECENT HEMOGLOBIN A1C LEVEL <7.0%: ICD-10-PCS | Mod: CPTII,S$GLB,, | Performed by: DERMATOLOGY

## 2023-09-14 NOTE — PROGRESS NOTES
"  Patient Information  Name: Kirti Romero  : 1960  MRN: [id number]     Referring Physician:  No ref. provider found   Primary Care Physician:  Howard Gates MD   Date of Visit: 2023      Subjective:     History of Present lllness:    Kirti Romero is a 62 y.o. female who presents with a chief complaint of moles.   This is a high risk patient with a personal history of nonmelanoma skin cancer who is here today to check for the development of new lesions.  Patient is here today for a "mole" check.     She c/o red flaky skin on upper lip, red spot on L forearm, "spotty" legs, and surgical scars on R foot    Patient was last seen: 2020.  Prior notes by myself reviewed.   Clinical documentation obtained by nursing staff reviewed.    Review of Systems    Objective:   Physical Exam   Constitutional: She appears well-developed and well-nourished. No distress.   HENT:   Mouth/Throat: Lips normal.    Eyes: Lids are normal.  No conjunctival no injection.   Neurological: She is alert and oriented to person, place, and time. She is not disoriented.   Psychiatric: She has a normal mood and affect.   Skin:   Areas Examined (abnormalities noted in diagram):   Scalp / Hair Palpated and Inspected  Head / Face Inspection Performed  Neck Inspection Performed  Chest / Axilla Inspection Performed  Abdomen Inspection Performed  Genitals / Buttocks / Groin Inspection Performed  Back Inspection Performed  RUE Inspected  LUE Inspection Performed  RLE Inspected  LLE Inspection Performed  Nails and Digits Inspection Performed                 Diagram Legend     Erythematous scaling macule/papule c/w actinic keratosis       Vascular papule c/w angioma      Pigmented verrucoid papule/plaque c/w seborrheic keratosis      Yellow umbilicated papule c/w sebaceous hyperplasia      Irregularly shaped tan macule c/w lentigo     1-2 mm smooth white papules consistent with Milia      Movable subcutaneous cyst with " punctum c/w epidermal inclusion cyst      Subcutaneous movable cyst c/w pilar cyst      Firm pink to brown papule c/w dermatofibroma      Pedunculated fleshy papule(s) c/w skin tag(s)      Evenly pigmented macule c/w junctional nevus     Mildly variegated pigmented, slightly irregular-bordered macule c/w mildly atypical nevus      Flesh colored to evenly pigmented papule c/w intradermal nevus       Pink pearly papule/plaque c/w basal cell carcinoma      Erythematous hyperkeratotic cursted plaque c/w SCC      Surgical scar with no sign of skin cancer recurrence      Open and closed comedones      Inflammatory papules and pustules      Verrucoid papule consistent consistent with wart     Erythematous eczematous patches and plaques     Dystrophic onycholytic nail with subungual debris c/w onychomycosis     Umbilicated papule    Erythematous-base heme-crusted tan verrucoid plaque consistent with inflamed seborrheic keratosis     Erythematous Silvery Scaling Plaque c/w Psoriasis     See annotation    No images are attached to the encounter or orders placed in the encounter.      [] Data reviewed  [] Prior external notes reviewed  [] Independent review of test  [] Management discussed with another provider  [] Independent historian    Assessment / Plan:        Actinic keratosis  Cryosurgery procedure note:  Risk, benefits, and alternatives of cryosurgery are discussed with the patient, including but not limited to the risks of hypopigmentation, hyperpigmentation, scar, infection, recurrence of lesion(s), development of new lesion(s), and need for additional treatment of the lesion(s). Verbal consent obtained from patient. Liquid nitrogen cryosurgery applied to 3 lesion(s) to produce a freeze injury. Counseled patient that blisters may form, and instructed patient on wound care with gentle cleansing and use of Vaseline ointment to keep moist until healed. Handout was provided, and patient was instructed to return to clinic  in 1-2 months if lesions do not completely resolve.    Inflamed seborrheic keratosis  Cryosurgery procedure note:  Risk, benefits, and alternatives of cryosurgery are discussed with the patient, including but not limited to the risks of hypopigmentation, hyperpigmentation, scar, infection, recurrence of lesion(s), development of new lesion(s), and need for additional treatment of the lesion(s). Verbal consent obtained from patient. Liquid nitrogen cryosurgery applied to 6 lesion(s) to produce a freeze injury. Counseled patient that blisters may form, and instructed patient on wound care with gentle cleansing and use of Vaseline ointment to keep moist until healed. Handout was provided, and patient was instructed to return to clinic in 1-2 months if lesions do not completely resolve.    Lentigines  These are benign sun spots which should be monitored for changes. Daily sun protection will reduce the number of new lesions.   Recommend using a broad-spectrum, water-resistant sunscreen with SPF of 30 or higher--reapply every 2 hours. Seek shade, wear sun-protective clothing, and perform regular skin self-exams.    Angioma of skin  These are benign vascular lesions that are inherited. Treatment is not necessary.    Seborrheic keratosis  These are benign, inherited growths without a malignant potential. Reassurance given to patient. No treatment is necessary.    History of nonmelanoma skin cancer   - stable and chronic  Area(s) of previous nonmelanoma skin cancer evaluated with no evidence of recurrence. Reassurance provided.  Recommend using a broad-spectrum, water-resistant sunscreen with SPF of 30 or higher--reapply every 2 hours. Seek shade, wear sun-protective clothing, and perform regular skin self-exams.    Skin cancer screening  Total body skin examination performed today as noted in physical exam. No lesions suspicious for malignancy were seen.  Recommend using a broad-spectrum, water-resistant sunscreen with SPF  of 30 or higher--reapply every 2 hours. Seek shade, wear sun-protective clothing, and perform regular skin self-exams.      Follow up in about 1 year (around 9/14/2024) for TBSE, or sooner if any new problems or changing lesions.      Susan Cardoso MD, FAAD  [facility]

## 2023-09-14 NOTE — PATIENT INSTRUCTIONS
Recommend OTC silicone gel sheets, such as ScarAway, or OTC silicone tape, such as CicaTape or Mepitac. Sheets/tape can be cut to size and should be worn for 12-24 hours per day.  Minimum treatment time is 2-3 months. Larger and older scars may take longer, therefore continued use is recommended if improvement is still seen after the initial 3 months.   Scar massage (apply firm pressure and rock finger side-to-side) for 5 minutes 5 times per day can also help.      CRYOSURGERY      Your doctor has used a method called cryosurgery to treat your skin condition. Cryosurgery refers to the use of very cold substances to treat a variety of skin conditions such as warts, precancerous skin lesions, molluscum contagiosum, sun spots, and several benign growths. The substance we use in cryosurgery is liquid nitrogen and is so cold (-195 degrees Celsius) that it burns when administered.     Following treatment in the office, the skin may immediately itch or burn and become red. You may find the area around the lesion is affected as well. It is sometimes necessary to treat not only the lesion, but also a small area of the surrounding normal skin to achieve a good response.     A blister, and even a blood-filled blister, may form after treatment.   This is a normal response. If the blister is painful, it is acceptable to sterilize a needle with rubbing alcohol and gently pop the blister. It is important that you gently wash the area with soap and warm water as the blister fluid may contain wart virus if a wart was treated. Do not remove the roof of the blister.     The area treated can take anywhere from 1-3 weeks to heal. Healing time depends on the kind of skin lesion treated, the location, and how aggressively the lesion was treated. It is recommended that the areas treated are covered with Vaseline and a bandaid to improve healing. If a bandaid is not practical, Vaseline applied several times per day will do. Keeping these  areas moist will speed the healing time.    Treatment with liquid nitrogen can leave a scar. In dark skin, it may be a light or dark scar; in light skin it may be a white or pink scar. These will generally fade with time, but may never go away completely.     If you have any concerns after your treatment, please message us via [facility] or call us at [phone].

## 2023-10-03 ENCOUNTER — OFFICE VISIT (OUTPATIENT)
Dept: PODIATRY | Facility: CLINIC | Age: 63
End: 2023-10-03
Payer: COMMERCIAL

## 2023-10-03 ENCOUNTER — HOSPITAL ENCOUNTER (OUTPATIENT)
Dept: RADIOLOGY | Facility: HOSPITAL | Age: 63
Discharge: HOME | End: 2023-10-03
Attending: PODIATRIST
Payer: COMMERCIAL

## 2023-10-03 VITALS
HEART RATE: 79 BPM | DIASTOLIC BLOOD PRESSURE: 74 MMHG | HEIGHT: 62 IN | SYSTOLIC BLOOD PRESSURE: 126 MMHG | BODY MASS INDEX: 19.84 KG/M2

## 2023-10-03 DIAGNOSIS — Z98.890 POSTOPERATIVE STATE: ICD-10-CM

## 2023-10-03 DIAGNOSIS — M25.674 JOINT STIFFNESS OF TOE OF RIGHT FOOT: Primary | ICD-10-CM

## 2023-10-03 PROCEDURE — 99213 PR OFFICE/OUTPT VISIT, EST, LEVL III, 20-29 MIN: ICD-10-PCS | Mod: S$GLB,,, | Performed by: PODIATRIST

## 2023-10-03 PROCEDURE — 3044F PR MOST RECENT HEMOGLOBIN A1C LEVEL <7.0%: ICD-10-PCS | Mod: CPTII,S$GLB,, | Performed by: PODIATRIST

## 2023-10-03 PROCEDURE — 1159F MED LIST DOCD IN RCRD: CPT | Mod: CPTII,S$GLB,, | Performed by: PODIATRIST

## 2023-10-03 PROCEDURE — 1160F PR REVIEW ALL MEDS BY PRESCRIBER/CLIN PHARMACIST DOCUMENTED: ICD-10-PCS | Mod: CPTII,S$GLB,, | Performed by: PODIATRIST

## 2023-10-03 PROCEDURE — 99999 PR PBB SHADOW E&M-EST. PATIENT-LVL III: ICD-10-PCS | Mod: PBBFAC,,, | Performed by: PODIATRIST

## 2023-10-03 PROCEDURE — 1160F RVW MEDS BY RX/DR IN RCRD: CPT | Mod: CPTII,S$GLB,, | Performed by: PODIATRIST

## 2023-10-03 PROCEDURE — 3008F BODY MASS INDEX DOCD: CPT | Mod: CPTII,S$GLB,, | Performed by: PODIATRIST

## 2023-10-03 PROCEDURE — 73630 X-RAY EXAM OF FOOT: CPT | Mod: TC,FY,RT

## 2023-10-03 PROCEDURE — 3008F PR BODY MASS INDEX (BMI) DOCUMENTED: ICD-10-PCS | Mod: CPTII,S$GLB,, | Performed by: PODIATRIST

## 2023-10-03 PROCEDURE — 3044F HG A1C LEVEL LT 7.0%: CPT | Mod: CPTII,S$GLB,, | Performed by: PODIATRIST

## 2023-10-03 PROCEDURE — 3074F SYST BP LT 130 MM HG: CPT | Mod: CPTII,S$GLB,, | Performed by: PODIATRIST

## 2023-10-03 PROCEDURE — 99999 PR PBB SHADOW E&M-EST. PATIENT-LVL III: CPT | Mod: PBBFAC,,, | Performed by: PODIATRIST

## 2023-10-03 PROCEDURE — 1159F PR MEDICATION LIST DOCUMENTED IN MEDICAL RECORD: ICD-10-PCS | Mod: CPTII,S$GLB,, | Performed by: PODIATRIST

## 2023-10-03 PROCEDURE — 73630 XR FOOT COMPLETE 3 VIEW RIGHT: ICD-10-PCS | Mod: 26,RT,, | Performed by: RADIOLOGY

## 2023-10-03 PROCEDURE — 3078F DIAST BP <80 MM HG: CPT | Mod: CPTII,S$GLB,, | Performed by: PODIATRIST

## 2023-10-03 PROCEDURE — 99213 OFFICE O/P EST LOW 20 MIN: CPT | Mod: S$GLB,,, | Performed by: PODIATRIST

## 2023-10-03 PROCEDURE — 3078F PR MOST RECENT DIASTOLIC BLOOD PRESSURE < 80 MM HG: ICD-10-PCS | Mod: CPTII,S$GLB,, | Performed by: PODIATRIST

## 2023-10-03 PROCEDURE — 3074F PR MOST RECENT SYSTOLIC BLOOD PRESSURE < 130 MM HG: ICD-10-PCS | Mod: CPTII,S$GLB,, | Performed by: PODIATRIST

## 2023-10-03 PROCEDURE — 73630 X-RAY EXAM OF FOOT: CPT | Mod: 26,RT,, | Performed by: RADIOLOGY

## 2023-10-03 NOTE — PROGRESS NOTES
Subjective:      Patient ID: Kirti Romero is a 62 y.o. female.    Chief Complaint: Post-op Evaluation (Right ft., xray done) and Foot Problem (Pain and tenderness in the great toe)    Presents today complaining of progressive bunions on both feet.  Relates that she uses toe separators and has been wearing shoes with more room in the toe box to help accommodate her feet.  No pain reported.    02/27/2023:  Returns following completing her x-ray of both feet for surgical consultation.  No new complaints.    04/27/2023:  Post Lapidus bunionectomy with Taran osteotomy, flexor tenotomy 4th toe and tailor's bunionectomy 5th metatarsal with flexor tenotomy 5th toe right foot on 04/21/2023.  Doing well.  No significant pain reported.  States that the dressing did caused some irritation along the front of the ankle.  Dressing has remained intact.  Nonweightbearing to the right lower extremity with Cam boot and rolling knee scooter.  Accompanied by her .    05/11/2023: 3 weeks postop.   No pain reported.  She is attempted to place some weight down however has some anxiety.  She is using a rolling knee scooter.    06/13/2023:  7 weeks postop.  No pain reported.  Patient is permitted to be weight-bearing with Cam boot and walker however presents for rolling knee scooter today.  Accompanied by her .    07/20/2023:  Nearly 3 months postop.  Reports that she is attending physical therapy and is going very well.  She reports intermittent discomfort pointing along the medial arch of the left foot.  No significant pain with standing and walking.  Utilizing tennis shoes as advised.    10/03/2023:  Nearly 6 months postop reporting that her right great toe lacks purchased down words when she standing and walking.  She is been performing stretching exercises.  She also complains of intermittent pain along the arch of the right foot which is alleviated by wearing good supportive tennis shoes.    Vitals:    10/03/23 1315  "  BP: 126/74   Pulse: 79   Height: 5' 2" (1.575 m)   PainSc:   4   PainLoc: Toe      Past Medical History:   Diagnosis Date    Abnormal Pap smear     Treated with Hysterectomy    Basal cell carcinoma     Breast cyst 03/01/2016    rt breast    Breast disorder        Past Surgical History:   Procedure Laterality Date    AUGMENTATION OF BREAST Bilateral 2003    BREAST BIOPSY Right 03/01/2016    cyst that popped    BREAST SURGERY Bilateral 06/01/2003    COLONOSCOPY N/A 9/22/2020    Procedure: COLONOSCOPY;  Surgeon: Alberto Santos MD;  Location: [facility] ENDO (4TH FLR);  Service: Endoscopy;  Laterality: N/A;  COVID test on 9/26/20 at [facility] [place] prep    HYSTERECTOMY  09/1998    full 38    LAPIDUS BUNIONECTOMY Right 4/21/2023    Procedure: BUNIONECTOMY, LAPIDUS;  Surgeon: Arthur Alcocer DPM;  Location: [facility];  Service: Podiatry;  Laterality: Right;  mini c-arm, Lapiplasty set([place]) notified cc  bunionectomy samantha notified cc    OOPHORECTOMY  1998    MARGARET BUNIONECTOMY Right 4/21/2023    Procedure: EXCISION, BUNIONETTE;  Surgeon: Arthur Alcocer DPM;  Location: [facility] OR;  Service: Podiatry;  Laterality: Right;  5th toe    MARGARET BUNIONECTOMY Right 4/21/2023    Procedure: EXCISION, BUNIONETTE;  Surgeon: Arthur Alcocer DPM;  Location: [facility] OR;  Service: Podiatry;  Laterality: Right;  Arthrex screws    TENOTOMY Right 4/21/2023    Procedure: TENOTOMY;  Surgeon: Arthur Alcocer DPM;  Location: [facility] OR;  Service: Podiatry;  Laterality: Right;  4th toe    TONSILLECTOMY         Family History   Problem Relation Age of Onset    Diabetes Mother     Stroke Paternal Grandfather     Breast cancer Paternal Grandmother     Amblyopia Neg Hx     Blindness Neg Hx     Cancer Neg Hx     Cataracts Neg Hx     Glaucoma Neg Hx     Hypertension Neg Hx     Macular degeneration Neg Hx     Retinal detachment Neg Hx     Strabismus Neg Hx     Thyroid disease Neg Hx     Melanoma Neg Hx        Social History "     Socioeconomic History    Marital status:    Tobacco Use    Smoking status: Never    Smokeless tobacco: Never   Substance and Sexual Activity    Alcohol use: No     Comment: very rarely    Drug use: No    Sexual activity: Yes     Partners: Male     Comment:      Social Determinants of Health     Financial Resource Strain: Low Risk  (9/6/2023)    Overall Financial Resource Strain (CARDIA)     Difficulty of Paying Living Expenses: Not hard at all   Food Insecurity: No Food Insecurity (9/6/2023)    Hunger Vital Sign     Worried About Running Out of Food in the Last Year: Never true     Ran Out of Food in the Last Year: Never true   Transportation Needs: No Transportation Needs (9/6/2023)    PRAPARE - Transportation     Lack of Transportation (Medical): No     Lack of Transportation (Non-Medical): No   Physical Activity: Sufficiently Active (9/6/2023)    Exercise Vital Sign     Days of Exercise per Week: 5 days     Minutes of Exercise per Session: 30 min   Stress: No Stress Concern Present (9/6/2023)    [employer] [place] [employer] - Occupational Stress Questionnaire     Feeling of Stress : Only a little   Social Connections: Unknown (9/6/2023)    Social Connection and Isolation Panel [NHANES]     Frequency of Communication with Friends and Family: More than three times a week     Frequency of Social Gatherings with Friends and Family: Twice a week     Active Member of Clubs or Organizations: Yes     Attends Club or Organization Meetings: More than 4 times per year     Marital Status:    Housing Stability: Low Risk  (9/6/2023)    Housing Stability Vital Sign     Unable to Pay for Housing in the Last Year: No     Number of Places Lived in the Last Year: 1     Unstable Housing in the Last Year: No       Current Outpatient Medications   Medication Sig Dispense Refill    CA CITRATE/MGOX/VIT D3/B6/MIN (CALCIUM CITRATE + D WITH MAG ORAL) Take by mouth.      cyanocobalamin (VITAMIN B-12)  1000 MCG tablet Take 100 mcg by mouth once daily.      estradioL (ESTRACE) 1 MG tablet Take 1 tablet (1 mg total) by mouth once daily. 90 tablet 3    flu vacc [id number] 6mos up,PF, (FLUARIX QUAD 6551-3668, PF,) 60 mcg (15 mcg x 4)/0.5 mL Syrg Inject 0.5 mLs into the muscle once. for 1 dose 0.5 mL 0    hydrOXYzine HCL (ATARAX) 25 MG tablet 1/2 to 1 nightly prn. 30 tablet 3    levothyroxine (SYNTHROID) 75 MCG tablet Take 1 tablet (75 mcg total) by mouth once daily. 90 tablet 3    vitamin D (VITAMIN D3) 1000 units Tab Take 1,000 Units by mouth once daily.       Current Facility-Administered Medications   Medication Dose Route Frequency Provider Last Rate Last Admin    sodium chloride 0.9% flush 10 mL  10 mL Intravenous PRN Arthur Alcocer, SHANAM           Review of patient's allergies indicates:  No Known Allergies      Review of Systems   Constitutional: Negative for chills, fever and malaise/fatigue.   HENT:  Negative for congestion and hearing loss.    Cardiovascular:  Negative for chest pain, claudication and leg swelling.   Respiratory:  Negative for cough and shortness of breath.    Musculoskeletal:  Negative for back pain, joint pain, muscle cramps and muscle weakness.   Gastrointestinal:  Negative for nausea and vomiting.   Neurological:  Negative for numbness, paresthesias and weakness.   Psychiatric/Behavioral:  Negative for altered mental status.            Objective:      Physical Exam  Constitutional:       General: She is not in acute distress.     Appearance: Normal appearance. She is not ill-appearing.   Cardiovascular:      Pulses:           Dorsalis pedis pulses are 2+ on the right side and 2+ on the left side.        Posterior tibial pulses are 2+ on the right side and 2+ on the left side.      Comments: No lower extremity edema bilateral.  Skin temp is warm bilateral foot.  No rubor on dependency bilateral foot.  Musculoskeletal:      Comments: Rectus alignment to the right hallux, 4th and 5th  toes right foot.  Previous tailor's bunion is resolved.    No significant pain on palpation to surgical sites right foot.  1 MTP range motion with 45 degrees of dorsiflexion and 10° of plantar flexion without pain which is completed passively.  No audible crepitus with 1 MTP range motion.    Right ankle range motion with near 10° of dorsiflexion with the knee extended.    Semi rigid mild cavus foot structure bilateral foot.  Ankle range of motion appears to be within normal limits.  Subtalar joint range motion somewhat limited.     Skin:     General: Skin is warm.      Capillary Refill: Capillary refill takes less than 2 seconds.      Findings: No ecchymosis or erythema.      Nails: There is no clubbing.      Comments: Normal-appearing scars right foot.  Note small papular lesion at the proximal aspect of the scar overlying the dorsal distal 5th metatarsal shaft/neck which could be the result of a superficial underlying dissolvable suture.   Neurological:      Mental Status: She is alert and oriented to person, place, and time.      Sensory: Sensation is intact.      Motor: Motor function is intact.               Assessment:       Encounter Diagnosis   Name Primary?    Joint stiffness of toe of right foot Yes         Plan:       Kirti Nagel was seen today for post-op evaluation and foot problem.    Diagnoses and all orders for this visit:    Joint stiffness of toe of right foot      I counseled the patient on her conditions, their implications and medical management.    Reviewed right foot x-ray noting fully consolidated 1st metatarsocuneiform joint arthrodesis with intact fixation and and also fully consolidated Akin osteotomy and 5th metatarsal osteotomy sites.  There is some arthritic changes to the 1 MTP with enlargement of the sesamoids that is unchanged compared to previous x-rays.  There is some diffuse disuse osteopenia to the right foot that is improving compared to prior x-rays.  Overall maintained  alignment of the 1st and 5th rays right foot.  Rectus alignment of the 1 MTP right foot.      Demonstrated proper passive stretching especially plantar flexing the right hallux with pressure along the dorsal proximal phalanx assisted by someone else up to 30 repetitions twice daily.  In addition we discussed stretching the calf muscle.  We discussed gradually increasing her activity as tolerated.    RTC p.r.n. as discussed.     Assisted per Kalin Soto DPM PGY 3    A portion of this note was generated by voice recognition software and may contain spelling and grammar errors.      .

## 2023-10-18 ENCOUNTER — HOSPITAL ENCOUNTER (OUTPATIENT)
Dept: RADIOLOGY | Facility: HOSPITAL | Age: 63
Discharge: HOME | End: 2023-10-18
Attending: INTERNAL MEDICINE
Payer: COMMERCIAL

## 2023-10-18 DIAGNOSIS — Z12.31 ENCOUNTER FOR SCREENING MAMMOGRAM FOR MALIGNANT NEOPLASM OF BREAST: ICD-10-CM

## 2023-10-18 PROCEDURE — 77067 MAMMO DIGITAL SCREENING BILAT WITH TOMO: ICD-10-PCS | Mod: 26,,, | Performed by: RADIOLOGY

## 2023-10-18 PROCEDURE — 77063 MAMMO DIGITAL SCREENING BILAT WITH TOMO: ICD-10-PCS | Mod: 26,,, | Performed by: RADIOLOGY

## 2023-10-18 PROCEDURE — 77067 SCR MAMMO BI INCL CAD: CPT | Mod: 26,,, | Performed by: RADIOLOGY

## 2023-10-18 PROCEDURE — 77063 BREAST TOMOSYNTHESIS BI: CPT | Mod: 26,,, | Performed by: RADIOLOGY

## 2023-10-18 PROCEDURE — 77067 SCR MAMMO BI INCL CAD: CPT | Mod: TC

## 2023-12-07 ENCOUNTER — TELEPHONE (OUTPATIENT)
Dept: OBSTETRICS AND GYNECOLOGY | Facility: CLINIC | Age: 63
End: 2023-12-07
Payer: COMMERCIAL

## 2023-12-07 NOTE — TELEPHONE ENCOUNTER
Lvm to patient to reschedule her 12/21 appt for an earlier time that day or to reschedule for another day.

## 2023-12-08 ENCOUNTER — TELEPHONE (OUTPATIENT)
Dept: OBSTETRICS AND GYNECOLOGY | Facility: CLINIC | Age: 63
End: 2023-12-08
Payer: COMMERCIAL

## 2023-12-21 ENCOUNTER — TELEPHONE (OUTPATIENT)
Dept: OBSTETRICS AND GYNECOLOGY | Facility: CLINIC | Age: 63
End: 2023-12-21
Payer: COMMERCIAL

## 2023-12-21 ENCOUNTER — OFFICE VISIT (OUTPATIENT)
Dept: OBSTETRICS AND GYNECOLOGY | Facility: CLINIC | Age: 63
End: 2023-12-21
Payer: COMMERCIAL

## 2023-12-21 VITALS
DIASTOLIC BLOOD PRESSURE: 76 MMHG | HEART RATE: 76 BPM | SYSTOLIC BLOOD PRESSURE: 116 MMHG | BODY MASS INDEX: 19.52 KG/M2 | WEIGHT: 106.69 LBS

## 2023-12-21 DIAGNOSIS — Z01.419 WELL WOMAN EXAM WITH ROUTINE GYNECOLOGICAL EXAM: Primary | ICD-10-CM

## 2023-12-21 PROCEDURE — 3074F PR MOST RECENT SYSTOLIC BLOOD PRESSURE < 130 MM HG: ICD-10-PCS | Mod: CPTII,S$GLB,, | Performed by: OBSTETRICS & GYNECOLOGY

## 2023-12-21 PROCEDURE — 1160F PR REVIEW ALL MEDS BY PRESCRIBER/CLIN PHARMACIST DOCUMENTED: ICD-10-PCS | Mod: CPTII,S$GLB,, | Performed by: OBSTETRICS & GYNECOLOGY

## 2023-12-21 PROCEDURE — 1159F MED LIST DOCD IN RCRD: CPT | Mod: CPTII,S$GLB,, | Performed by: OBSTETRICS & GYNECOLOGY

## 2023-12-21 PROCEDURE — 99396 PR PREVENTIVE VISIT,EST,40-64: ICD-10-PCS | Mod: S$GLB,,, | Performed by: OBSTETRICS & GYNECOLOGY

## 2023-12-21 PROCEDURE — 88175 CYTOPATH C/V AUTO FLUID REDO: CPT | Performed by: OBSTETRICS & GYNECOLOGY

## 2023-12-21 PROCEDURE — 3008F PR BODY MASS INDEX (BMI) DOCUMENTED: ICD-10-PCS | Mod: CPTII,S$GLB,, | Performed by: OBSTETRICS & GYNECOLOGY

## 2023-12-21 PROCEDURE — 3044F HG A1C LEVEL LT 7.0%: CPT | Mod: CPTII,S$GLB,, | Performed by: OBSTETRICS & GYNECOLOGY

## 2023-12-21 PROCEDURE — 1160F RVW MEDS BY RX/DR IN RCRD: CPT | Mod: CPTII,S$GLB,, | Performed by: OBSTETRICS & GYNECOLOGY

## 2023-12-21 PROCEDURE — 3078F PR MOST RECENT DIASTOLIC BLOOD PRESSURE < 80 MM HG: ICD-10-PCS | Mod: CPTII,S$GLB,, | Performed by: OBSTETRICS & GYNECOLOGY

## 2023-12-21 PROCEDURE — 1159F PR MEDICATION LIST DOCUMENTED IN MEDICAL RECORD: ICD-10-PCS | Mod: CPTII,S$GLB,, | Performed by: OBSTETRICS & GYNECOLOGY

## 2023-12-21 PROCEDURE — 3074F SYST BP LT 130 MM HG: CPT | Mod: CPTII,S$GLB,, | Performed by: OBSTETRICS & GYNECOLOGY

## 2023-12-21 PROCEDURE — 3078F DIAST BP <80 MM HG: CPT | Mod: CPTII,S$GLB,, | Performed by: OBSTETRICS & GYNECOLOGY

## 2023-12-21 PROCEDURE — 99999 PR PBB SHADOW E&M-EST. PATIENT-LVL III: ICD-10-PCS | Mod: PBBFAC,,, | Performed by: OBSTETRICS & GYNECOLOGY

## 2023-12-21 PROCEDURE — 99999 PR PBB SHADOW E&M-EST. PATIENT-LVL III: CPT | Mod: PBBFAC,,, | Performed by: OBSTETRICS & GYNECOLOGY

## 2023-12-21 PROCEDURE — 99396 PREV VISIT EST AGE 40-64: CPT | Mod: S$GLB,,, | Performed by: OBSTETRICS & GYNECOLOGY

## 2023-12-21 PROCEDURE — 3008F BODY MASS INDEX DOCD: CPT | Mod: CPTII,S$GLB,, | Performed by: OBSTETRICS & GYNECOLOGY

## 2023-12-21 PROCEDURE — 3044F PR MOST RECENT HEMOGLOBIN A1C LEVEL <7.0%: ICD-10-PCS | Mod: CPTII,S$GLB,, | Performed by: OBSTETRICS & GYNECOLOGY

## 2023-12-21 NOTE — PROGRESS NOTES
HPI:  63 y.o.   OB History          4    Para   4    Term   2            AB        Living   2         SAB        IAB        Ectopic        Multiple        Live Births   2              Patient's last menstrual period was 1998.   Patient here for her annual gynecologic exam.  She has no complaints at this time.    ROS:  GENERAL: No fever, chills, fatigability or weight loss.  SKIN: No rashes, itching or changes in color or texture of skin.  HEAD: No headaches or recent head trauma.  EYES: Visual acuity fine. No photophobia, ocular pain or diplopia.  EARS: Denies ear pain, discharge or vertigo.  NOSE: No loss of smell, no epistaxis or postnasal drip.  MOUTH & THROAT: No hoarseness or change in voice. No excessive gum bleeding.  NODES: Denies swollen glands.  CHEST: Denies YOUNG, cyanosis, wheezing, cough and sputum production.  CARDIOVASCULAR: Denies chest pain, PND, orthopnea or reduced exercise tolerance.  ABDOMEN: Appetite fine. No weight loss. Denies diarrhea, abdominal pain, hematemesis or blood in stool.  URINARY: No flank pain, dysuria or hematuria.  PERIPHERAL VASCULAR: No claudication or cyanosis.  MUSCULOSKELETAL: No joint stiffness or swelling. Denies back pain.  NEUROLOGIC: No history of seizures, paralysis, alteration of gait or coordination.    PE:   /76   Pulse 76   Wt 48.4 kg (106 lb 11.2 oz)   LMP 1998   BMI 19.52 kg/m²   APPEARANCE: Well nourished, well developed, in no acute distress.  NECK: Neck symmetric without masses or thyromegaly.  NODES: No inguinal lymph node enlargement.  ABDOMEN: Soft. No tenderness or masses. No hepatosplenomegaly. No hernias.  BREASTS: Symmetrical, no skin changes or visible lesions. No palpable masses, nipple discharge or adenopathy bilaterally.  PELVIC: Normal external female genitalia without lesions. Normal hair distribution. Adequate perineal body, normal urethral meatus. Vagina moist and well rugated without lesions or discharge.  No significant cystocele or rectocele. Uterus and cervix surgically absent. Bimanual exam revealed no masses, tenderness or abnormality.    Procedure:  Pap Smear    Assessment:  Normal Gynecologic Exam    Plan:  Mammogram and Colonoscopy as per current recommendations.   Return to clinic in one year or for any problems or complaints.  No gyn co  On hrt, estrogen 1mg, cut half, gets from other md

## 2024-06-10 ENCOUNTER — PATIENT MESSAGE (OUTPATIENT)
Dept: INTERNAL MEDICINE | Facility: CLINIC | Age: 64
End: 2024-06-10
Payer: COMMERCIAL

## 2024-08-20 ENCOUNTER — PATIENT MESSAGE (OUTPATIENT)
Dept: ADMINISTRATIVE | Facility: HOSPITAL | Age: 64
End: 2024-08-20
Payer: COMMERCIAL

## 2024-08-27 ENCOUNTER — PATIENT MESSAGE (OUTPATIENT)
Dept: OBSTETRICS AND GYNECOLOGY | Facility: CLINIC | Age: 64
End: 2024-08-27
Payer: COMMERCIAL

## 2024-08-27 DIAGNOSIS — Z12.31 VISIT FOR SCREENING MAMMOGRAM: Primary | ICD-10-CM

## 2024-08-30 ENCOUNTER — PATIENT MESSAGE (OUTPATIENT)
Dept: INTERNAL MEDICINE | Facility: CLINIC | Age: 64
End: 2024-08-30
Payer: COMMERCIAL

## 2024-09-10 ENCOUNTER — PATIENT MESSAGE (OUTPATIENT)
Dept: INTERNAL MEDICINE | Facility: CLINIC | Age: 64
End: 2024-09-10
Payer: COMMERCIAL

## 2024-09-10 ENCOUNTER — TELEPHONE (OUTPATIENT)
Dept: INTERNAL MEDICINE | Facility: CLINIC | Age: 64
End: 2024-09-10
Payer: COMMERCIAL

## 2024-09-10 DIAGNOSIS — Z00.00 ROUTINE PHYSICAL EXAMINATION: Primary | ICD-10-CM

## 2024-09-10 DIAGNOSIS — E03.9 HYPOTHYROIDISM, UNSPECIFIED TYPE: ICD-10-CM

## 2024-09-10 DIAGNOSIS — E55.9 VITAMIN D DEFICIENCY: ICD-10-CM

## 2024-09-10 DIAGNOSIS — E53.8 B12 DEFICIENCY: ICD-10-CM

## 2024-09-10 NOTE — TELEPHONE ENCOUNTER
----- Message from Daniela Baker sent at 9/10/2024  8:33 AM CDT -----  Contact: Self [phone]  Patient is returning a phone call.    Who left a message for the patient: nurse     Does patient know what this is regarding:  labs    Would you like a call back, or a response through your [facility] portal?:   call back    Comments: Calling to provider [facility] work which is [phone]. Pt is also at location right now

## 2024-09-11 LAB
25(OH)D3+25(OH)D2 SERPL-MCNC: 61 NG/ML (ref 30–100)
ALBUMIN SERPL-MCNC: 4.5 G/DL (ref 3.6–5.1)
ALBUMIN/GLOB SERPL: 2.3 (CALC) (ref 1–2.5)
ALP SERPL-CCNC: 77 U/L (ref 37–153)
ALT SERPL-CCNC: 16 U/L (ref 6–29)
AST SERPL-CCNC: 19 U/L (ref 10–35)
BASOPHILS # BLD AUTO: 50 CELLS/UL (ref 0–200)
BASOPHILS NFR BLD AUTO: 0.8 %
BILIRUB SERPL-MCNC: 0.6 MG/DL (ref 0.2–1.2)
BUN SERPL-MCNC: 15 MG/DL (ref 7–25)
BUN/CREAT SERPL: NORMAL (CALC) (ref 6–22)
CALCIUM SERPL-MCNC: 9.2 MG/DL (ref 8.6–10.4)
CHLORIDE SERPL-SCNC: 103 MMOL/L (ref 98–110)
CHOLEST SERPL-MCNC: 237 MG/DL
CHOLEST/HDLC SERPL: 3.1 (CALC)
CO2 SERPL-SCNC: 30 MMOL/L (ref 20–32)
CREAT SERPL-MCNC: 0.71 MG/DL (ref 0.5–1.05)
EGFR: 95 ML/MIN/1.73M2
EOSINOPHIL # BLD AUTO: 130 CELLS/UL (ref 15–500)
EOSINOPHIL NFR BLD AUTO: 2.1 %
ERYTHROCYTE [DISTWIDTH] IN BLOOD BY AUTOMATED COUNT: 12.3 % (ref 11–15)
GLOBULIN SER CALC-MCNC: 2 G/DL (CALC) (ref 1.9–3.7)
GLUCOSE SERPL-MCNC: 78 MG/DL (ref 65–99)
HBA1C MFR BLD: 5.4 % OF TOTAL HGB
HCT VFR BLD AUTO: 40.9 % (ref 35–45)
HDLC SERPL-MCNC: 77 MG/DL
HGB BLD-MCNC: 13.1 G/DL (ref 11.7–15.5)
LDLC SERPL CALC-MCNC: 139 MG/DL (CALC)
LYMPHOCYTES # BLD AUTO: 2220 CELLS/UL (ref 850–3900)
LYMPHOCYTES NFR BLD AUTO: 35.8 %
MCH RBC QN AUTO: 31.6 PG (ref 27–33)
MCHC RBC AUTO-ENTMCNC: 32 G/DL (ref 32–36)
MCV RBC AUTO: 98.8 FL (ref 80–100)
MONOCYTES # BLD AUTO: 502 CELLS/UL (ref 200–950)
MONOCYTES NFR BLD AUTO: 8.1 %
NEUTROPHILS # BLD AUTO: 3298 CELLS/UL (ref 1500–7800)
NEUTROPHILS NFR BLD AUTO: 53.2 %
NONHDLC SERPL-MCNC: 160 MG/DL (CALC)
PLATELET # BLD AUTO: 250 THOUSAND/UL (ref 140–400)
PMV BLD REES-ECKER: 11.9 FL (ref 7.5–12.5)
POTASSIUM SERPL-SCNC: 3.9 MMOL/L (ref 3.5–5.3)
PROT SERPL-MCNC: 6.5 G/DL (ref 6.1–8.1)
RBC # BLD AUTO: 4.14 MILLION/UL (ref 3.8–5.1)
SODIUM SERPL-SCNC: 142 MMOL/L (ref 135–146)
TRIGL SERPL-MCNC: 100 MG/DL
TSH SERPL-ACNC: 0.71 MIU/L (ref 0.4–4.5)
VIT B12 SERPL-MCNC: 621 PG/ML (ref 200–1100)
WBC # BLD AUTO: 6.2 THOUSAND/UL (ref 3.8–10.8)

## 2024-09-17 ENCOUNTER — IMMUNIZATION (OUTPATIENT)
Dept: INTERNAL MEDICINE | Facility: CLINIC | Age: 64
End: 2024-09-17
Payer: COMMERCIAL

## 2024-09-17 ENCOUNTER — OFFICE VISIT (OUTPATIENT)
Dept: INTERNAL MEDICINE | Facility: CLINIC | Age: 64
End: 2024-09-17
Payer: COMMERCIAL

## 2024-09-17 VITALS
HEIGHT: 62 IN | OXYGEN SATURATION: 100 % | BODY MASS INDEX: 19.52 KG/M2 | SYSTOLIC BLOOD PRESSURE: 114 MMHG | HEART RATE: 81 BPM | WEIGHT: 106.06 LBS | DIASTOLIC BLOOD PRESSURE: 74 MMHG

## 2024-09-17 DIAGNOSIS — D36.10 NEUROMA: ICD-10-CM

## 2024-09-17 DIAGNOSIS — Z00.00 ROUTINE PHYSICAL EXAMINATION: Primary | ICD-10-CM

## 2024-09-17 DIAGNOSIS — E03.9 HYPOTHYROIDISM, UNSPECIFIED TYPE: ICD-10-CM

## 2024-09-17 DIAGNOSIS — Z23 NEED FOR VACCINATION: Primary | ICD-10-CM

## 2024-09-17 DIAGNOSIS — H93.19 TINNITUS, UNSPECIFIED LATERALITY: ICD-10-CM

## 2024-09-17 PROCEDURE — 3078F DIAST BP <80 MM HG: CPT | Mod: CPTII,S$GLB,, | Performed by: INTERNAL MEDICINE

## 2024-09-17 PROCEDURE — 1160F RVW MEDS BY RX/DR IN RCRD: CPT | Mod: CPTII,S$GLB,, | Performed by: INTERNAL MEDICINE

## 2024-09-17 PROCEDURE — 90656 IIV3 VACC NO PRSV 0.5 ML IM: CPT | Mod: S$GLB,,, | Performed by: INTERNAL MEDICINE

## 2024-09-17 PROCEDURE — 3008F BODY MASS INDEX DOCD: CPT | Mod: CPTII,S$GLB,, | Performed by: INTERNAL MEDICINE

## 2024-09-17 PROCEDURE — 99396 PREV VISIT EST AGE 40-64: CPT | Mod: S$GLB,,, | Performed by: INTERNAL MEDICINE

## 2024-09-17 PROCEDURE — 3074F SYST BP LT 130 MM HG: CPT | Mod: CPTII,S$GLB,, | Performed by: INTERNAL MEDICINE

## 2024-09-17 PROCEDURE — 99999 PR PBB SHADOW E&M-EST. PATIENT-LVL IV: CPT | Mod: PBBFAC,,, | Performed by: INTERNAL MEDICINE

## 2024-09-17 PROCEDURE — 90471 IMMUNIZATION ADMIN: CPT | Mod: S$GLB,,, | Performed by: INTERNAL MEDICINE

## 2024-09-17 PROCEDURE — 3044F HG A1C LEVEL LT 7.0%: CPT | Mod: CPTII,S$GLB,, | Performed by: INTERNAL MEDICINE

## 2024-09-17 PROCEDURE — 1159F MED LIST DOCD IN RCRD: CPT | Mod: CPTII,S$GLB,, | Performed by: INTERNAL MEDICINE

## 2024-09-17 RX ORDER — LEVOTHYROXINE SODIUM 75 UG/1
75 TABLET ORAL DAILY
Qty: 90 TABLET | Refills: 3 | Status: SHIPPED | OUTPATIENT
Start: 2024-09-17

## 2024-09-17 RX ORDER — HYDROXYZINE HYDROCHLORIDE 25 MG/1
TABLET, FILM COATED ORAL
Qty: 30 TABLET | Refills: 3 | Status: SHIPPED | OUTPATIENT
Start: 2024-09-17

## 2024-09-17 RX ORDER — ESTRADIOL 1 MG/1
1 TABLET ORAL DAILY
Qty: 90 TABLET | Refills: 3 | Status: SHIPPED | OUTPATIENT
Start: 2024-09-17

## 2024-09-17 NOTE — PROGRESS NOTES
Subjective:       Patient ID: Kirti Romero is a 63 y.o. female.    Chief Complaint: Annual Exam    Long-time patient, 63-year-old female here for annual exam.  Generally doing well although has been under some stress lately.  Her  has had some health issues and was sick twice.  She ended up going to [place] and watched her young grandkids for awhile.  She said that was somewhat stressful both physically and at times med totally although it went well.  She had pre visit labs which we reviewed in detail.  A1c was normal we go up slightly.  He is a little lower but within normal.  Total cholesterol went up and HDL went down.  She admits she has not been as physically active and has been eating more candy.  She suspects that maybe the cause of her increased labs.  I think adjusting her diet would be helpful, trying to exercise a little more and monitoring her thyroid would be warranted.  Other than infrequent left foot neuroma irritation, she is feeling okay.  Up-to-date with mammogram screening      Review of Systems   Constitutional:  Negative for activity change and unexpected weight change.   HENT:  Negative for hearing loss, rhinorrhea and trouble swallowing.    Eyes:  Negative for discharge and visual disturbance.   Respiratory:  Negative for chest tightness and wheezing.    Cardiovascular:  Negative for chest pain and palpitations.   Gastrointestinal:  Negative for blood in stool, constipation, diarrhea and vomiting.   Endocrine: Negative for polydipsia and polyuria.   Genitourinary:  Negative for difficulty urinating, dysuria, hematuria and menstrual problem.   Musculoskeletal:  Positive for arthralgias (Infrequent dorsal left foot pain). Negative for joint swelling and neck pain.   Neurological:  Negative for weakness and headaches.   Psychiatric/Behavioral:  Negative for confusion and dysphoric mood (stable on medication). The patient is not nervous/anxious (stable on medication).            Past  Medical History:   Diagnosis Date    Abnormal Pap smear     Treated with Hysterectomy    Basal cell carcinoma     Breast cyst 03/01/2016    rt breast    Breast disorder      Past Surgical History:   Procedure Laterality Date    AUGMENTATION OF BREAST Bilateral 2003    BREAST BIOPSY Right 03/01/2016    cyst that popped    BREAST SURGERY Bilateral 06/01/2003    COLONOSCOPY N/A 9/22/2020    Procedure: COLONOSCOPY;  Surgeon: Alberto Santos MD;  Location: [address]);  Service: Endoscopy;  Laterality: N/A;  COVID test on 9/26/20 at [facility] [place] prep    HYSTERECTOMY  09/1998    full 38    LAPIDUS BUNIONECTOMY Right 4/21/2023    Procedure: BUNIONECTOMY, LAPIDUS;  Surgeon: Arthur Alcocer DPM;  Location: [facility];  Service: Podiatry;  Laterality: Right;  mini c-arm, Lapiplasty set([place]) notified cc  bunionectomy samantha notified cc    OOPHORECTOMY  1998    MARGARET BUNIONECTOMY Right 4/21/2023    Procedure: EXCISION, BUNIONETTE;  Surgeon: Arthur Alcocer DPM;  Location: [facility] OR;  Service: Podiatry;  Laterality: Right;  5th toe    MARGARET BUNIONECTOMY Right 4/21/2023    Procedure: EXCISION, BUNIONETTE;  Surgeon: Arthur Alcocer DPM;  Location: [facility] OR;  Service: Podiatry;  Laterality: Right;  Arthrex screws    TENOTOMY Right 4/21/2023    Procedure: TENOTOMY;  Surgeon: Arthur Alcocer DPM;  Location: [facility];  Service: Podiatry;  Laterality: Right;  4th toe    TONSILLECTOMY        Patient Active Problem List   Diagnosis    Hypothyroidism    Hallux abductovalgus, right    Mallet toe, right    Tailor's bunion of right foot    Difficulty walking        Objective:      Physical Exam  Constitutional:       General: She is not in acute distress.     Appearance: She is well-developed.   HENT:      Head: Normocephalic and atraumatic.      Right Ear: Tympanic membrane, ear canal and external ear normal.      Left Ear: Tympanic membrane, ear canal and external ear normal.      Mouth/Throat:       "Pharynx: No oropharyngeal exudate or posterior oropharyngeal erythema.   Eyes:      General: No scleral icterus.     Conjunctiva/sclera: Conjunctivae normal.      Pupils: Pupils are equal, round, and reactive to light.   Neck:      Thyroid: No thyromegaly.   Cardiovascular:      Rate and Rhythm: Normal rate and regular rhythm.      Pulses: Normal pulses.      Heart sounds: No murmur heard.  Pulmonary:      Effort: Pulmonary effort is normal.      Breath sounds: Normal breath sounds. No wheezing.   Abdominal:      General: Bowel sounds are normal. There is no distension.      Palpations: Abdomen is soft.      Tenderness: There is no abdominal tenderness.   Musculoskeletal:         General: No tenderness.      Cervical back: Normal range of motion and neck supple.      Right lower leg: No edema.      Left lower leg: No edema.      Comments: No reproducible tenderness on the foot   Lymphadenopathy:      Cervical: No cervical adenopathy.   Skin:     Coloration: Skin is not jaundiced.      Findings: No rash.   Neurological:      General: No focal deficit present.      Mental Status: She is alert and oriented to person, place, and time.   Psychiatric:         Mood and Affect: Mood normal.         Behavior: Behavior normal.         Assessment:       Problem List Items Addressed This Visit          Endocrine    Hypothyroidism     Other Visit Diagnoses       Routine physical examination    -  Primary    Tinnitus, unspecified laterality        Neuroma                Plan:         Kirti Newman" was seen today for annual exam.    Diagnoses and all orders for this visit:    Routine physical examination    Hypothyroidism, unspecified type    Tinnitus, unspecified laterality    Neuroma    Other orders  -     estradioL (ESTRACE) 1 MG tablet; Take 1 tablet (1 mg total) by mouth once daily.  -     hydrOXYzine HCL (ATARAX) 25 MG tablet; 1/2 to 1 nightly prn.  -     levothyroxine (SYNTHROID) 75 MCG tablet; Take 1 tablet (75 mcg " "total) by mouth once daily.           Labs reviewed in detail.  Continue prescriptions.  Follow-up annually.  Call for any changes or problems.          Portions of this note may have been created with voice recognition software. Occasional "wrong-word" or "sound-a-like" substitutions may have occurred due to the inherent limitations of voice recognition software. Please, read the note carefully and recognize, using context, where substitutions have occurred.  "

## 2024-10-21 ENCOUNTER — HOSPITAL ENCOUNTER (OUTPATIENT)
Dept: RADIOLOGY | Facility: HOSPITAL | Age: 64
Discharge: HOME | End: 2024-10-21
Attending: OBSTETRICS & GYNECOLOGY
Payer: COMMERCIAL

## 2024-10-21 DIAGNOSIS — Z12.31 VISIT FOR SCREENING MAMMOGRAM: ICD-10-CM

## 2024-10-21 PROCEDURE — 77067 SCR MAMMO BI INCL CAD: CPT | Mod: TC

## 2024-10-21 PROCEDURE — 77063 BREAST TOMOSYNTHESIS BI: CPT | Mod: TC

## 2025-05-04 ENCOUNTER — PATIENT MESSAGE (OUTPATIENT)
Dept: INTERNAL MEDICINE | Facility: CLINIC | Age: 65
End: 2025-05-04
Payer: COMMERCIAL

## (undated) DEVICE — SUT VICRYL PLUS 4-0 P3 18IN

## (undated) DEVICE — Device

## (undated) DEVICE — GAUZE AVANT SPNG 4PLY STRL 4X4

## (undated) DEVICE — SYR 10CC LUER LOCK

## (undated) DEVICE — GUIDEWIRE DBL END TROCAR .86MM

## (undated) DEVICE — BANDAGE MATRIX HK LOOP 4IN 5YD

## (undated) DEVICE — ELECTRODE REM PLYHSV RETURN 9

## (undated) DEVICE — SUT 4-0 ETHILON 18 PS-2

## (undated) DEVICE — BLADE SAG MIC FINE 9.5X25.5MM

## (undated) DEVICE — BLADE SAGITTA 5/BX

## (undated) DEVICE — GLOVE BIOGEL PI MICRO INDIC 8

## (undated) DEVICE — DRAPE MINI C-ARM 54 X 64

## (undated) DEVICE — BLADE SAG MIC FINE 4.5X25.5MM

## (undated) DEVICE — PAD PREP CUFFED NS 24X48IN

## (undated) DEVICE — NDL HYPO REG 25G X 1 1/2

## (undated) DEVICE — PAD CAST SPECIALIST STRL 4

## (undated) DEVICE — APPLICATOR CHLORAPREP ORN 26ML

## (undated) DEVICE — SEE L#120831

## (undated) DEVICE — STRIP MEDI WND CLSR 1/2X4IN

## (undated) DEVICE — COVER OVERHEAD SURG LT BLUE

## (undated) DEVICE — SUT VICRYL 3-0 27 SH

## (undated) DEVICE — STOCKINET TUBULAR 1 PLY 6X60IN

## (undated) DEVICE — DRESSING XEROFORM NONADH 1X8IN

## (undated) DEVICE — BLADE SCALP OPHTL BEVEL STR

## (undated) DEVICE — DRESSING XEROFORM 1X8IN

## (undated) DEVICE — GLOVE BIOGEL ECLIPSE SZ 8

## (undated) DEVICE — PROFILE DRILL MICRO CMP FT

## (undated) DEVICE — BANDAGE ESMARK ELASTIC ST 4X9

## (undated) DEVICE — GAUZE SPONGE 4X4 12PLY

## (undated) DEVICE — BLADE SURG #15 CARBON STEEL

## (undated) DEVICE — BIT DRILL 2.0MM CMP FT CALIB